# Patient Record
Sex: MALE | Race: ASIAN | NOT HISPANIC OR LATINO | Employment: OTHER | URBAN - METROPOLITAN AREA
[De-identification: names, ages, dates, MRNs, and addresses within clinical notes are randomized per-mention and may not be internally consistent; named-entity substitution may affect disease eponyms.]

---

## 2017-02-02 ENCOUNTER — OFFICE VISIT (OUTPATIENT)
Dept: FAMILY MEDICINE | Facility: CLINIC | Age: 72
End: 2017-02-02
Payer: COMMERCIAL

## 2017-02-02 VITALS
HEART RATE: 76 BPM | HEIGHT: 67 IN | BODY MASS INDEX: 26.34 KG/M2 | TEMPERATURE: 96.7 F | SYSTOLIC BLOOD PRESSURE: 118 MMHG | WEIGHT: 167.8 LBS | DIASTOLIC BLOOD PRESSURE: 70 MMHG

## 2017-02-02 DIAGNOSIS — Z23 NEED FOR PROPHYLACTIC VACCINATION AGAINST STREPTOCOCCUS PNEUMONIAE (PNEUMOCOCCUS): ICD-10-CM

## 2017-02-02 DIAGNOSIS — L30.9 ECZEMA, UNSPECIFIED TYPE: Primary | ICD-10-CM

## 2017-02-02 PROCEDURE — 99213 OFFICE O/P EST LOW 20 MIN: CPT | Performed by: FAMILY MEDICINE

## 2017-02-02 RX ORDER — TRIAMCINOLONE ACETONIDE 1 MG/G
CREAM TOPICAL
Qty: 30 G | Refills: 3 | Status: SHIPPED | OUTPATIENT
Start: 2017-02-02 | End: 2017-12-12

## 2017-02-02 NOTE — PROGRESS NOTES
SUBJECTIVE:                                                    Roselia العراقي is a 72 year old male who presents to clinic today for the following health issues:      Rash     Onset: 2 weeks    Description:   Location: legs and back  Character: round, red  Itching (Pruritis): no     Progression of Symptoms:  same    Accompanying Signs & Symptoms:  Fever: no   Body aches or joint pain: no   Sore throat symptoms: no   Recent cold symptoms: no    History:   Previous similar rash: YES    Precipitating factors:   Exposure to similar rash: no   New exposures: None   Recent travel: no     Alleviating factors:  none     Therapies Tried and outcome: none      Problem list and histories reviewed & adjusted, as indicated.  Additional history: as documented    Patient Active Problem List   Diagnosis     Smoking     Dyspepsia     Varicose veins     Hyperlipidemia LDL goal <130     H. pylori infection     Vertigo, benign positional     Essential hypertension     Hyperlipidemia with target LDL less than 130     Health Care Home     Advanced directives, counseling/discussion     Left knee pain     Benign neoplasm of colon     No past surgical history on file.    Social History   Substance Use Topics     Smoking status: Former Smoker -- 0.30 packs/day for 20 years     Types: Cigarettes     Smokeless tobacco: Never Used     Alcohol Use: No     Family History   Problem Relation Age of Onset     Hypertension Father      Hypertension Brother      CEREBROVASCULAR DISEASE Father          Current Outpatient Prescriptions   Medication Sig Dispense Refill     triamcinolone (KENALOG) 0.1 % cream Apply sparingly to affected area two times daily for 14 days. 30 g 3     propranolol (INDERAL) 10 MG tablet Take 1 tablet (10 mg) by mouth 2 times daily 180 tablet 3     losartan (COZAAR) 50 MG tablet Take 1 tablet (50 mg) by mouth daily 90 tablet 0     UNABLE TO FIND MEDICATION NAME: PreserVision AREDS 2 formula soft gel. 1 soft gel twice daily        "simvastatin (ZOCOR) 20 MG tablet Take 1 tablet (20 mg) by mouth At Bedtime 90 tablet 3     calcium carbonate 500 MG tablet Take 1 tablet by mouth daily. 100 tablet 3     fish oil-omega-3 fatty acids 1000 MG capsule Take 2 capsules by mouth daily. 180 capsule 12     multivitamin, therapeutic with minerals (MULTI-VITAMIN) TABS Take 1 tablet by mouth daily       Allergies   Allergen Reactions     Penicillins      Sulfa Drugs Hives     Sulphasomidine      Tetracycline      Recent Labs   Lab Test  12/20/16   1011  12/15/15   1109  10/01/15   1104  06/25/15   1226   12/18/14   1133   10/03/13   1110   08/09/12   1446   LDL  59  48   --   69   --   65   < >   --    < >   --    HDL  36*  40   --    --    --   41   < >   --    < >   --    TRIG  239*  252*   --    --    --   178*   < >   --    < >   --    ALT  21  37  35  39   --   31   < >  74*   < >  66   CR   --   0.90   --   0.84   < >  0.88   < >  1.05   < >  0.99   GFRESTIMATED   --   83   --   90   < >  86   < >  70   < >  75   GFRESTBLACK   --   >90   GFR Calc     --   >90   GFR Calc     < >  >90   GFR Calc     < >  85   < >  >90   POTASSIUM   --   4.3   --   4.3   < >  4.4   < >  3.6   < >  4.3   TSH   --    --    --    --    --    --    --   2.04   --   1.82    < > = values in this interval not displayed.      BP Readings from Last 3 Encounters:   02/02/17 118/70   12/20/16 126/84   11/01/16 124/92    Wt Readings from Last 3 Encounters:   02/02/17 167 lb 12.8 oz (76.114 kg)   12/20/16 166 lb (75.297 kg)   11/01/16 164 lb (74.39 kg)                    ROS:  Constitutional, HEENT, cardiovascular, pulmonary, gi and gu systems are negative, except as otherwise noted.    OBJECTIVE:                                                    /70 mmHg  Pulse 76  Temp(Src) 96.7  F (35.9  C) (Tympanic)  Ht 5' 7\" (1.702 m)  Wt 167 lb 12.8 oz (76.114 kg)  BMI 26.28 kg/m2  Body mass index is 26.28 kg/(m^2).  GENERAL: healthy, " alert and no distress  NECK: no adenopathy, no asymmetry, masses, or scars and thyroid normal to palpation  RESP: lungs clear to auscultation - no rales, rhonchi or wheezes  CV: regular rate and rhythm, normal S1 S2, no S3 or S4, no murmur, click or rub, no peripheral edema and peripheral pulses strong  ABDOMEN: soft, nontender, no hepatosplenomegaly, no masses and bowel sounds normal  MS: no gross musculoskeletal defects noted, no edema         ASSESSMENT/PLAN:                                                    Roselia was seen today for derm problem.    Diagnoses and all orders for this visit:    Eczema, unspecified type  -     triamcinolone (KENALOG) 0.1 % cream; Apply sparingly to affected area two times daily for 14 days.    Need for prophylactic vaccination against Streptococcus pneumoniae (pneumococcus)      Encouraged moisturizer bid,         David Turcios MD  [facility]

## 2017-02-02 NOTE — NURSING NOTE
"Chief Complaint   Patient presents with     Derm Problem       Initial /70 mmHg  Pulse 76  Temp(Src) 96.7  F (35.9  C) (Tympanic)  Ht 5' 7\" (1.702 m)  Wt 167 lb 12.8 oz (76.114 kg)  BMI 26.28 kg/m2 Estimated body mass index is 26.28 kg/(m^2) as calculated from the following:    Height as of this encounter: 5' 7\" (1.702 m).    Weight as of this encounter: 167 lb 12.8 oz (76.114 kg).  BP completed using cuff size: pancho GARCIA MA Student   "

## 2017-02-02 NOTE — MR AVS SNAPSHOT
"              After Visit Summary   2/2/2017    Roselia العراقي    MRN: [id number]           Patient Information     Date Of Birth          1945        Visit Information        Provider Department      2/2/2017 9:45 AM David Turcios MD; [facility] [employer] [facility]        Today's Diagnoses     Need for prophylactic vaccination against Streptococcus pneumoniae (pneumococcus)    -  1     Eczema, unspecified type            Follow-ups after your visit        Who to contact     If you have questions or need follow up information about today's clinic visit or your schedule please contact [facility] directly at [phone].  Normal or non-critical lab and imaging results will be communicated to you by MyChart, letter or phone within 4 business days after the clinic has received the results. If you do not hear from us within 7 days, please contact the clinic through [employer] or phone. If you have a critical or abnormal lab result, we will notify you by phone as soon as possible.  Submit refill requests through [employer] or call your pharmacy and they will forward the refill request to us. Please allow 3 business days for your refill to be completed.          Additional Information About Your Visit        MyChart Information     [employer] gives you secure access to your electronic health record. If you see a primary care provider, you can also send messages to your care team and make appointments. If you have questions, please call your primary care clinic.  If you do not have a primary care provider, please call [phone] and they will assist you.        Care EveryWhere ID     This is your Care EveryWhere ID. This could be used by other organizations to access your [facility] medical records  [id number]        Your Vitals Were     Pulse Temperature Height BMI (Body Mass Index)          76 96.7  F (35.9  C) (Tympanic) 5' 7\" (1.702 m) 26.28 kg/m2         Blood Pressure from " Last 3 Encounters:   02/02/17 118/70   12/20/16 126/84   11/01/16 124/92    Weight from Last 3 Encounters:   02/02/17 167 lb 12.8 oz (76.114 kg)   12/20/16 166 lb (75.297 kg)   11/01/16 164 lb (74.39 kg)              Today, you had the following     No orders found for display         Today's Medication Changes          These changes are accurate as of: 2/2/17 10:18 AM.  If you have any questions, ask your nurse or doctor.               Start taking these medicines.        Dose/Directions    triamcinolone 0.1 % cream   Commonly known as:  KENALOG   Used for:  Eczema, unspecified type   Started by:  David Turcios MD        Apply sparingly to affected area two times daily for 14 days.   Quantity:  30 g   Refills:  3            Where to get your medicines      These medications were sent to [place] [address] [facility] [address] [facility]  [address] [facility] [address]     Phone:  [phone]    - triamcinolone 0.1 % cream             Primary Care Provider Office Phone # Fax #    David Turcios [phone]       [address] [address]        Thank you!     Thank you for choosing [facility]  for your care. Our goal is always to provide you with excellent care. Hearing back from our patients is one way we can continue to improve our services. Please take a few minutes to complete the written survey that you may receive in the mail after your visit with us. Thank you!             Your Updated Medication List - Protect others around you: Learn how to safely use, store and throw away your medicines at www.disposemymeds.org.          This list is accurate as of: 2/2/17 10:18 AM.  Always use your most recent med list.                   Brand Name Dispense Instructions for use    calcium carbonate 500 MG tablet    OS-[place] 500 mg [place]. Ca    100 tablet    Take 1 tablet by mouth daily.       fish oil-omega-3 fatty  acids 1000 MG capsule     180 capsule    Take 2 capsules by mouth daily.       losartan 50 MG tablet    COZAAR    90 tablet    Take 1 tablet (50 mg) by mouth daily       Multi-vitamin Tabs tablet      Take 1 tablet by mouth daily       propranolol 10 MG tablet    INDERAL    180 tablet    Take 1 tablet (10 mg) by mouth 2 times daily       simvastatin 20 MG tablet    ZOCOR    90 tablet    Take 1 tablet (20 mg) by mouth At Bedtime       triamcinolone 0.1 % cream    KENALOG    30 g    Apply sparingly to affected area two times daily for 14 days.       UNABLE TO FIND      MEDICATION NAME: PreserVision AREDS 2 formula soft gel. 1 soft gel twice daily

## 2017-03-02 ENCOUNTER — EXTERNAL ORDER RESULTS (OUTPATIENT)
Dept: FAMILY MEDICINE | Facility: CLINIC | Age: 72
End: 2017-03-02

## 2017-03-02 DIAGNOSIS — Z53.9 DIAGNOSIS NOT YET DEFINED: Primary | ICD-10-CM

## 2017-03-06 DIAGNOSIS — E78.5 HYPERLIPIDEMIA WITH TARGET LDL LESS THAN 130: Primary | ICD-10-CM

## 2017-03-06 DIAGNOSIS — I10 ESSENTIAL HYPERTENSION: ICD-10-CM

## 2017-03-06 NOTE — TELEPHONE ENCOUNTER
Reason for call: Medication   If this is a refill request, has the caller requested the refill from the pharmacy already? Yes  Will the patient be using a [facility]? No  Name of the pharmacy and phone number for the current request: [facility] [phone]    Name of the medication requested: Simvastatin, 20mg and Losartan, 50mg    Other request: N/A    Phone Number Pt can be reached at: Home number on file [phone] (home)  Best Time: anytime  Can we leave a detailed message on this number? YES

## 2017-03-07 NOTE — TELEPHONE ENCOUNTER
Simvastatin     Last Written Prescription Date: 6/25/15  Last Fill Quantity: 90, # refills: 3  Last Office Visit with [facility], [facility] or  Health prescribing provider: 2/2/17       Lab Results   Component Value Date    CHOL 143 12/20/2016     Lab Results   Component Value Date    HDL 36 12/20/2016     Lab Results   Component Value Date    LDL 59 12/20/2016    LDL 69 06/25/2015     Lab Results   Component Value Date    TRIG 239 12/20/2016     Lab Results   Component Value Date    CHOLHDLRATIO 3.5 12/18/2014     Losartan      Last Written Prescription Date: 11/16/16  Last Fill Quantity: 90, # refills: 0  Last Office Visit with [facility], [facility] or [facility] prescribing provider: 2/2/17       Potassium   Date Value Ref Range Status   12/15/2015 4.3 3.4 - 5.3 mmol/L Final     Creatinine   Date Value Ref Range Status   12/15/2015 0.90 0.66 - 1.25 mg/dL Final     BP Readings from Last 3 Encounters:   02/02/17 118/70   12/20/16 126/84   11/01/16 (!) 124/92     Amanda Dobbins [facility] [address]

## 2017-03-08 RX ORDER — LOSARTAN POTASSIUM 50 MG/1
50 TABLET ORAL DAILY
Qty: 90 TABLET | Refills: 0 | Status: SHIPPED | OUTPATIENT
Start: 2017-03-08 | End: 2017-07-11

## 2017-03-08 RX ORDER — SIMVASTATIN 20 MG
20 TABLET ORAL AT BEDTIME
Qty: 90 TABLET | Refills: 2 | Status: SHIPPED | OUTPATIENT
Start: 2017-03-08 | End: 2017-12-12

## 2017-03-08 NOTE — TELEPHONE ENCOUNTER
Routing refill request to provider for review/approval because:  Labs not current:  For losartan    Brianna EnglandRN  [facility]  [phone]

## 2017-03-16 PROCEDURE — G0179 MD RECERTIFICATION HHA PT: HCPCS | Performed by: FAMILY MEDICINE

## 2017-03-22 ENCOUNTER — CARE COORDINATION (OUTPATIENT)
Dept: GERIATRIC MEDICINE | Facility: CLINIC | Age: 72
End: 2017-03-22

## 2017-03-22 NOTE — PROGRESS NOTES
Received a call from daughter Emilee stating that Betty from [facility] called her about adding homemaking hours. Explained that client was already getting homemaking hours already. Emilee states she is confused and wanted this CM to contact Betty [phone] ext [phone]. Called Gordon and she said that Emilee has been filling out the PCA portion of the time sheet correctly but has not been filling out the homemaking portion and wanted Emilee to start doing this. Called Emilee back and explained this and she was wondering if she needed to fill out homemaking daily or weekly. Conference call with Emilee placed to Gordon and no answer. ML with this question and to call Emilee with the answer. To call this CM if has further questions.   Chanda Pollack RN, PHN, [facility]   [phone]

## 2017-04-13 ENCOUNTER — CARE COORDINATION (OUTPATIENT)
Dept: GERIATRIC MEDICINE | Facility: CLINIC | Age: 72
End: 2017-04-13

## 2017-04-13 NOTE — PROGRESS NOTES
Client and spouse due for PCA assessment by end of month. They go to [facility] on MWF and are flexible with changing the days, except for W/F's. Called daughter Emilee and she was with her father. Discussed seeing client by end of the month and Emilee reports she will be on vacation the last week of April so next week is best. Made an appt to see client and spouse for Monday 4/17/17.Called Jenni Owens for a Mandarin .  Chanda Pollack RN, PHN, [facility]   [phone]

## 2017-04-17 ENCOUNTER — CARE COORDINATION (OUTPATIENT)
Dept: GERIATRIC MEDICINE | Facility: CLINIC | Age: 72
End: 2017-04-17

## 2017-04-17 DIAGNOSIS — Z76.89 HEALTH CARE HOME: ICD-10-CM

## 2017-04-17 DIAGNOSIS — Z71.89 ADVANCED DIRECTIVES, COUNSELING/DISCUSSION: ICD-10-CM

## 2017-04-17 NOTE — PROGRESS NOTES
Home visit/Obed Risk Assessment/EW screening and PCA assessment completed today with client, spouse Vj Brown, daughter Emilee,  Júnior Rader from [employer], co-worker Margaux Curtis, and this CM.  Member resides: Subsidized apt with elevator. Client lives with wife and has a daughter Emilee, that lives in nearby suburb. The spend a lot of time at daughters home when not at [facility]. Client and spouse go to [facility] and then spend Tu/Th and weekends.   Member currently receiving the following services: Adult Day Care from [facility] 3 days a week and PCA at 2.15 hrs daily, and homemaking 3 hrs a week, from [employer]. Daughter is PCA/homemaker.  See EMR for a list of client's diagnoses and medications.  Medication management: Medications reviewed: Med reconciliation done. Medication management: Daughter sets up medication in pill minder. Medication understanding: Patient has understanding of regimen and is adherent Yes. MTM eligible.  Falls: Client with poor vision-macular degeneration and cannot see well. Due to poor vision, client reports fell recently when he could not see the stairs. No major injuries. Client uses a magnifying glass to read and to sign things. Discussed [facility] and called intake and left a message today.  ADL's/IADL's:  Client able to answer the phone, when he hears it, and make calls. Daughter makes medical appts for client and arranges ride. Client reports his wife and daughter do most of the shopping but has gone across the street to [employer] as needed, with wife. Client can make a sandwich or simple meal but due to poor eyesight, makes a mess. Client can do light cleaning like the dishes but gets water everywhere. Daughter does the heavy cleaning and the laundry at daughter's home. Daughter also assists with finances and MA papers. PCA assists client with dressing-with buttons and tightening shoe laces due to poor eyesight. Client can brush own teeth,  "wash face, and reports does not comb hair-just runs his hand through his hair. Client also shaves himself, but due to poor vision, misses part of his facial hair so PCA finishes this task. Client reports he is unable to wash his feet. Discussed client has a bath bench and if he uses it, he could sit down and reach feet. Client reports he feeds himself independently but due to poor vision, needs assist with cutting food. Observed client to transfer a few times during our visit, walk independently in apt in steady gait, as well as reposition self. Client able to get out of bed on own. Client's wife holds onto client when walking in unfamiliar territory, hoa when there are uneven surfaces due to poor vision and falls. Client states he is continent of urine and toilets self independently.   Member Mood/behavior-PHQ 2 score: Client scored 0. Previous CM reports history of feelings of rage and that client may feel like he may want to hit things, but has not acted on these feelings. Hx also of suicidal thoughts and anxiety as well as loneliness and not wanting to bother others. Asked client about this history and how he was feeling now, as well as if he wanted counseling. Client reports when his wife gets angry with him for making a mess, wife will yell at him continually until he gets so upset that all he can see is \"black\". Client will then tell wife to stop because he is seeing black and then wife will stop yelling. Client will then take some time to calm down. Client states going to [facility] has helped a lot with the previous feelings of rage and anger,and that he does not feel the need for counseling by going to [facility].   [facility] benefits: Shared information re: Silver Sneakers/gym memberships, ASA, Calcium +D.  Plan of Care Is: MA transportation as needed, PCA services, ADC, [facility], and case management.   Reviewed Community wide emergency planning: What to do in fire and weather " related emergencies.   Release of Information: signed by client to talk with wife and daughter. Also discussed ACP and short form given to client.   Follow-Up Plan: Member informed of future contact, plan to f/u with member with a 6 month telephone assessment.  Contact information shared with member and family, encouraged member to call with any questions or concerns prior to this.  See [facility] for further detailed information.  Chanda Pollack RN, PHN, CCM  [facility]   [phone]

## 2017-04-18 ENCOUNTER — CARE COORDINATION (OUTPATIENT)
Dept: GERIATRIC MEDICINE | Facility: CLINIC | Age: 72
End: 2017-04-18

## 2017-04-18 NOTE — PROGRESS NOTES
Received a message from Shraddha from [facility]) at [phone] that she was returning this CM call. Called Shraddha back and no answer. ML again.  Chanda Pollack RN, PHN, [facility]   [phone]    4/19/17  Spoke with Wilda who took referral information. They will call daughter to set up the appt. Discussed client wanting a cane and they will evaluate for this as well. Daughter informed that R will be contacting her to set up an appt for a free in home assessment.  Chanda Pollack RN, PHN, [facility]   [phone]    4/21/17  MMIS completed and entered. Client remains a rate cell B. PCA assessment shows client remains eligible for same number of PCA hours daily. Called PRN and informed them of the PCA results and PCA hours remain the same and effective from 5/1/17-4/30/18. POC updated in epic and given to CMS for processing as well as the PCA assessment.   Chanda Pollack RN, PHN, [facility]   [phone]

## 2017-04-21 ENCOUNTER — OFFICE VISIT (OUTPATIENT)
Dept: URGENT CARE | Facility: URGENT CARE | Age: 72
End: 2017-04-21
Payer: COMMERCIAL

## 2017-04-21 ENCOUNTER — CARE COORDINATION (OUTPATIENT)
Dept: GERIATRIC MEDICINE | Facility: CLINIC | Age: 72
End: 2017-04-21

## 2017-04-21 VITALS
SYSTOLIC BLOOD PRESSURE: 120 MMHG | TEMPERATURE: 98.3 F | OXYGEN SATURATION: 93 % | WEIGHT: 168.1 LBS | DIASTOLIC BLOOD PRESSURE: 70 MMHG | HEART RATE: 70 BPM | BODY MASS INDEX: 26.33 KG/M2

## 2017-04-21 DIAGNOSIS — K12.0 ORAL APHTHAE: ICD-10-CM

## 2017-04-21 DIAGNOSIS — K13.79 MOUTH SORE: Primary | ICD-10-CM

## 2017-04-21 PROCEDURE — 99213 OFFICE O/P EST LOW 20 MIN: CPT | Performed by: PHYSICIAN ASSISTANT

## 2017-04-21 RX ORDER — TRIAMCINOLONE ACETONIDE 0.1 %
PASTE (GRAM) DENTAL 2 TIMES DAILY
Qty: 5 G | Refills: 0 | Status: SHIPPED | OUTPATIENT
Start: 2017-04-21 | End: 2019-09-24

## 2017-04-21 NOTE — PROGRESS NOTES
Mailed copy of care plan to client.  As requested/needed:  emailed CPS to Giuliana for auths, updated services in access as needed and submitted appropriate referrals/auths, faxed PCA assessment to MD and [facility] and mailed to patient.  Chart was returned to YASMINE.     Gail Ariza  Case Management Specialist  [facility]  [phone]

## 2017-04-21 NOTE — MR AVS SNAPSHOT
After Visit Summary   4/21/2017    Roselia العراقي    MRN: [id number]           Patient Information     Date Of Birth          1945        Visit Information        Provider Department      4/21/2017 3:45 PM Brayden Perez PA-C [facility]        Today's Diagnoses     Mouth sore    -  1    Oral aphthae          Care Instructions      Canker Sore    A canker sore (also called an aphthous ulcer) is a painful sore on the lining of the mouth. It is most painful during the first few days, and it lasts about 7 to 14 days before going away.  Causes   Canker sores are not cold sores or fever blisters. They are not contagious, so they are not spread by contact. The exact cause of canker sores is not clear, but there are a number of things that can trigger them in different people.    Mild injury, such as biting the inside of the mouth, lip, or cheek, or dental procedures    Stress    Poor diet, or lack of certain nutrients, including B vitamins and iron    Foods that can irritate the mouth, including tomatoes, citrus fruits, and some nuts (foods that are acidic or contain bitter substances called tannins)    Irritating chemicals, such as those in some toothpastes and mouthwashes    Certain chronic illnesses  Symptoms   Canker sores are found on the lining of the mouth. They can be inside the cheeks or lips, on the roof of the mouth, at the base of the gums, on the tongue, or in the back of the throat. Canker sores typically have these characteristics:    Small, flat (not raised) sores    Can be white or yellowish bumps that are red around the edges or have a red halo    Usually small in size, roundish, and in groups    Accompanied by pain or burning   Canker sores do not leave a scar. But they usually come back.  Home care  The goals of canker sore treatment are to decrease the pain, speed healing, and prevent recurrence. No single treatment works for everyone. Try a number of  techniques to see what works best.  General care    You may find that soft, easy-to-chew foods cause less pain. Use a straw to direct liquids away from the sore.    Use a soft-bristle toothbrush, and brush your teeth gently.    Avoid acidic, salty, or spicy foods.    Avoid injuring the inside of your mouth, or scraping your existing canker sores, by avoiding crusty and crunchy foods like french bread and chips.  Medicines  You can try over-the-counter medicines that cover the sores and numb them. This protects the sores while they heal and helps reduce pain.  Homemade rinses and solutions  You can use these solutions as mouth rinses. Spit them out after using them. You can also dab them on the sores. You can repeat these treatments as often as needed.    Rinse your mouth with saltwater.    Mix equal amounts of hydrogen peroxide and water. You can use it as a mouthwash or dab it on spots with a cotton swab. You can also add sodium bicarbonate to this to make a paste, and then dab it on spots.  Follow-up care  Follow up with your healthcare provider, or as advised.    If a culture was done, you will be notified if the treatment needs to be changed. You can call as directed for the results.  Call 911  Contact emergency services if any of these occur:    Trouble breathing    Inability to swallow    Extreme drowsiness or trouble awakening    Fainting or loss of consciousness    Rapid heart rate    Seizure    Stiff neck  When to seek medical advice  Call your healthcare provider right away if any of these occur:    You have a fever of 100.4 F (38 C) or higher.    You are pregnant.    You just had surgery or another medical procedure, or you were just discharged from the hospital.    You are unable to eat or swallow due to pain.    3747-6843 [employer]. [address] [place] [address], [place] [address]. All rights reserved. This information is not intended as a substitute for professional medical care. Always follow  your healthcare professional's instructions.              Follow-ups after your visit        Who to contact     If you have questions or need follow up information about today's clinic visit or your schedule please contact [facility] directly at [phone].  Normal or non-critical lab and imaging results will be communicated to you by MyChart, letter or phone within 4 business days after the clinic has received the results. If you do not hear from us within 7 days, please contact the clinic through [employer] or phone. If you have a critical or abnormal lab result, we will notify you by phone as soon as possible.  Submit refill requests through [employer] or call your pharmacy and they will forward the refill request to us. Please allow 3 business days for your refill to be completed.          Additional Information About Your Visit        MyChart Information     [facility] gives you secure access to your electronic health record. If you see a primary care provider, you can also send messages to your care team and make appointments. If you have questions, please call your primary care clinic.  If you do not have a primary care provider, please call [phone] and they will assist you.        Care EveryWhere ID     This is your Care EveryWhere ID. This could be used by other organizations to access your [facility] medical records  [id number]        Your Vitals Were     Pulse Temperature Pulse Oximetry BMI (Body Mass Index)          70 98.3  F (36.8  C) (Oral) 93% 26.33 kg/m2         Blood Pressure from Last 3 Encounters:   04/21/17 120/70   02/02/17 118/70   12/20/16 126/84    Weight from Last 3 Encounters:   04/21/17 168 lb 1.6 oz (76.2 kg)   02/02/17 167 lb 12.8 oz (76.1 kg)   12/20/16 166 lb (75.3 kg)              Today, you had the following     No orders found for display         Today's Medication Changes          These changes are accurate as of: 4/21/17 11:59 PM.  If you have any  questions, ask your nurse or doctor.               Start taking these medicines.        Dose/Directions    MAGIC MOUTHWASH (ENTER INGREDIENTS IN COMMENTS)   Used for:  Mouth sore, Oral aphthae   Started by:  Brayden Perez PA-C        Dose:  5-10 mL   Swish and spit 5-10 mLs in mouth every 6 hours as needed Pharmacy please compound  30 ml of Benadryl (12.5 mg/5 ml), 60 ml Maalox and 30 ml Viscous Lidocaine   Quantity:  120 mL   Refills:  0       triamcinolone 0.1 % paste   Commonly known as:  KENALOG   Used for:  Mouth sore, Oral aphthae   Started by:  Brayden Perez PA-C        Take by mouth 2 times daily   Quantity:  5 g   Refills:  0            Where to get your medicines      Some of these will need a paper prescription and others can be bought over the counter.  Ask your nurse if you have questions.     Bring a paper prescription for each of these medications     MAGIC MOUTHWASH (ENTER INGREDIENTS IN COMMENTS)    triamcinolone 0.1 % paste                Primary Care Provider Office Phone # Fax #    David Turcios [phone]       [address] [address]        Thank you!     Thank you for choosing [facility]  for your care. Our goal is always to provide you with excellent care. Hearing back from our patients is one way we can continue to improve our services. Please take a few minutes to complete the written survey that you may receive in the mail after your visit with us. Thank you!             Your Updated Medication List - Protect others around you: Learn how to safely use, store and throw away your medicines at www.disposemymeds.org.          This list is accurate as of: 4/21/17 11:59 PM.  Always use your most recent med list.                   Brand Name Dispense Instructions for use    calcium carbonate 500 MG tablet    OS-ALEAXNDRIA 500 mg [place]. Ca    100 tablet    Take 1 tablet by mouth daily Client taking it twice a  day       fish oil-omega-3 fatty acids 1000 MG capsule     180 capsule    Take 2 capsules by mouth daily.       losartan 50 MG tablet    COZAAR    90 tablet    Take 1 tablet (50 mg) by mouth daily       MAGIC MOUTHWASH (ENTER INGREDIENTS IN COMMENTS)     120 mL    Swish and spit 5-10 mLs in mouth every 6 hours as needed Pharmacy please compound  30 ml of Benadryl (12.5 mg/5 ml), 60 ml Maalox and 30 ml Viscous Lidocaine       Multi-vitamin Tabs tablet      Take 1 tablet by mouth daily       propranolol 10 MG tablet    INDERAL    180 tablet    Take 1 tablet (10 mg) by mouth 2 times daily       simvastatin 20 MG tablet    ZOCOR    90 tablet    Take 1 tablet (20 mg) by mouth At Bedtime       triamcinolone 0.1 % cream    KENALOG    30 g    Apply sparingly to affected area two times daily for 14 days.       triamcinolone 0.1 % paste    KENALOG    5 g    Take by mouth 2 times daily       UNABLE TO FIND      MEDICATION NAME: PreserVision AREDS 2 formula soft gel. 1 soft gel twice daily

## 2017-04-21 NOTE — PATIENT INSTRUCTIONS
Canker Sore    A canker sore (also called an aphthous ulcer) is a painful sore on the lining of the mouth. It is most painful during the first few days, and it lasts about 7 to 14 days before going away.  Causes   Canker sores are not cold sores or fever blisters. They are not contagious, so they are not spread by contact. The exact cause of canker sores is not clear, but there are a number of things that can trigger them in different people.    Mild injury, such as biting the inside of the mouth, lip, or cheek, or dental procedures    Stress    Poor diet, or lack of certain nutrients, including B vitamins and iron    Foods that can irritate the mouth, including tomatoes, citrus fruits, and some nuts (foods that are acidic or contain bitter substances called tannins)    Irritating chemicals, such as those in some toothpastes and mouthwashes    Certain chronic illnesses  Symptoms   Canker sores are found on the lining of the mouth. They can be inside the cheeks or lips, on the roof of the mouth, at the base of the gums, on the tongue, or in the back of the throat. Canker sores typically have these characteristics:    Small, flat (not raised) sores    Can be white or yellowish bumps that are red around the edges or have a red halo    Usually small in size, roundish, and in groups    Accompanied by pain or burning   Canker sores do not leave a scar. But they usually come back.  Home care  The goals of canker sore treatment are to decrease the pain, speed healing, and prevent recurrence. No single treatment works for everyone. Try a number of techniques to see what works best.  General care    You may find that soft, easy-to-chew foods cause less pain. Use a straw to direct liquids away from the sore.    Use a soft-bristle toothbrush, and brush your teeth gently.    Avoid acidic, salty, or spicy foods.    Avoid injuring the inside of your mouth, or scraping your existing canker sores, by avoiding crusty and crunchy  foods like french bread and chips.  Medicines  You can try over-the-counter medicines that cover the sores and numb them. This protects the sores while they heal and helps reduce pain.  Homemade rinses and solutions  You can use these solutions as mouth rinses. Spit them out after using them. You can also dab them on the sores. You can repeat these treatments as often as needed.    Rinse your mouth with saltwater.    Mix equal amounts of hydrogen peroxide and water. You can use it as a mouthwash or dab it on spots with a cotton swab. You can also add sodium bicarbonate to this to make a paste, and then dab it on spots.  Follow-up care  Follow up with your healthcare provider, or as advised.    If a culture was done, you will be notified if the treatment needs to be changed. You can call as directed for the results.  Call 911  Contact emergency services if any of these occur:    Trouble breathing    Inability to swallow    Extreme drowsiness or trouble awakening    Fainting or loss of consciousness    Rapid heart rate    Seizure    Stiff neck  When to seek medical advice  Call your healthcare provider right away if any of these occur:    You have a fever of 100.4 F (38 C) or higher.    You are pregnant.    You just had surgery or another medical procedure, or you were just discharged from the hospital.    You are unable to eat or swallow due to pain.    3787-1492 [employer]. [address] [place] [address], [place] [address]. All rights reserved. This information is not intended as a substitute for professional medical care. Always follow your healthcare professional's instructions.

## 2017-04-21 NOTE — NURSING NOTE
"Chief Complaint   Patient presents with     Mouth Lesions     sore mouth, pain, crancrek boty on cheek and toungue 3x days        Initial /70 (BP Location: Left arm, Patient Position: Chair, Cuff Size: Adult Regular)  Pulse 70  Temp 98.3  F (36.8  C) (Oral)  Wt 168 lb 1.6 oz (76.2 kg)  SpO2 93%  BMI 26.33 kg/m2 Estimated body mass index is 26.33 kg/(m^2) as calculated from the following:    Height as of 2/2/17: 5' 7\" (1.702 m).    Weight as of this encounter: 168 lb 1.6 oz (76.2 kg).  Medication Reconciliation: complete    "

## 2017-04-24 NOTE — PROGRESS NOTES
SUBJECTIVE:   Roselia العراقي is a 72 year old male presenting with a chief complaint of canker sores and sore on tongue.  Onset of symptoms was few day(s) ago.  Course of illness is worsening.    Severity moderate  Current and Associated symptoms: sore throat, and sore on cheek  Treatment measures tried include none.  Predisposing factors include none.    Past Medical History:   Diagnosis Date     Gastric ulcer, unspecified as acute or chronic, without mention of hemorrhage or perforation      Gastro-oesophageal reflux disease      High cholesterol      Hyperlipaemia      Hypertension      Smoking      Upper GI bleeding      Vertigo, benign positional         Allergies   Allergen Reactions     Penicillins      Sulfa Drugs Hives     Sulphasomidine      Tetracycline          Social History   Substance Use Topics     Smoking status: Former Smoker     Packs/day: 0.30     Years: 20.00     Types: Cigarettes     Smokeless tobacco: Never Used     Alcohol use No       ROS:  CONSTITUTIONAL:NEGATIVE for fever, chills, change in weight  INTEGUMENTARY/SKIN: POSITIVE for sore on tongue and cheek  ENT/MOUTH: Positive for tongue and cheek soresness  RESP:NEGATIVE for significant cough or SOB  CV: NEGATIVE for chest pain, palpitations or peripheral edema  MUSCULOSKELETAL: NEGATIVE for significant arthralgias or myalgia  NEURO: NEGATIVE for weakness, dizziness or paresthesias    OBJECTIVE  :/70 (BP Location: Left arm, Patient Position: Chair, Cuff Size: Adult Regular)  Pulse 70  Temp 98.3  F (36.8  C) (Oral)  Wt 168 lb 1.6 oz (76.2 kg)  SpO2 93%  BMI 26.33 kg/m2  GENERAL APPEARANCE: healthy, alert and no distress  HENT: TM's normal bilaterally  NECK: supple, nontender, no lymphadenopathy  RESP: lungs clear to auscultation - no rales, rhonchi or wheezes  NEURO: Normal strength and tone, sensory exam grossly normal,  normal speech and mentation  SKIN: Positive for soreness on tongue and cheek    ASSESSMENT/PLN:      ICD-10-CM     1. Mouth sore K13.79 MAGIC MOUTHWASH, ENTER INGREDIENTS IN COMMENTS,     triamcinolone (KENALOG) 0.1 % paste   2. Oral aphthae K12.0 MAGIC MOUTHWASH, ENTER INGREDIENTS IN COMMENTS,     triamcinolone (KENALOG) 0.1 % paste       Follow up with PCP as needed  See orders in Epic

## 2017-05-16 ENCOUNTER — CARE COORDINATION (OUTPATIENT)
Dept: GERIATRIC MEDICINE | Facility: CLINIC | Age: 72
End: 2017-05-16

## 2017-05-16 NOTE — PROGRESS NOTES
Received a call from Nereida Ness from [facility] at [phone] stating that she saw client today. We discussed client with diagnoses of ARMD as well as cataracts. One of the last eye exams showed client with 20/125 and 20/200 vision and Nereida states that is about where she tested him today. No recent eye exam in [facility] from [facility] so called them to get ICD 10 codes as Nereida wants to apply for [employer] for client. ICD 10 codes of H35,3112, H35.3124, H35.3131 and H25.813. Nereida said she was able to give him a 20/20 pen where it writes thicker and bolder without bleeding as well as a signature guide and lighted magnifying glass. She also put client on the waiting list for a CC TV-closed captioning TV. Client was also interested in a cane as he has a hard time with stairs and curbs and tends to trip or fall in these areas.  Nereida said she can get client a white cane and there is a waiting list for white cane training but she will also refer client to [facility] services for the blind to see if they could help out with the training and any other thing client may need.

## 2017-06-13 ENCOUNTER — CARE COORDINATION (OUTPATIENT)
Dept: GERIATRIC MEDICINE | Facility: CLINIC | Age: 72
End: 2017-06-13

## 2017-06-13 NOTE — PROGRESS NOTES
Spoke with daughter Aditi who states client wanted to thank this CM for the referral to [facility] as client was able to get a pair of sunglasses and also a lighter magnifying glass to help with reading. They were not able to get a cane as client needs assist with using stairs and curbs as tends to fall in these areas. Request sent to CMS to please order a cane. Note also sent to PCP.  Chanda Pollack RN, PHN, [facility]   [phone]

## 2017-07-10 NOTE — PROGRESS NOTES
Per [facility], cane was delivered to client on 6/21/17. Checked with daughter Emilee and she states they were on vacation when the cane was delivered and did not think her dad got the cane. She will double check and let this CM know if he got the cane.  Chanda Pollack RN, PHN, [facility]   [phone]

## 2017-07-11 ENCOUNTER — OFFICE VISIT (OUTPATIENT)
Dept: FAMILY MEDICINE | Facility: CLINIC | Age: 72
End: 2017-07-11
Payer: COMMERCIAL

## 2017-07-11 VITALS
DIASTOLIC BLOOD PRESSURE: 83 MMHG | RESPIRATION RATE: 14 BRPM | BODY MASS INDEX: 26.21 KG/M2 | HEIGHT: 67 IN | TEMPERATURE: 98.3 F | OXYGEN SATURATION: 96 % | SYSTOLIC BLOOD PRESSURE: 134 MMHG | WEIGHT: 167 LBS | HEART RATE: 71 BPM

## 2017-07-11 DIAGNOSIS — I10 ESSENTIAL HYPERTENSION: ICD-10-CM

## 2017-07-11 DIAGNOSIS — R20.0 FINGER NUMBNESS: ICD-10-CM

## 2017-07-11 DIAGNOSIS — H54.7 POOR VISION: ICD-10-CM

## 2017-07-11 DIAGNOSIS — H61.20 WAX IN EAR: ICD-10-CM

## 2017-07-11 DIAGNOSIS — Z23 NEED FOR PROPHYLACTIC VACCINATION AGAINST STREPTOCOCCUS PNEUMONIAE (PNEUMOCOCCUS): Primary | ICD-10-CM

## 2017-07-11 DIAGNOSIS — H91.91 HEARING DIFFICULTY, RIGHT: ICD-10-CM

## 2017-07-11 PROCEDURE — 69210 REMOVE IMPACTED EAR WAX UNI: CPT | Performed by: FAMILY MEDICINE

## 2017-07-11 PROCEDURE — 99214 OFFICE O/P EST MOD 30 MIN: CPT | Mod: 25 | Performed by: FAMILY MEDICINE

## 2017-07-11 PROCEDURE — 36415 COLL VENOUS BLD VENIPUNCTURE: CPT | Performed by: FAMILY MEDICINE

## 2017-07-11 PROCEDURE — 80048 BASIC METABOLIC PNL TOTAL CA: CPT | Performed by: FAMILY MEDICINE

## 2017-07-11 PROCEDURE — 84460 ALANINE AMINO (ALT) (SGPT): CPT | Performed by: FAMILY MEDICINE

## 2017-07-11 RX ORDER — LOSARTAN POTASSIUM 50 MG/1
50 TABLET ORAL DAILY
Qty: 90 TABLET | Refills: 1 | Status: SHIPPED | OUTPATIENT
Start: 2017-07-11 | End: 2018-01-11

## 2017-07-11 NOTE — MR AVS SNAPSHOT
After Visit Summary   7/11/2017    Roselia العراقي    MRN: [id number]           Patient Information     Date Of Birth          1945        Visit Information        Provider Department      7/11/2017 9:30 AM David Turcios MD; [facility]        Today's Diagnoses     Need for prophylactic vaccination against Streptococcus pneumoniae (pneumococcus)    -  1    Essential hypertension        Finger numbness        Poor vision        Hearing difficulty, right        Wax in ear           Follow-ups after your visit        Additional Services     OTOLARYNGOLOGY REFERRAL       Your provider has referred you to: N: [facility] [place] [phone]   [email or web address]    Please be aware that coverage of these services is subject to the terms and limitations of your health insurance plan.  Call member services at your health plan with any benefit or coverage questions.      Please bring the following with you to your appointment:    (1) Any X-Rays, CTs or MRIs which have been performed.  Contact the facility where they were done to arrange for  prior to your scheduled appointment.   (2) List of current medications  (3) This referral request   (4) Any documents/labs given to you for this referral                  Who to contact     If you have questions or need follow up information about today's clinic visit or your schedule please contact [facility] [place] directly at [phone].  Normal or non-critical lab and imaging results will be communicated to you by MyChart, letter or phone within 4 business days after the clinic has received the results. If you do not hear from us within 7 days, please contact the clinic through MyChart or phone. If you have a critical or abnormal lab result, we will notify you by phone as soon as possible.  Submit refill requests through [employer] or call your pharmacy and they will  "forward the refill request to us. Please allow 3 business days for your refill to be completed.          Additional Information About Your Visit        [facility] Information     [employer] gives you secure access to your electronic health record. If you see a primary care provider, you can also send messages to your care team and make appointments. If you have questions, please call your primary care clinic.  If you do not have a primary care provider, please call [phone] and they will assist you.        Care EveryWhere ID     This is your Care EveryWhere ID. This could be used by other organizations to access your [place] medical records  [id number]        Your Vitals Were     Pulse Temperature Respirations Height Pulse Oximetry BMI (Body Mass Index)    71 98.3  F (36.8  C) (Tympanic) 14 5' 7\" (1.702 m) 96% 26.16 kg/m2       Blood Pressure from Last 3 Encounters:   07/11/17 134/83   04/21/17 120/70   02/02/17 118/70    Weight from Last 3 Encounters:   07/11/17 167 lb (75.8 kg)   04/21/17 168 lb 1.6 oz (76.2 kg)   02/02/17 167 lb 12.8 oz (76.1 kg)              We Performed the Following     ALT     Basic metabolic panel  (Ca, Cl, CO2, Creat, Gluc, K, Na, BUN)     OTOLARYNGOLOGY REFERRAL     REMOVE IMPACTED CERUMEN          Where to get your medicines      These medications were sent to [place] [address] [place] [facility] [address] [facility] [employer] [facility]  [address] [facility] [address]     Phone:  [phone]     losartan 50 MG tablet          Primary Care Provider Office Phone # Fax #    David Turcios [phone]       [place] [address] [facility] [address]        Equal Access to Services     URIEL DAVIS AH: Hadii alecia credao Soles, waaxda luqadaha, qaybta kaalmada haim, jurgen eduardo. So [facility] [phone].    ATENCIÓN: Si habla español, tiene a stapleton disposición servicios gratuitos de asistencia lingüística. Llame al " [phone].    We comply with applicable federal civil rights laws and [place] laws. We do not discriminate on the basis of race, color, national origin, age, disability sex, sexual orientation or gender identity.            Thank you!     Thank you for choosing [facility] [place]  for your care. Our goal is always to provide you with excellent care. Hearing back from our patients is one way we can continue to improve our services. Please take a few minutes to complete the written survey that you may receive in the mail after your visit with us. Thank you!             Your Updated Medication List - Protect others around you: Learn how to safely use, store and throw away your medicines at www.disposemymeds.org.          This list is accurate as of: 7/11/17 10:20 AM.  Always use your most recent med list.                   Brand Name Dispense Instructions for use Diagnosis    calcium carbonate 1250 MG tablet    OS-ALEX[place]RIA 500 mg [place]. Ca    100 tablet    Take 1 tablet by mouth daily Client taking it twice a day        fish oil-omega-3 fatty acids 1000 MG capsule     180 capsule    Take 2 capsules by mouth daily.        losartan 50 MG tablet    COZAAR    90 tablet    Take 1 tablet (50 mg) by mouth daily    Essential hypertension       Multi-vitamin Tabs tablet      Take 1 tablet by mouth daily        propranolol 10 MG tablet    INDERAL    180 tablet    Take 1 tablet (10 mg) by mouth 2 times daily    Benign essential hypertension       simvastatin 20 MG tablet    ZOCOR    90 tablet    Take 1 tablet (20 mg) by mouth At Bedtime    Hyperlipidemia with target LDL less than 130       triamcinolone 0.1 % cream    KENALOG    30 g    Apply sparingly to affected area two times daily for 14 days.    Eczema, unspecified type       triamcinolone 0.1 % paste    KENALOG    5 g    Take by mouth 2 times daily    Mouth sore, Oral aphthae       UNABLE TO FIND      MEDICATION NAME: PreserVision AREDS 2 formula soft gel. 1 soft  gel twice daily

## 2017-07-11 NOTE — NURSING NOTE
"Chief Complaint   Patient presents with     Eye Problem       Initial /83 (Cuff Size: Adult Regular)  Pulse 71  Temp 98.3  F (36.8  C) (Tympanic)  Resp 14  Ht 5' 7\" (1.702 m)  Wt 167 lb (75.8 kg)  SpO2 96%  BMI 26.16 kg/m2 Estimated body mass index is 26.16 kg/(m^2) as calculated from the following:    Height as of this encounter: 5' 7\" (1.702 m).    Weight as of this encounter: 167 lb (75.8 kg).  Medication Reconciliation: complete   Eliana Burgos, CMA    "

## 2017-07-11 NOTE — PROGRESS NOTES
Received a message back from daughter stating that her dad did get the cane and had forgotten to let daughter know this.  Chanda Pollack RN, PHN, [facility]   [phone]

## 2017-07-11 NOTE — PROGRESS NOTES
SUBJECTIVE:                                                    Roselia العراقي is a 72 year old male who presents to clinic today for the following health issues    Eye Problem      Duration: 15 days     Description (location/character/radiation): eye sight is getting worse. Pt believes that this is caused by BP medication.    Intensity:  moderate    Accompanying signs and symptoms: none          Problem list and histories reviewed & adjusted, as indicated.  Additional history: as documented    Patient Active Problem List   Diagnosis     Smoking     Dyspepsia     Varicose veins     Hyperlipidemia LDL goal <130     H. pylori infection     Vertigo, benign positional     Essential hypertension     Hyperlipidemia with target LDL less than 130     Health Care Home     Advanced directives, counseling/discussion     Left knee pain     Benign neoplasm of colon     No past surgical history on file.    Social History   Substance Use Topics     Smoking status: Former Smoker     Packs/day: 0.30     Years: 20.00     Types: Cigarettes     Smokeless tobacco: Never Used     Alcohol use No     Family History   Problem Relation Age of Onset     Hypertension Father      Hypertension Brother      CEREBROVASCULAR DISEASE Father          Current Outpatient Prescriptions   Medication Sig Dispense Refill     losartan (COZAAR) 50 MG tablet Take 1 tablet (50 mg) by mouth daily 90 tablet 1     simvastatin (ZOCOR) 20 MG tablet Take 1 tablet (20 mg) by mouth At Bedtime 90 tablet 2     propranolol (INDERAL) 10 MG tablet Take 1 tablet (10 mg) by mouth 2 times daily 180 tablet 3     UNABLE TO FIND MEDICATION NAME: PreserVision AREDS 2 formula soft gel. 1 soft gel twice daily       multivitamin, therapeutic with minerals (MULTI-VITAMIN) TABS Take 1 tablet by mouth daily       calcium carbonate 500 MG tablet Take 1 tablet by mouth daily Client taking it twice a day 100 tablet 3     fish oil-omega-3 fatty acids 1000 MG capsule Take 2 capsules by mouth  daily. 180 capsule 12     triamcinolone (KENALOG) 0.1 % paste Take by mouth 2 times daily (Patient not taking: Reported on 7/11/2017) 5 g 0     [DISCONTINUED] losartan (COZAAR) 50 MG tablet Take 1 tablet (50 mg) by mouth daily (Patient not taking: Reported on 7/11/2017) 90 tablet 0     triamcinolone (KENALOG) 0.1 % cream Apply sparingly to affected area two times daily for 14 days. (Patient not taking: Reported on 7/11/2017) 30 g 3     Allergies   Allergen Reactions     Penicillins      Sulfa Drugs Hives     Sulphasomidine      Tetracycline      Recent Labs   Lab Test  12/20/16   1011  12/15/15   1109  10/01/15   1104  06/25/15   1226   12/18/14   1133   10/03/13   1110   08/09/12   1446   LDL  59  48   --   69   --   65   < >   --    < >   --    HDL  36*  40   --    --    --   41   < >   --    < >   --    TRIG  239*  252*   --    --    --   178*   < >   --    < >   --    ALT  21  37  35  39   --   31   < >  74*   < >  66   CR   --   0.90   --   0.84   < >  0.88   < >  1.05   < >  0.99   GFRESTIMATED   --   83   --   90   < >  86   < >  70   < >  75   GFRESTBLACK   --   >90   GFR Calc     --   >90   GFR Calc     < >  >90   GFR Calc     < >  85   < >  >90   POTASSIUM   --   4.3   --   4.3   < >  4.4   < >  3.6   < >  4.3   TSH   --    --    --    --    --    --    --   2.04   --   1.82    < > = values in this interval not displayed.      BP Readings from Last 3 Encounters:   07/11/17 134/83   04/21/17 120/70   02/02/17 118/70    Wt Readings from Last 3 Encounters:   07/11/17 167 lb (75.8 kg)   04/21/17 168 lb 1.6 oz (76.2 kg)   02/02/17 167 lb 12.8 oz (76.1 kg)                    Reviewed and updated as needed this visit by clinical staff       Reviewed and updated as needed this visit by Provider         ROS:  Constitutional, HEENT, cardiovascular, pulmonary, gi and gu systems are negative, except as otherwise noted.    OBJECTIVE:     /83 (Cuff Size: Adult  "Regular)  Pulse 71  Temp 98.3  F (36.8  C) (Tympanic)  Resp 14  Ht 5' 7\" (1.702 m)  Wt 167 lb (75.8 kg)  SpO2 96%  BMI 26.16 kg/m2  Body mass index is 26.16 kg/(m^2).  GENERAL: healthy, alert and no distress  NECK: no adenopathy, no asymmetry, masses, or scars and thyroid normal to palpation  RESP: lungs clear to auscultation - no rales, rhonchi or wheezes  CV: regular rate and rhythm, normal S1 S2, no S3 or S4, no murmur, click or rub, no peripheral edema and peripheral pulses strong  ABDOMEN: soft, nontender, no hepatosplenomegaly, no masses and bowel sounds normal  MS: no gross musculoskeletal defects noted, no edema        ASSESSMENT/PLAN:   ASSESSMENT / PLAN:  (Z23) Need for prophylactic vaccination against Streptococcus pneumoniae (pneumococcus)  (primary encounter diagnosis)      (I10) Essential hypertension  Comment: has been stable with current dose of meds, has no sign of poor vision related with medicine, encouraged him to resume taking losartan for keeping BP under control   Plan: Basic metabolic panel  (Ca, Cl, CO2, Creat,         Gluc, K, Na, BUN), ALT, losartan (COZAAR) 50 MG        tablet            (R20.0) Finger numbness  Comment: only in the morning, waking up with sx  Plan: encouraged him to try asa and working on hydration     (H54.7) Poor vision  Plan: encouraged him to see eye clinic as scheduled      (H91.91) Hearing difficulty, right  Comment: on right, wax removed but still not able to hear well, will refer him to ENT  Plan: OTOLARYNGOLOGY REFERRAL            (H61.20) Wax in ear  Comment: removed ENT forceps without complication and flushed with water   Plan: REMOVE IMPACTED CERUMEN                FUTURE APPOINTMENTS:       - Follow-up visit in 6 months     David Turcios MD  [facility]    "

## 2017-07-12 LAB
ALT SERPL W P-5'-P-CCNC: 30 U/L (ref 0–70)
ANION GAP SERPL CALCULATED.3IONS-SCNC: 11 MMOL/L (ref 3–14)
BUN SERPL-MCNC: 16 MG/DL (ref 7–30)
CALCIUM SERPL-MCNC: 9 MG/DL (ref 8.5–10.1)
CHLORIDE SERPL-SCNC: 103 MMOL/L (ref 94–109)
CO2 SERPL-SCNC: 25 MMOL/L (ref 20–32)
CREAT SERPL-MCNC: 0.88 MG/DL (ref 0.66–1.25)
GFR SERPL CREATININE-BSD FRML MDRD: 85 ML/MIN/1.7M2
GLUCOSE SERPL-MCNC: 96 MG/DL (ref 70–99)
POTASSIUM SERPL-SCNC: 4.7 MMOL/L (ref 3.4–5.3)
SODIUM SERPL-SCNC: 139 MMOL/L (ref 133–144)

## 2017-07-18 ENCOUNTER — TRANSFERRED RECORDS (OUTPATIENT)
Dept: HEALTH INFORMATION MANAGEMENT | Facility: CLINIC | Age: 72
End: 2017-07-18

## 2017-10-19 ENCOUNTER — CARE COORDINATION (OUTPATIENT)
Dept: GERIATRIC MEDICINE | Facility: CLINIC | Age: 72
End: 2017-10-19

## 2017-10-19 NOTE — PROGRESS NOTES
[facility] Six-Month Telephone Assessment    6 month telephone assessment completed today when daughter called this CM back.    ER visits: 0  Hospitalizations: 0  TCU stays: 0  Significant health status changes: none  Falls/Injuries: none  ADL/IADL changes: none  Caregiver assessment follow up: na    Changes in services:   No new service needs identified or requested at this time.     Goals: See POC in chart for goal progress documentation.    Will see client in 6 months for an annual health risk assessment.   Encouraged client to call CM with any questions or concerns in the meantime.   Chanda Pollack RN, PHN, [facility]   [phone]

## 2017-10-19 NOTE — PROGRESS NOTES
Client due soon for a 6 month telephone assessment. Called and no answer. ML requesting a call back.  Chanda Pollack RN, PHN, [facility]   [phone]

## 2017-11-29 ENCOUNTER — TELEPHONE (OUTPATIENT)
Dept: FAMILY MEDICINE | Facility: CLINIC | Age: 72
End: 2017-11-29

## 2017-11-30 NOTE — TELEPHONE ENCOUNTER
Forms faxed to [facility] [phone]  Copy sent to stat abstracting  Lavinia Benavidez TC

## 2017-12-12 ENCOUNTER — OFFICE VISIT (OUTPATIENT)
Dept: FAMILY MEDICINE | Facility: CLINIC | Age: 72
End: 2017-12-12
Payer: COMMERCIAL

## 2017-12-12 VITALS
TEMPERATURE: 97.4 F | RESPIRATION RATE: 14 BRPM | HEIGHT: 67 IN | DIASTOLIC BLOOD PRESSURE: 76 MMHG | WEIGHT: 159 LBS | OXYGEN SATURATION: 96 % | SYSTOLIC BLOOD PRESSURE: 122 MMHG | HEART RATE: 62 BPM | BODY MASS INDEX: 24.96 KG/M2

## 2017-12-12 DIAGNOSIS — E78.5 HYPERLIPIDEMIA WITH TARGET LDL LESS THAN 130: ICD-10-CM

## 2017-12-12 DIAGNOSIS — Z23 NEED FOR PROPHYLACTIC VACCINATION AGAINST STREPTOCOCCUS PNEUMONIAE (PNEUMOCOCCUS): ICD-10-CM

## 2017-12-12 DIAGNOSIS — Z91.81 AT RISK FOR FALLING: ICD-10-CM

## 2017-12-12 DIAGNOSIS — Z23 NEED FOR PROPHYLACTIC VACCINATION AND INOCULATION AGAINST INFLUENZA: ICD-10-CM

## 2017-12-12 DIAGNOSIS — L30.9 ECZEMA, UNSPECIFIED TYPE: ICD-10-CM

## 2017-12-12 DIAGNOSIS — J01.90 ACUTE SINUSITIS WITH SYMPTOMS > 10 DAYS: Primary | ICD-10-CM

## 2017-12-12 PROCEDURE — 99214 OFFICE O/P EST MOD 30 MIN: CPT | Performed by: FAMILY MEDICINE

## 2017-12-12 RX ORDER — SIMVASTATIN 20 MG
20 TABLET ORAL AT BEDTIME
Qty: 90 TABLET | Refills: 3 | Status: SHIPPED | OUTPATIENT
Start: 2017-12-12 | End: 2018-12-14

## 2017-12-12 RX ORDER — TRIAMCINOLONE ACETONIDE 1 MG/G
CREAM TOPICAL
Qty: 80 G | Refills: 5 | Status: SHIPPED | OUTPATIENT
Start: 2017-12-12 | End: 2019-09-24

## 2017-12-12 RX ORDER — AZITHROMYCIN 250 MG/1
TABLET, FILM COATED ORAL
Qty: 6 TABLET | Refills: 0 | Status: SHIPPED | OUTPATIENT
Start: 2017-12-12 | End: 2018-01-16

## 2017-12-12 NOTE — NURSING NOTE
"Chief Complaint   Patient presents with     URI       Initial /76 (Cuff Size: Adult Regular)  Pulse 62  Temp 97.4  F (36.3  C) (Tympanic)  Resp 14  Ht 5' 7\" (1.702 m)  Wt 159 lb (72.1 kg)  SpO2 96%  BMI 24.9 kg/m2 Estimated body mass index is 24.9 kg/(m^2) as calculated from the following:    Height as of this encounter: 5' 7\" (1.702 m).    Weight as of this encounter: 159 lb (72.1 kg).  Medication Reconciliation: complete   Eliana Burgos, CMA    "

## 2017-12-12 NOTE — MR AVS SNAPSHOT
After Visit Summary   12/12/2017    Roselia العراقي    MRN: [id number]           Patient Information     Date Of Birth          1945        Visit Information        Provider Department      12/12/2017 11:30 AM David Turcios MD; [place] [employer] [facility] [place]        Today's Diagnoses     Acute sinusitis with symptoms > 10 days    -  1    At risk for falling        Need for prophylactic vaccination and inoculation against influenza        Need for prophylactic vaccination against Streptococcus pneumoniae (pneumococcus)        Eczema, unspecified type        Hyperlipidemia with target LDL less than 130           Follow-ups after your visit        Follow-up notes from your care team     Return for Physical Exam.      Who to contact     If you have questions or need follow up information about today's clinic visit or your schedule please contact [facility] [place] directly at [phone].  Normal or non-critical lab and imaging results will be communicated to you by MyChart, letter or phone within 4 business days after the clinic has received the results. If you do not hear from us within 7 days, please contact the clinic through MyChart or phone. If you have a critical or abnormal lab result, we will notify you by phone as soon as possible.  Submit refill requests through [employer] or call your pharmacy and they will forward the refill request to us. Please allow 3 business days for your refill to be completed.          Additional Information About Your Visit        MyChart Information     [employer] gives you secure access to your electronic health record. If you see a primary care provider, you can also send messages to your care team and make appointments. If you have questions, please call your primary care clinic.  If you do not have a primary care provider, please call [phone] and they will assist you.        Care EveryWhere ID     This is your Care EveryWhere  "ID. This could be used by other organizations to access your [facility] medical records  [id number]        Your Vitals Were     Pulse Temperature Respirations Height Pulse Oximetry BMI (Body Mass Index)    62 97.4  F (36.3  C) (Tympanic) 14 5' 7\" (1.702 m) 96% 24.9 kg/m2       Blood Pressure from Last 3 Encounters:   12/12/17 122/76   07/11/17 134/83   04/21/17 120/70    Weight from Last 3 Encounters:   12/12/17 159 lb (72.1 kg)   07/11/17 167 lb (75.8 kg)   04/21/17 168 lb 1.6 oz (76.2 kg)              Today, you had the following     No orders found for display         Today's Medication Changes          These changes are accurate as of: 12/12/17 12:01 PM.  If you have any questions, ask your nurse or doctor.               Start taking these medicines.        Dose/Directions    azithromycin 250 MG tablet   Commonly known as:  ZITHROMAX   Used for:  Acute sinusitis with symptoms > 10 days        Two tablets first day, then one tablet daily for four days.   Quantity:  6 tablet   Refills:  0            Where to get your medicines      These medications were sent to [facility] [place], [place] - [address] [place] [address], [facility] [address]     Phone:  [phone]     azithromycin 250 MG tablet    simvastatin 20 MG tablet    triamcinolone 0.1 % cream                Primary Care Provider Office Phone # Fax #    David Turcios [phone]       [address] [facility] [address]        Equal Access to Services     [facility]: Hadii aad ku hadashmarvin Soles, waaxda luqadaha, qaybta kaalmada haim, jurgen eduardo. So [facility] [place] [phone].    ATENCIÓN: Si habla español, tiene a stapleton disposición servicios gratuitos de asistencia lingüística. Llame al [phone].    We comply with applicable federal civil rights laws and [place] laws. We do not discriminate on the basis of race, color, national origin, age, " disability, sex, sexual orientation, or gender identity.            Thank you!     Thank you for choosing [facility] [place]  for your care. Our goal is always to provide you with excellent care. Hearing back from our patients is one way we can continue to improve our services. Please take a few minutes to complete the written survey that you may receive in the mail after your visit with us. Thank you!             Your Updated Medication List - Protect others around you: Learn how to safely use, store and throw away your medicines at www.disposemymeds.org.          This list is accurate as of: 12/12/17 12:01 PM.  Always use your most recent med list.                   Brand Name Dispense Instructions for use Diagnosis    azithromycin 250 MG tablet    ZITHROMAX    6 tablet    Two tablets first day, then one tablet daily for four days.    Acute sinusitis with symptoms > 10 days       calcium carbonate 1250 MG tablet    OS-[place] 500 mg [place]    100 tablet    Take 1 tablet by mouth daily Client taking it twice a day        fish oil-omega-3 fatty acids 1000 MG capsule     180 capsule    Take 2 capsules by mouth daily.        losartan 50 MG tablet    COZAAR    90 tablet    Take 1 tablet (50 mg) by mouth daily    Essential hypertension       Multi-vitamin Tabs tablet      Take 1 tablet by mouth daily        propranolol 10 MG tablet    INDERAL    180 tablet    Take 1 tablet (10 mg) by mouth 2 times daily    Benign essential hypertension       simvastatin 20 MG tablet    ZOCOR    90 tablet    Take 1 tablet (20 mg) by mouth At Bedtime    Hyperlipidemia with target LDL less than 130       triamcinolone 0.1 % cream    KENALOG    80 g    Apply sparingly to affected area two times daily for 14 days.    Eczema, unspecified type       triamcinolone 0.1 % paste    KENALOG    5 g    Take by mouth 2 times daily    Mouth sore, Oral aphthae       UNABLE TO FIND      MEDICATION NAME: PreserVision AREDS 2 formula soft gel. 1  soft gel twice daily

## 2017-12-12 NOTE — PROGRESS NOTES
SUBJECTIVE:   Roselia العراقي is a 72 year old male who presents to clinic today for the following health issues:      RESPIRATORY SYMPTOMS      Duration: 3 days     Description  cough    Severity: moderate    Accompanying signs and symptoms: None    History (predisposing factors):  none    Precipitating or alleviating factors: None    Therapies tried and outcome:  none    Problem list and histories reviewed & adjusted, as indicated.  Additional history: as documented    Patient Active Problem List   Diagnosis     Smoking     Dyspepsia     Varicose veins     Hyperlipidemia LDL goal <130     H. pylori infection     Vertigo, benign positional     Essential hypertension     Hyperlipidemia with target LDL less than 130     Health Care Home     Advanced directives, counseling/discussion     Left knee pain     Benign neoplasm of colon     No past surgical history on file.    Social History   Substance Use Topics     Smoking status: Former Smoker     Packs/day: 0.30     Years: 20.00     Types: Cigarettes     Smokeless tobacco: Never Used     Alcohol use No     Family History   Problem Relation Age of Onset     Hypertension Father      Hypertension Brother      CEREBROVASCULAR DISEASE Father          Current Outpatient Prescriptions   Medication Sig Dispense Refill     triamcinolone (KENALOG) 0.1 % cream Apply sparingly to affected area two times daily for 14 days. 80 g 5     azithromycin (ZITHROMAX) 250 MG tablet Two tablets first day, then one tablet daily for four days. 6 tablet 0     simvastatin (ZOCOR) 20 MG tablet Take 1 tablet (20 mg) by mouth At Bedtime 90 tablet 3     losartan (COZAAR) 50 MG tablet Take 1 tablet (50 mg) by mouth daily 90 tablet 1     propranolol (INDERAL) 10 MG tablet Take 1 tablet (10 mg) by mouth 2 times daily 180 tablet 3     UNABLE TO FIND MEDICATION NAME: PreserVision AREDS 2 formula soft gel. 1 soft gel twice daily       triamcinolone (KENALOG) 0.1 % paste Take by mouth 2 times daily (Patient  not taking: Reported on 7/11/2017) 5 g 0     [DISCONTINUED] simvastatin (ZOCOR) 20 MG tablet Take 1 tablet (20 mg) by mouth At Bedtime 90 tablet 2     multivitamin, therapeutic with minerals (MULTI-VITAMIN) TABS Take 1 tablet by mouth daily       calcium carbonate 500 MG tablet Take 1 tablet by mouth daily Client taking it twice a day 100 tablet 3     fish oil-omega-3 fatty acids 1000 MG capsule Take 2 capsules by mouth daily. 180 capsule 12     Allergies   Allergen Reactions     Penicillins      Sulfa Drugs Hives     Sulphasomidine      Tetracycline      Recent Labs   Lab Test  07/11/17   1021  12/20/16   1011  12/15/15   1109   06/25/15   1226   12/18/14   1133   10/03/13   1110   08/09/12   1446   LDL   --   59  48   --   69   --   65   < >   --    < >   --    HDL   --   36*  40   --    --    --   41   < >   --    < >   --    TRIG   --   239*  252*   --    --    --   178*   < >   --    < >   --    ALT  30  21  37   < >  39   --   31   < >  74*   < >  66   CR  0.88   --   0.90   --   0.84   < >  0.88   < >  1.05   < >  0.99   GFRESTIMATED  85   --   83   --   90   < >  86   < >  70   < >  75   GFRESTBLACK  >90   GFR Calc     --   >90   GFR Calc     --   >90   GFR Calc     < >  >90   GFR Calc     < >  85   < >  >90   POTASSIUM  4.7   --   4.3   --   4.3   < >  4.4   < >  3.6   < >  4.3   TSH   --    --    --    --    --    --    --    --   2.04   --   1.82    < > = values in this interval not displayed.      BP Readings from Last 3 Encounters:   12/12/17 122/76   07/11/17 134/83   04/21/17 120/70    Wt Readings from Last 3 Encounters:   12/12/17 159 lb (72.1 kg)   07/11/17 167 lb (75.8 kg)   04/21/17 168 lb 1.6 oz (76.2 kg)           Reviewed and updated as needed this visit by clinical staff     Reviewed and updated as needed this visit by Provider         ROS:  Constitutional, HEENT, cardiovascular, pulmonary, gi and gu systems are negative, except  "as otherwise noted.      OBJECTIVE:   /76 (Cuff Size: Adult Regular)  Pulse 62  Temp 97.4  F (36.3  C) (Tympanic)  Resp 14  Ht 5' 7\" (1.702 m)  Wt 159 lb (72.1 kg)  SpO2 96%  BMI 24.9 kg/m2  Body mass index is 24.9 kg/(m^2).  GENERAL: healthy, alert and no distress  NECK: no adenopathy, no asymmetry, masses, or scars and thyroid normal to palpation  RESP: lungs clear to auscultation - no rales, rhonchi or wheezes  CV: regular rate and rhythm, normal S1 S2, no S3 or S4, no murmur, click or rub, no peripheral edema and peripheral pulses strong  ABDOMEN: soft, nontender, no hepatosplenomegaly, no masses and bowel sounds normal  MS: no gross musculoskeletal defects noted, no edema        ASSESSMENT/PLAN:   ASSESSMENT / PLAN:  (J01.90) Acute sinusitis with symptoms > 10 days  (primary encounter diagnosis)  Comment: wheezing throughout the lung, has no mild SOB with cough for last 2 weeks, will have him to start abx  Plan: azithromycin (ZITHROMAX) 250 MG tablet            (Z91.81) At risk for falling      (Z23) Need for prophylactic vaccination and inoculation against influenza      (Z23) Need for prophylactic vaccination against Streptococcus pneumoniae (pneumococcus)      (L30.9) Eczema, unspecified type  Comment: on bilateral LE, has postinflamatory hyperpigmentation with macular rashes, will have him to try moisturizer bid and kenalog cream prn   Plan: triamcinolone (KENALOG) 0.1 % cream            (E78.5) Hyperlipidemia with target LDL less than 130  Comment: has been stable with current dose of meds, will keep him taking it and will recheck lab in 1 month with CPE  Plan: simvastatin (ZOCOR) 20 MG tablet                FUTURE APPOINTMENTS:       - Follow-up visit in 1 month for CPE    David Turcios MD  [facility] [place]  "

## 2017-12-20 PROCEDURE — G0179 MD RECERTIFICATION HHA PT: HCPCS | Performed by: FAMILY MEDICINE

## 2018-01-11 DIAGNOSIS — I10 ESSENTIAL HYPERTENSION: ICD-10-CM

## 2018-01-11 RX ORDER — LOSARTAN POTASSIUM 50 MG/1
TABLET ORAL
Qty: 90 TABLET | Refills: 1 | Status: SHIPPED | OUTPATIENT
Start: 2018-01-11 | End: 2018-04-17

## 2018-01-11 NOTE — TELEPHONE ENCOUNTER
Name of caller: Kaycee  Relationship of Patient: RN adult     Reason for Call: Patient is completely out of his RX and they would like for us to fill ASAP. Informed of 72 hour window    Best phone number to reach pt at is: [phone]  Ok to leave a message with medical info? Yes    Pharmacy preferred (if calling for a refill): [facility]    Chelsea Haage  [facility] [employer] [facility]-Patient Representative

## 2018-01-11 NOTE — TELEPHONE ENCOUNTER
"  Refill approved through [facility] protocol.  Brianna England,RN  [facility]  [phone]    Requested Prescriptions   Pending Prescriptions Disp Refills     losartan (COZAAR) 50 MG tablet [Pharmacy Med Name: LOSARTAN POTASSIUM 50 MG TAB] 90 tablet 1     Sig: TAKE 1 TABLET (50 MG) BY MOUTH DAILY    Angiotensin-II Receptors Passed    1/11/2018 10:42 AM       Passed - Blood pressure under 140/90 in past 12 months.    BP Readings from Last 3 Encounters:   12/12/17 122/76   07/11/17 134/83   04/21/17 120/70                Passed - Recent or future visit with authorizing provider's specialty    Patient had office visit in the last year or has a visit in the next 30 days with authorizing provider.  See \"Patient Info\" tab in inbasket, or \"Choose Columns\" in Meds & Orders section of the refill encounter.              Passed - Patient is age 18 or older       Passed - Normal serum creatinine on file in past 12 months    Recent Labs   Lab Test  07/11/17   1021   CR  0.88            Passed - Normal serum potassium on file in past 12 months    Recent Labs   Lab Test  07/11/17   1021   POTASSIUM  4.7                      "

## 2018-01-16 ENCOUNTER — OFFICE VISIT (OUTPATIENT)
Dept: FAMILY MEDICINE | Facility: CLINIC | Age: 73
End: 2018-01-16
Payer: COMMERCIAL

## 2018-01-16 VITALS
SYSTOLIC BLOOD PRESSURE: 134 MMHG | TEMPERATURE: 97.5 F | WEIGHT: 162 LBS | OXYGEN SATURATION: 98 % | HEART RATE: 65 BPM | DIASTOLIC BLOOD PRESSURE: 85 MMHG | BODY MASS INDEX: 25.43 KG/M2 | HEIGHT: 67 IN | RESPIRATION RATE: 16 BRPM

## 2018-01-16 DIAGNOSIS — Z91.81 AT RISK FOR FALLING: ICD-10-CM

## 2018-01-16 DIAGNOSIS — Z23 NEED FOR PROPHYLACTIC VACCINATION AGAINST STREPTOCOCCUS PNEUMONIAE (PNEUMOCOCCUS): ICD-10-CM

## 2018-01-16 DIAGNOSIS — Z00.00 HEALTH CARE MAINTENANCE: Primary | ICD-10-CM

## 2018-01-16 DIAGNOSIS — Z12.5 SCREENING FOR PROSTATE CANCER: ICD-10-CM

## 2018-01-16 DIAGNOSIS — Z23 NEED FOR VACCINATION: ICD-10-CM

## 2018-01-16 DIAGNOSIS — A04.8 H. PYLORI INFECTION: ICD-10-CM

## 2018-01-16 LAB
ALT SERPL W P-5'-P-CCNC: 23 U/L (ref 0–70)
ANION GAP SERPL CALCULATED.3IONS-SCNC: 8 MMOL/L (ref 3–14)
BUN SERPL-MCNC: 14 MG/DL (ref 7–30)
CALCIUM SERPL-MCNC: 8.9 MG/DL (ref 8.5–10.1)
CHLORIDE SERPL-SCNC: 101 MMOL/L (ref 94–109)
CHOLEST SERPL-MCNC: 133 MG/DL
CO2 SERPL-SCNC: 28 MMOL/L (ref 20–32)
CREAT SERPL-MCNC: 0.81 MG/DL (ref 0.66–1.25)
GFR SERPL CREATININE-BSD FRML MDRD: >90 ML/MIN/1.7M2
GLUCOSE SERPL-MCNC: 91 MG/DL (ref 70–99)
HDLC SERPL-MCNC: 44 MG/DL
HGB BLD-MCNC: 16.3 G/DL (ref 13.3–17.7)
LDLC SERPL CALC-MCNC: 45 MG/DL
NONHDLC SERPL-MCNC: 89 MG/DL
POTASSIUM SERPL-SCNC: 4.3 MMOL/L (ref 3.4–5.3)
SODIUM SERPL-SCNC: 137 MMOL/L (ref 133–144)
TRIGL SERPL-MCNC: 221 MG/DL

## 2018-01-16 PROCEDURE — 80061 LIPID PANEL: CPT | Performed by: FAMILY MEDICINE

## 2018-01-16 PROCEDURE — G0438 PPPS, INITIAL VISIT: HCPCS | Performed by: FAMILY MEDICINE

## 2018-01-16 PROCEDURE — 36415 COLL VENOUS BLD VENIPUNCTURE: CPT | Performed by: FAMILY MEDICINE

## 2018-01-16 PROCEDURE — 99212 OFFICE O/P EST SF 10 MIN: CPT | Mod: 25 | Performed by: FAMILY MEDICINE

## 2018-01-16 PROCEDURE — G0103 PSA SCREENING: HCPCS | Performed by: FAMILY MEDICINE

## 2018-01-16 PROCEDURE — G0009 ADMIN PNEUMOCOCCAL VACCINE: HCPCS | Performed by: FAMILY MEDICINE

## 2018-01-16 PROCEDURE — 80048 BASIC METABOLIC PNL TOTAL CA: CPT | Performed by: FAMILY MEDICINE

## 2018-01-16 PROCEDURE — 85018 HEMOGLOBIN: CPT | Performed by: FAMILY MEDICINE

## 2018-01-16 PROCEDURE — 84460 ALANINE AMINO (ALT) (SGPT): CPT | Performed by: FAMILY MEDICINE

## 2018-01-16 PROCEDURE — 90670 PCV13 VACCINE IM: CPT | Performed by: FAMILY MEDICINE

## 2018-01-16 NOTE — PROGRESS NOTES
SUBJECTIVE:   Roselia العراقي is a 73 year old male who presents for Preventive Visit.      Are you in the first 12 months of your Medicare Part B coverage?  No    Healthy Habits:    Do you get at least three servings of calcium containing foods daily (dairy, green leafy vegetables, etc.)? yes    Amount of exercise or daily activities, outside of work: 2 day(s) per week    Problems taking medications regularly No    Medication side effects: No    Have you had an eye exam in the past two years? yes    Do you see a dentist twice per year? yes    Do you have sleep apnea, excessive snoring or daytime drowsiness?no      Ability to successfully perform activities of daily living: Yes, no assistance needed    Home safety:  none identified     Hearing impairment: Yes, Difficulty following a conversation in a noisy restaurant or crowded room.    Fall risk:  Fallen 2 or more times in the past year?: No  Any fall with injury in the past year?: No          COGNITIVE SCREEN  1) Repeat 3 items (Banana, Sunrise, Chair)    2) Clock draw: NORMAL  3) 3 item recall: Recalls 3 objects  Results: 3 items recalled: COGNITIVE IMPAIRMENT LESS LIKELY    Mini-CogTM Copyright S Dk. Licensed by the author for use in [facility]; reprinted with permission (soob@[facility]). All rights reserved.        Reviewed and updated as needed this visit by clinical staff         Reviewed and updated as needed this visit by Provider      Social History   Substance Use Topics     Smoking status: Former Smoker     Packs/day: 0.30     Years: 20.00     Types: Cigarettes     Smokeless tobacco: Never Used     Alcohol use No       If you drink alcohol do you typically have >3 drinks per day or >7 drinks per week? Yes - AUDIT SCORE:     No flowsheet data found.                            Today's PHQ-2 Score: PHQ-2 ( 1999 Pfizer) 12/12/2017 4/17/2017   Q1: Little interest or pleasure in doing things 0 0   Q2: Feeling down, depressed or hopeless 0 0    PHQ-2 Score 0 0         Do you feel safe in your environment - Yes    Do you have a Health Care Directive?: No: Advance care planning was reviewed with patient; patient declined at this time.      Current providers sharing in care for this patient include: Patient Care Team:  David Turcios MD as PCP - General (Family Practice)  Chanda Pollack, STEWART as   Chloe Aparicio as Other (see comments)  Kailey Simeon    The following health maintenance items are reviewed in Epic and correct as of today:  Health Maintenance   Topic Date Due     AORTIC ANEURYSM SCREENING (SYSTEM ASSIGNED)  01/09/2009     PNEUMOCOCCAL (2 of 2 - PCV13) 04/18/2010     FALL RISK ASSESSMENT  12/20/2017     TETANUS IMMUNIZATION (SYSTEM ASSIGNED)  01/01/2019     COLONOSCOPY Q5 YR  11/01/2021     LIPID SCREEN Q5 YR MALE (SYSTEM ASSIGNED)  12/20/2021     ADVANCE DIRECTIVE PLANNING Q5 YRS  04/17/2022     INFLUENZA VACCINE (SYSTEM ASSIGNED)  Completed     BP Readings from Last 3 Encounters:   01/16/18 154/85   12/12/17 122/76   07/11/17 134/83    Wt Readings from Last 3 Encounters:   01/16/18 162 lb (73.5 kg)   12/12/17 159 lb (72.1 kg)   07/11/17 167 lb (75.8 kg)                  Patient Active Problem List   Diagnosis     Smoking     Dyspepsia     Varicose veins     Hyperlipidemia LDL goal <130     H. pylori infection     Vertigo, benign positional     Essential hypertension     Hyperlipidemia with target LDL less than 130     Health Care Home     Advanced directives, counseling/discussion     Left knee pain     Benign neoplasm of colon     No past surgical history on file.    Social History   Substance Use Topics     Smoking status: Former Smoker     Packs/day: 0.30     Years: 20.00     Types: Cigarettes     Smokeless tobacco: Never Used     Alcohol use No     Family History   Problem Relation Age of Onset     Hypertension Father      Hypertension Brother      CEREBROVASCULAR DISEASE Father          Current Outpatient Prescriptions    Medication Sig Dispense Refill     losartan (COZAAR) 50 MG tablet TAKE 1 TABLET (50 MG) BY MOUTH DAILY 90 tablet 1     simvastatin (ZOCOR) 20 MG tablet Take 1 tablet (20 mg) by mouth At Bedtime 90 tablet 3     fish oil-omega-3 fatty acids 1000 MG capsule Take 2 capsules by mouth daily. 180 capsule 12     triamcinolone (KENALOG) 0.1 % cream Apply sparingly to affected area two times daily for 14 days. (Patient not taking: Reported on 1/16/2018) 80 g 5     triamcinolone (KENALOG) 0.1 % paste Take by mouth 2 times daily (Patient not taking: Reported on 7/11/2017) 5 g 0     propranolol (INDERAL) 10 MG tablet Take 1 tablet (10 mg) by mouth 2 times daily 180 tablet 3     UNABLE TO FIND MEDICATION NAME: PreserVision AREDS 2 formula soft gel. 1 soft gel twice daily       multivitamin, therapeutic with minerals (MULTI-VITAMIN) TABS Take 1 tablet by mouth daily       calcium carbonate 500 MG tablet Take 1 tablet by mouth daily Client taking it twice a day 100 tablet 3     Allergies   Allergen Reactions     Penicillins      Sulfa Drugs Hives     Sulphasomidine      Tetracycline      Recent Labs   Lab Test  07/11/17   1021  12/20/16   1011  12/15/15   1109   06/25/15   1226   12/18/14   1133   10/03/13   1110   08/09/12   1446   LDL   --   59  48   --   69   --   65   < >   --    < >   --    HDL   --   36*  40   --    --    --   41   < >   --    < >   --    TRIG   --   239*  252*   --    --    --   178*   < >   --    < >   --    ALT  30  21  37   < >  39   --   31   < >  74*   < >  66   CR  0.88   --   0.90   --   0.84   < >  0.88   < >  1.05   < >  0.99   GFRESTIMATED  85   --   83   --   90   < >  86   < >  70   < >  75   GFRESTBLACK  >90   GFR Calc     --   >90   GFR Calc     --   >90   GFR Calc     < >  >90   GFR Calc     < >  85   < >  >90   POTASSIUM  4.7   --   4.3   --   4.3   < >  4.4   < >  3.6   < >  4.3   TSH   --    --    --    --    --    --    --    " --   2.04   --   1.82    < > = values in this interval not displayed.              Pneumonia Vaccine:Adults age 65+ who received Pneumovax (PPSV23) at 65 years or older: Should be given PCV13 > 1 year after their most recent PPSV23    ROS:  Constitutional, HEENT, cardiovascular, pulmonary, gi and gu systems are negative, except as otherwise noted.      OBJECTIVE:   /85 (Cuff Size: Adult Regular)  Pulse 65  Temp 97.5  F (36.4  C) (Tympanic)  Resp 16  Ht 5' 7\" (1.702 m)  Wt 162 lb (73.5 kg)  SpO2 98%  BMI 25.37 kg/m2 Estimated body mass index is 25.37 kg/(m^2) as calculated from the following:    Height as of this encounter: 5' 7\" (1.702 m).    Weight as of this encounter: 162 lb (73.5 kg).  EXAM:   GENERAL: healthy, alert and no distress  EYES: Eyes grossly normal to inspection, PERRL and conjunctivae and sclerae normal  HENT: ear canals and TM's normal, nose and mouth without ulcers or lesions  NECK: no adenopathy, no asymmetry, masses, or scars and thyroid normal to palpation  RESP: lungs clear to auscultation - no rales, rhonchi or wheezes  CV: regular rate and rhythm, normal S1 S2, no S3 or S4, no murmur, click or rub, no peripheral edema and peripheral pulses strong  ABDOMEN: soft, nontender, no hepatosplenomegaly, no masses and bowel sounds normal   (male): normal male genitalia without lesions or urethral discharge, no hernia  MS: no gross musculoskeletal defects noted, no edema  NEURO: Normal strength and tone, mentation intact and speech normal  BACK: no CVA tenderness, no paralumbar tenderness  PSYCH: mentation appears normal, affect normal/bright  LYMPH: no cervical, supraclavicular, axillary, or inguinal adenopathy    ASSESSMENT / PLAN:     ASSESSMENT / PLAN:  (Z00.00) Health care maintenance  (primary encounter diagnosis)  Plan: Hemoglobin, Basic metabolic panel  (Ca, Cl,         CO2, Creat, Gluc, K, Na, BUN), ALT, Lipid panel        reflex to direct LDL Fasting, PSA, screen        " "    (Z91.81) At risk for falling      (Z23) Need for prophylactic vaccination against Streptococcus pneumoniae (pneumococcus)      (Z12.5) Screening for prostate cancer  Plan: PSA, screen            (A04.8) H. pylori infection  Comment: has been diagnosed in the past and finished a course of triple regimen, stated having another bought of epigastric pain with dry heaving for last 1 month, has no blood in stool, feeling indigestion and bloating, worsening sx with eating  Will have him to check on H pylori stool test   Plan: H Pylori antigen, stool, OFFICE/OUTPT         VISIT,EST,LEVL II              End of Life Planning:  Patient currently has an advanced directive: Yes.  Practitioner is supportive of decision.    COUNSELING:  Reviewed preventive health counseling, as reflected in patient instructions        Estimated body mass index is 24.9 kg/(m^2) as calculated from the following:    Height as of 12/12/17: 5' 7\" (1.702 m).    Weight as of 12/12/17: 159 lb (72.1 kg).       reports that he has quit smoking. His smoking use included Cigarettes. He has a 6.00 pack-year smoking history. He has never used smokeless tobacco.        Appropriate preventive services were discussed with this patient, including applicable screening as appropriate for cardiovascular disease, diabetes, osteopenia/osteoporosis, and glaucoma.  As appropriate for age/gender, discussed screening for colorectal cancer, prostate cancer, breast cancer, and cervical cancer. Checklist reviewing preventive services available has been given to the patient.    Reviewed patients plan of care and provided an AVS. The Basic Care Plan (routine screening as documented in Health Maintenance) for Liming meets the Care Plan requirement. This Care Plan has been established and reviewed with the Patient.    Counseling Resources:  ATP IV Guidelines  Pooled Cohorts Equation Calculator  Breast Cancer Risk Calculator  FRAX Risk Assessment  ICSI Preventive " Guidelines  Dietary Guidelines for Americans, 2010  USDA's MyPlate  ASA Prophylaxis  Lung CA Screening    David Turcios MD  [facility]

## 2018-01-16 NOTE — MR AVS SNAPSHOT
After Visit Summary   1/16/2018    Roselia العراقي    MRN: [id number]           Patient Information     Date Of Birth          1945        Visit Information        Provider Department      1/16/2018 9:45 AM David Turcios MD; PHONE,  [facility]        Today's Diagnoses     Health care maintenance    -  1    At risk for falling        Need for prophylactic vaccination against Streptococcus pneumoniae (pneumococcus)        Screening for prostate cancer        H. pylori infection          Care Instructions      Preventive Health Recommendations:       Male Ages 65 and over    Yearly exam:             See your health care provider every year in order to  o   Review health changes.   o   Discuss preventive care.    o   Review your medicines if your doctor has prescribed any.    Talk with your health care provider about whether you should have a test to screen for prostate cancer (PSA).    Every 3 years, have a diabetes test (fasting glucose). If you are at risk for diabetes, you should have this test more often.    Every 5 years, have a cholesterol test. Have this test more often if you are at risk for high cholesterol or heart disease.     Every 10 years, have a colonoscopy. Or, have a yearly FIT test (stool test). These exams will check for colon cancer.    Talk to with your health care provider about screening for Abdominal Aortic Aneurysm if you have a family history of AAA or have a history of smoking.  Shots:     Get a flu shot each year.     Get a tetanus shot every 10 years.     Talk to your doctor about your pneumonia vaccines. There are now two you should receive - Pneumovax (PPSV 23) and Prevnar (PCV 13).    Talk to your doctor about a shingles vaccine.     Talk to your doctor about the hepatitis B vaccine.  Nutrition:     Eat at least 5 servings of fruits and vegetables each day.     Eat whole-grain bread, whole-wheat pasta and brown rice instead of white grains and  rice.     Talk to your doctor about Calcium and Vitamin D.   Lifestyle    Exercise for at least 150 minutes a week (30 minutes a day, 5 days a week). This will help you control your weight and prevent disease.     Limit alcohol to one drink per day.     No smoking.     Wear sunscreen to prevent skin cancer.     See your dentist every six months for an exam and cleaning.     See your eye doctor every 1 to 2 years to screen for conditions such as glaucoma, macular degeneration and cataracts.  Preventive Health Recommendations:   Male Ages 65 and over    Yearly exam:             See your health care provider every year in order to  o   Review health changes.   o   Discuss preventive care.    o   Review your medicines if your doctor has prescribed any.  Talk with your health care provider about whether you should have a test to screen for prostate cancer (PSA).  Every 3 years, have a diabetes test (fasting glucose). If you are at risk for diabetes, you should have this test more often.  Every 5 years, have a cholesterol test. Have this test more often if you are at risk for high cholesterol or heart disease.   Every 10 years, have a colonoscopy. Or, have a yearly FIT test (stool test). These exams will check for colon cancer.  Talk to with your health care provider about screening for Abdominal Aortic Aneurysm if you have a family history of AAA or have a history of smoking.    Shots:   Get a flu shot each year.   Get a tetanus shot every 10 years.   Talk to your doctor about your pneumonia vaccines. There are now two you should receive - Pneumovax (PPSV 23) and Prevnar (PCV 13).   Talk to your doctor about a shingles vaccine.   Talk to your doctor about the hepatitis B vaccine.  Nutrition:   Eat at least 5 servings of fruits and vegetables each day.   Eat whole-grain bread, whole-wheat pasta and brown rice instead of white grains and rice.   Talk to your provider about Calcium and Vitamin D.   Lifestyle  Exercise for  at least 150 minutes a week (30 minutes a day, 5 days a week). This will help you control your weight and prevent disease.   Limit alcohol to one drink per day.   No smoking.   Wear sunscreen to prevent skin cancer.   See your dentist every six months for an exam and cleaning.   See your eye doctor every 1 to 2 years to screen for conditions such as glaucoma, macular degeneration, cataracts, etc           Follow-ups after your visit        Future tests that were ordered for you today     Open Future Orders        Priority Expected Expires Ordered    H Pylori antigen, stool Routine  2/15/2018 1/16/2018            Who to contact     If you have questions or need follow up information about today's clinic visit or your schedule please contact [facility] directly at [phone].  Normal or non-critical lab and imaging results will be communicated to you by MyChart, letter or phone within 4 business days after the clinic has received the results. If you do not hear from us within 7 days, please contact the clinic through MyChart or phone. If you have a critical or abnormal lab result, we will notify you by phone as soon as possible.  Submit refill requests through [employer] or call your pharmacy and they will forward the refill request to us. Please allow 3 business days for your refill to be completed.          Additional Information About Your Visit        [employer] [facility] Information     Incre[employer] gives you secure access to your electronic health record. If you see a primary care provider, you can also send messages to your care team and make appointments. If you have questions, please call your primary care clinic.  If you do not have a primary care provider, please call [phone] and they will assist you.        Care EveryWhere ID     This is your Care EveryWhere ID. This could be used by other organizations to access your [facility] medical records  [id number]        Your Vitals Were     Pulse Temperature  "Respirations Height Pulse Oximetry BMI (Body Mass Index)    65 97.5  F (36.4  C) (Tympanic) 16 5' 7\" (1.702 m) 98% 25.37 kg/m2       Blood Pressure from Last 3 Encounters:   01/16/18 134/85   12/12/17 122/76   07/11/17 134/83    Weight from Last 3 Encounters:   01/16/18 162 lb (73.5 kg)   12/12/17 159 lb (72.1 kg)   07/11/17 167 lb (75.8 kg)              We Performed the Following     ALT     Basic metabolic panel  (Ca, Cl, CO2, Creat, Gluc, K, Na, BUN)     Hemoglobin     Lipid panel reflex to direct LDL Fasting     OFFICE/OUTPT VISIT,EST,LEVL II     PSA, screen        Primary Care Provider Office Phone # Fax #    David RIANA Turcios [phone]       [address] [place] [address]        Equal Access to Services     URIEL DAVIS : Hadii alecia ku hadasho Soomaali, waaxda luqadaha, qaybta kaalmada adeegyada, jurgen mckenna . [facility] [phone].    ATENCIÓN: Si habla español, tiene a stapleton disposición servicios gratuitos de asistencia lingüística. Llame al [phone].    We comply with applicable federal civil rights laws and [place] laws. We do not discriminate on the basis of race, color, national origin, age, disability, sex, sexual orientation, or gender identity.            Thank you!     Thank you for choosing [facility]  for your care. Our goal is always to provide you with excellent care. Hearing back from our patients is one way we can continue to improve our services. Please take a few minutes to complete the written survey that you may receive in the mail after your visit with us. Thank you!             Your Updated Medication List - Protect others around you: Learn how to safely use, store and throw away your medicines at www.disposemymeds.org.          This list is accurate as of: 1/16/18 10:22 AM.  Always use your most recent med list.                   Brand Name Dispense Instructions for use Diagnosis    calcium carbonate 1250 MG " tablet    OS-ALEXANDRIA 500 mg Venetie IRA. Ca    100 tablet    Take 1 tablet by mouth daily Client taking it twice a day        fish oil-omega-3 fatty acids 1000 MG capsule     180 capsule    Take 2 capsules by mouth daily.        losartan 50 MG tablet    COZAAR    90 tablet    TAKE 1 TABLET (50 MG) BY MOUTH DAILY    Essential hypertension       Multi-vitamin Tabs tablet      Take 1 tablet by mouth daily        propranolol 10 MG tablet    INDERAL    180 tablet    Take 1 tablet (10 mg) by mouth 2 times daily    Benign essential hypertension       simvastatin 20 MG tablet    ZOCOR    90 tablet    Take 1 tablet (20 mg) by mouth At Bedtime    Hyperlipidemia with target LDL less than 130       triamcinolone 0.1 % cream    KENALOG    80 g    Apply sparingly to affected area two times daily for 14 days.    Eczema, unspecified type       triamcinolone 0.1 % paste    KENALOG    5 g    Take by mouth 2 times daily    Mouth sore, Oral aphthae       UNABLE TO FIND      MEDICATION NAME: PreserVision AREDS 2 formula soft gel. 1 soft gel twice daily

## 2018-01-16 NOTE — NURSING NOTE
"Chief Complaint   Patient presents with     Medicare Visit       Initial /85 (Cuff Size: Adult Regular)  Pulse 65  Temp 97.5  F (36.4  C) (Tympanic)  Resp 16  Ht 5' 7\" (1.702 m)  Wt 162 lb (73.5 kg)  SpO2 98%  BMI 25.37 kg/m2 Estimated body mass index is 25.37 kg/(m^2) as calculated from the following:    Height as of this encounter: 5' 7\" (1.702 m).    Weight as of this encounter: 162 lb (73.5 kg).  Medication Reconciliation: complete   Eliana Burgos, CMA    "

## 2018-01-17 LAB — PSA SERPL-ACNC: 3.23 UG/L (ref 0–4)

## 2018-01-18 DIAGNOSIS — A04.8 H. PYLORI INFECTION: ICD-10-CM

## 2018-01-18 PROCEDURE — 87338 HPYLORI STOOL AG IA: CPT | Performed by: FAMILY MEDICINE

## 2018-01-19 ENCOUNTER — CARE COORDINATION (OUTPATIENT)
Dept: GERIATRIC MEDICINE | Facility: CLINIC | Age: 73
End: 2018-01-19

## 2018-01-19 LAB
H PYLORI AG STL QL IA: NORMAL
SPECIMEN SOURCE: NORMAL

## 2018-01-19 NOTE — PROGRESS NOTES
Received a call from Raven from [facility] stating that client's ADC auth ended on 12/30/17. We have that the auth is supposed to go til end of April. Raven states that this also happened with another one of her clients from [facility] and the auth needs to be refaxed to [facility]. Spouse Jac Brown had same auth info sent to [employer] and it was processed correctly per Raven. Request sent to CMS to please resubmit the ADC auth to [facility].   Chanda Pollack RN, PHN, [facility]   [phone]

## 2018-01-25 NOTE — PROGRESS NOTES
Late entry. Per CMS obdulia Lanza was resent to [employer] for ADC on 1/19/18.  Chanda Pollack RN, PHN, [facility]   [phone]

## 2018-02-02 ENCOUNTER — TELEPHONE (OUTPATIENT)
Dept: FAMILY MEDICINE | Facility: CLINIC | Age: 73
End: 2018-02-02

## 2018-02-02 NOTE — TELEPHONE ENCOUNTER
[facility]  forms received  Via fax  Placed in PCP bin for review/signature  Lavinia REIS

## 2018-03-08 ENCOUNTER — CARE COORDINATION (OUTPATIENT)
Dept: GERIATRIC MEDICINE | Facility: CLINIC | Age: 73
End: 2018-03-08

## 2018-03-08 NOTE — PROGRESS NOTES
Received a message from Jabari from [employer] at [phone] stating that the homemaking auth for member and spouse ended Dec 31 and this CM had auth going until end of April. [employer] has been having issues with a lot of their auths ending in Decmenber, rather than at the appointed time. Jabari spoke with [employer] about this and they fixed the issue and will pay claims after 12/31/17 until the SA ends.  Chanda Pollack RN, PHN, [facility]   [phone]

## 2018-03-19 ENCOUNTER — CARE COORDINATION (OUTPATIENT)
Dept: GERIATRIC MEDICINE | Facility: CLINIC | Age: 73
End: 2018-03-19

## 2018-03-19 NOTE — PROGRESS NOTES
Member due for an annual health risk reassessment visit soon. Called daughter Emilee to see when would be a good time for member and daughter to attend. Member and spouse attend ADC on Monday, Wednesdays and Fridays. Appt made to see member and spouse for Thursday. Called Jenni Owens with request for a Mandarin  and they will call once they get an  assigned.   Chanda Pollack RN, PHN, [facility]   [phone]

## 2018-03-22 ENCOUNTER — PATIENT OUTREACH (OUTPATIENT)
Dept: GERIATRIC MEDICINE | Facility: CLINIC | Age: 73
End: 2018-03-22

## 2018-03-22 ASSESSMENT — ACTIVITIES OF DAILY LIVING (ADL)
DEPENDENT_IADLS:: CLEANING;COOKING;LAUNDRY;SHOPPING;MEAL PREPARATION;MEDICATION MANAGEMENT;MONEY MANAGEMENT;TRANSPORATION

## 2018-03-22 NOTE — PROGRESS NOTES
[facility] Home Visit Assessment     Home visit for Health Risk Assessment with Roselia العراقي completed on March 22, 2018.  Member resides in [place] and lives with spouse. Spends most of week at daughter's home and weekends at his apt with wife.   Present at assessment: member, this care coordinator,  Natasha Howell, spouse Vj Brown and adult daughter Emilee العراقي.    Current Care Plan  Member currently receiving the following services: Adult Day Care, Homemaking, PCA and EW transportation.  Medication Review  Medication reconciliation completed in Epic:Yes.  Medication set-up & administration: Family/informal caregiver sets up weekly.  Self-administers medications.   Medication understanding concerns (by member, family or CC): No  Medication adherence concerns (by member, family or CC): No  Mental/Behavioral Health   Depression Screening: See PHQ assessment flowsheet.   Mental Health Diagnosis: No  No current MH services-needed or wanted per member.  Falls in last 12 months: Yes. Due to poor vision, does not always see environment and slipped on some water inside daughter's home. Was an injury sustained?  No    ADL/IADL Dependencies: Ambulation-no assistive device, dressing and grooming.  [facility] Plan sponsored benefits: Shared information re: Silver Sneakers/gym memberships, ASA, Calcium +D.  PCA Assessment completed at visit: Yes   Elderly Waiver Eligibility: Yes-will continue on EW.    Care Plan & Recommendations: Continue with ADC, MA and EW transportation, PCA, homemaking, and case management.    See [facility]CC for detailed assessment information.    Follow-Up Plan: Member informed of future contact, plan to f/u with member with a 6 month telephone assessment.  Contact information shared with member and family, encouraged member to call with any questions or concerns at any time.  [facility] care continuum providers: Please refer to Health Care Home on the [facility] Problem List to  view this patient's [facility] Care Plan Summary.  Chanda Pollack RN, PHN, [facility]   [phone]

## 2018-03-23 ASSESSMENT — PATIENT HEALTH QUESTIONNAIRE - PHQ9: SUM OF ALL RESPONSES TO PHQ QUESTIONS 1-9: 0

## 2018-03-28 ENCOUNTER — PATIENT OUTREACH (OUTPATIENT)
Dept: GERIATRIC MEDICINE | Facility: CLINIC | Age: 73
End: 2018-03-28

## 2018-03-28 NOTE — PROGRESS NOTES
[facility] Care Coordination Contact  MMIS completed and entered. Member continues to be a rate cell B. PCA assessment shows member continues to qualify for 9 units of PCA daily. Called Kimberlee at [employer] to let her know PCA results and homemaking hours and auth span-all remain the same for this next year. CPS updated in epic. Called daughter Emilee to to her know results of the PCA assessment. No changes in POC. Chart to CMS to process PCA assessment, auths, and to send POC and PCA assessment to member.   Chanda Pollack RN, PHN, [facility]   [phone]

## 2018-03-29 ENCOUNTER — PATIENT OUTREACH (OUTPATIENT)
Dept: GERIATRIC MEDICINE | Facility: CLINIC | Age: 73
End: 2018-03-29

## 2018-03-29 NOTE — PROGRESS NOTES
[facility] Care Coordination Contact  Received after visit chart from care coordinator.  Completed following tasks: Mailed copy of care plan to client, Updated services in access, Submitted referrals/auths for PCA, ADC, and homemaking and faxed PRESLEY to HIMs  Chart was returned to CC.    Provider Signature - No POC Shared:  Member indicates that they do not want their POC shared with any EW providers.     [employer]:  Faxed completed PCA assessment to [facility] and mailed copies to member.  Faxed MD Communication to PCP.  Emailed referral form for auth to [employer].    Maya Pat  CMS Supervisor  [facility]  [phone]

## 2018-04-17 ENCOUNTER — TELEPHONE (OUTPATIENT)
Dept: FAMILY MEDICINE | Facility: CLINIC | Age: 73
End: 2018-04-17

## 2018-04-17 ENCOUNTER — OFFICE VISIT (OUTPATIENT)
Dept: FAMILY MEDICINE | Facility: CLINIC | Age: 73
End: 2018-04-17
Payer: COMMERCIAL

## 2018-04-17 VITALS
WEIGHT: 169 LBS | RESPIRATION RATE: 16 BRPM | TEMPERATURE: 97.2 F | SYSTOLIC BLOOD PRESSURE: 127 MMHG | DIASTOLIC BLOOD PRESSURE: 75 MMHG | HEART RATE: 74 BPM | OXYGEN SATURATION: 96 % | BODY MASS INDEX: 26.53 KG/M2 | HEIGHT: 67 IN

## 2018-04-17 DIAGNOSIS — M25.561 ACUTE PAIN OF RIGHT KNEE: ICD-10-CM

## 2018-04-17 DIAGNOSIS — M54.9 UPPER BACK PAIN: Primary | ICD-10-CM

## 2018-04-17 DIAGNOSIS — I10 ESSENTIAL HYPERTENSION: ICD-10-CM

## 2018-04-17 DIAGNOSIS — M62.838 NECK MUSCLE SPASM: Primary | ICD-10-CM

## 2018-04-17 PROCEDURE — 99214 OFFICE O/P EST MOD 30 MIN: CPT | Performed by: FAMILY MEDICINE

## 2018-04-17 RX ORDER — CYCLOBENZAPRINE HCL 5 MG
5 TABLET ORAL 2 TIMES DAILY PRN
Qty: 28 TABLET | Refills: 1 | Status: SHIPPED | OUTPATIENT
Start: 2018-04-17 | End: 2018-04-19

## 2018-04-17 RX ORDER — NABUMETONE 500 MG/1
500 TABLET, FILM COATED ORAL 2 TIMES DAILY
Qty: 28 TABLET | Refills: 1 | Status: SHIPPED | OUTPATIENT
Start: 2018-04-17 | End: 2019-09-24

## 2018-04-17 RX ORDER — LOSARTAN POTASSIUM 50 MG/1
TABLET ORAL
Qty: 90 TABLET | Refills: 1 | Status: SHIPPED | OUTPATIENT
Start: 2018-04-17 | End: 2018-11-21

## 2018-04-17 NOTE — TELEPHONE ENCOUNTER
Name of caller: Emilee  Relationship to Patient: Daughter    Reason for Call:  Emilee said [facility]'s insurance doesn't cover cyclobenzaprine and they would like an alternative.    Best phone number to reach pt at is: [phone]  Ok to leave a message with medical info? yes    Noris Cox  Patient Representative

## 2018-04-17 NOTE — MR AVS SNAPSHOT
After Visit Summary   4/17/2018    Roselia العراقي    MRN: [id number]           Patient Information     Date Of Birth          1945        Visit Information        Provider Department      4/17/2018 3:15 PM David Turcios MD; [employer] [facility] [place]        Today's Diagnoses     Upper back pain    -  1    Essential hypertension        Acute pain of right knee           Follow-ups after your visit        Additional Services     [facility] PT, HAND, AND CHIROPRACTIC REFERRAL       **This order will print in the [facility] Scheduling Office**    Physical Therapy, Hand Therapy and Chiropractic Care are available through:    *[facility]  *[facility]  *[facility]    Call one number to schedule at any of the above locations: [phone].    Your provider has referred you to: Physical Therapy at [facility] or [facility]    Indication/Reason for Referral: upper back and right knee pain   Onset of Illness: acute on chronic   Therapy Orders: Evaluate and Treat  Special Programs: None  Special Request: None    London Callahan      Additional Comments for the Therapist or Chiropractor: none     Please be aware that coverage of these services is subject to the terms and limitations of your health insurance plan.  Call member services at your health plan with any benefit or coverage questions.      Please bring the following to your appointment:    *Your personal calendar for scheduling future appointments  *Comfortable clothing                  Follow-up notes from your care team     Return in about 8 months (around 12/17/2018) for Physical Exam.      Who to contact     If you have questions or need follow up information about today's clinic visit or your schedule please contact [facility] [place] [facility] directly at [phone].  Normal or non-critical lab and imaging results will be communicated to you by MyChart, letter or phone within 4 business  "days after the clinic has received the results. If you do not hear from us within 7 days, please contact the clinic through [employer] or phone. If you have a critical or abnormal lab result, we will notify you by phone as soon as possible.  Submit refill requests through [employer] or call your pharmacy and they will forward the refill request to us. Please allow 3 business days for your refill to be completed.          Additional Information About Your Visit        [employer] Information     [employer] gives you secure access to your electronic health record. If you see a primary care provider, you can also send messages to your care team and make appointments. If you have questions, please call your primary care clinic.  If you do not have a primary care provider, please call [phone] and they will assist you.        Care EveryWhere ID     This is your Care EveryWhere ID. This could be used by other organizations to access your [place] medical records  [id number]        Your Vitals Were     Pulse Temperature Respirations Height Pulse Oximetry BMI (Body Mass Index)    74 97.2  F (36.2  C) (Tympanic) 16 5' 7\" (1.702 m) 96% 26.47 kg/m2       Blood Pressure from Last 3 Encounters:   04/17/18 127/75   01/16/18 134/85   12/12/17 122/76    Weight from Last 3 Encounters:   04/17/18 169 lb (76.7 kg)   01/16/18 162 lb (73.5 kg)   12/12/17 159 lb (72.1 kg)              We Performed the Following     [facility] PT, HAND, AND CHIROPRACTIC REFERRAL          Today's Medication Changes          These changes are accurate as of 4/17/18  4:00 PM.  If you have any questions, ask your nurse or doctor.               Start taking these medicines.        Dose/Directions    cyclobenzaprine 5 MG tablet   Commonly known as:  FLEXERIL   Used for:  Upper back pain, Acute pain of right knee   Started by:  David Turcios MD        Dose:  5 mg   Take 1 tablet (5 mg) by mouth 2 times daily as needed for muscle spasms   Quantity:  28 tablet   Refills:  1    "    nabumetone 500 MG tablet   Commonly known as:  RELAFEN   Used for:  Upper back pain, Acute pain of right knee   Started by:  David Turcios MD        Dose:  500 mg   Take 1 tablet (500 mg) by mouth 2 times daily   Quantity:  28 tablet   Refills:  1         These medicines have changed or have updated prescriptions.        Dose/Directions    losartan 50 MG tablet   Commonly known as:  COZAAR   This may have changed:  See the new instructions.   Used for:  Essential hypertension   Changed by:  David Turcios MD        TAKE 1 TABLET (50 MG) BY MOUTH DAILY   Quantity:  90 tablet   Refills:  1            Where to get your medicines      These medications were sent to [place] [address] [facility] [address] [facility]  [address] [facility] [address]     Phone:  [phone]     cyclobenzaprine 5 MG tablet    losartan 50 MG tablet    nabumetone 500 MG tablet                Primary Care Provider Office Phone # Fax #    David Turcios [phone] [address] [facility] [address]        Equal Access to Services     [facility]: Hadii aad ku hadasho Soomaali, waaxda luqadaha, qaybta kaalmada adeegyada, waxay idiin hayheather mckenna . So [facility] [phone].    ATENCIÓN: Si habla español, tiene a stapleton disposición servicios gratuitos de asistencia lingüística. Llame al [phone].    We comply with applicable federal civil rights laws and [place] laws. We do not discriminate on the basis of race, color, national origin, age, disability, sex, sexual orientation, or gender identity.            Thank you!     Thank you for choosing [facility]  for your care. Our goal is always to provide you with excellent care. Hearing back from our patients is one way we can continue to improve our services. Please take a few minutes to complete the written survey that you may receive in the mail after your visit with us. Thank you!             Your  Updated Medication List - Protect others around you: Learn how to safely use, store and throw away your medicines at www.disposemymeds.org.          This list is accurate as of 4/17/18  4:00 PM.  Always use your most recent med list.                   Brand Name Dispense Instructions for use Diagnosis    calcium carbonate 1250 MG tablet    OS-AL[place] 500 mg [place]. Ca    100 tablet    Take 1 tablet by mouth daily Client taking it twice a day        cyclobenzaprine 5 MG tablet    FLEXERIL    28 tablet    Take 1 tablet (5 mg) by mouth 2 times daily as needed for muscle spasms    Upper back pain, Acute pain of right knee       fish oil-omega-3 fatty acids 1000 MG capsule     180 capsule    Take 2 capsules by mouth daily.        losartan 50 MG tablet    COZAAR    90 tablet    TAKE 1 TABLET (50 MG) BY MOUTH DAILY    Essential hypertension       MOTRIN IB PO           Multi-vitamin Tabs tablet      Take 1 tablet by mouth daily        nabumetone 500 MG tablet    RELAFEN    28 tablet    Take 1 tablet (500 mg) by mouth 2 times daily    Upper back pain, Acute pain of right knee       propranolol 10 MG tablet    INDERAL    180 tablet    Take 1 tablet (10 mg) by mouth 2 times daily    Benign essential hypertension       simvastatin 20 MG tablet    ZOCOR    90 tablet    Take 1 tablet (20 mg) by mouth At Bedtime    Hyperlipidemia with target LDL less than 130       triamcinolone 0.1 % cream    KENALOG    80 g    Apply sparingly to affected area two times daily for 14 days.    Eczema, unspecified type       triamcinolone 0.1 % paste    KENALOG    5 g    Take by mouth 2 times daily    Mouth sore, Oral aphthae       UNABLE TO FIND      MEDICATION NAME: PreserVision AREDS 2 formula soft gel. 1 soft gel twice daily

## 2018-04-17 NOTE — NURSING NOTE
"Chief Complaint   Patient presents with     Back Pain       Initial /75 (Cuff Size: Adult Regular)  Pulse 74  Temp 97.2  F (36.2  C) (Tympanic)  Resp 16  Ht 5' 7\" (1.702 m)  Wt 169 lb (76.7 kg)  SpO2 96%  BMI 26.47 kg/m2 Estimated body mass index is 26.47 kg/(m^2) as calculated from the following:    Height as of this encounter: 5' 7\" (1.702 m).    Weight as of this encounter: 169 lb (76.7 kg).  Medication Reconciliation: complete   Eliana Burgos, CMA    "

## 2018-04-17 NOTE — PROGRESS NOTES
SUBJECTIVE:   Roselia العراقي is a 73 year old male who presents to clinic today for the following health issues:      Musculoskeletal problem/pain      Duration: 2 weeks     Description  Location: back pain after climbing stairs     Intensity:  moderate    Accompanying signs and symptoms: knee pain     History  Previous similar problem: no   Previous evaluation:  none    Precipitating or alleviating factors:  Trauma or overuse: YES, climbing stairs   Aggravating factors include: walking    Therapies tried and outcome: nothing    Problem list and histories reviewed & adjusted, as indicated.  Additional history: as documented    Patient Active Problem List   Diagnosis     Smoking     Dyspepsia     Varicose veins     Hyperlipidemia LDL goal <130     H. pylori infection     Vertigo, benign positional     Essential hypertension     Hyperlipidemia with target LDL less than 130     Health Care Home     Advanced directives, counseling/discussion     Left knee pain     Benign neoplasm of colon     No past surgical history on file.    Social History   Substance Use Topics     Smoking status: Former Smoker     Packs/day: 0.30     Years: 20.00     Types: Cigarettes     Smokeless tobacco: Never Used     Alcohol use No     Family History   Problem Relation Age of Onset     Hypertension Father      Hypertension Brother      CEREBROVASCULAR DISEASE Father          Current Outpatient Prescriptions   Medication Sig Dispense Refill     losartan (COZAAR) 50 MG tablet TAKE 1 TABLET (50 MG) BY MOUTH DAILY 90 tablet 1     nabumetone (RELAFEN) 500 MG tablet Take 1 tablet (500 mg) by mouth 2 times daily 28 tablet 1     cyclobenzaprine (FLEXERIL) 5 MG tablet Take 1 tablet (5 mg) by mouth 2 times daily as needed for muscle spasms 28 tablet 1     MOTRIN IB PO        triamcinolone (KENALOG) 0.1 % cream Apply sparingly to affected area two times daily for 14 days. 80 g 5     simvastatin (ZOCOR) 20 MG tablet Take 1 tablet (20 mg) by mouth At  Bedtime 90 tablet 3     triamcinolone (KENALOG) 0.1 % paste Take by mouth 2 times daily 5 g 0     propranolol (INDERAL) 10 MG tablet Take 1 tablet (10 mg) by mouth 2 times daily 180 tablet 3     UNABLE TO FIND MEDICATION NAME: PreserVision AREDS 2 formula soft gel. 1 soft gel twice daily       multivitamin, therapeutic with minerals (MULTI-VITAMIN) TABS Take 1 tablet by mouth daily       calcium carbonate 500 MG tablet Take 1 tablet by mouth daily Client taking it twice a day 100 tablet 3     fish oil-omega-3 fatty acids 1000 MG capsule Take 2 capsules by mouth daily. 180 capsule 12     [DISCONTINUED] losartan (COZAAR) 50 MG tablet TAKE 1 TABLET (50 MG) BY MOUTH DAILY 90 tablet 1     Allergies   Allergen Reactions     Penicillins      Sulfa Drugs Hives     Sulphasomidine      Tetracycline      Recent Labs   Lab Test  01/16/18   1020  07/11/17   1021  12/20/16   1011  12/15/15   1109   10/03/13   1110   08/09/12   1446   LDL  45   --   59  48   < >   --    < >   --    HDL  44   --   36*  40   < >   --    < >   --    TRIG  221*   --   239*  252*   < >   --    < >   --    ALT  23  30  21  37   < >  74*   < >  66   CR  0.81  0.88   --   0.90   < >  1.05   < >  0.99   GFRESTIMATED  >90  85   --   83   < >  70   < >  75   GFRESTBLACK  >90  >90  African American GFR Calc     --   >90   GFR Calc     < >  85   < >  >90   POTASSIUM  4.3  4.7   --   4.3   < >  3.6   < >  4.3   TSH   --    --    --    --    --   2.04   --   1.82    < > = values in this interval not displayed.      BP Readings from Last 3 Encounters:   04/17/18 127/75   01/16/18 134/85   12/12/17 122/76    Wt Readings from Last 3 Encounters:   04/17/18 169 lb (76.7 kg)   01/16/18 162 lb (73.5 kg)   12/12/17 159 lb (72.1 kg)                    Reviewed and updated as needed this visit by clinical staff  Allergies       Reviewed and updated as needed this visit by Provider         ROS:  Constitutional, HEENT, cardiovascular, pulmonary, gi and gu  "systems are negative, except as otherwise noted.    OBJECTIVE:     /75 (Cuff Size: Adult Regular)  Pulse 74  Temp 97.2  F (36.2  C) (Tympanic)  Resp 16  Ht 5' 7\" (1.702 m)  Wt 169 lb (76.7 kg)  SpO2 96%  BMI 26.47 kg/m2  Body mass index is 26.47 kg/(m^2).  GENERAL: healthy, alert and no distress  NECK: no adenopathy, no asymmetry, masses, or scars and thyroid normal to palpation  RESP: lungs clear to auscultation - no rales, rhonchi or wheezes  CV: regular rate and rhythm, normal S1 S2, no S3 or S4, no murmur, click or rub, no peripheral edema and peripheral pulses strong  ABDOMEN: soft, nontender, no hepatosplenomegaly, no masses and bowel sounds normal  MS: no gross musculoskeletal defects noted, no edema        ASSESSMENT/PLAN:   ASSESSMENT / PLAN:  (M54.9) Upper back pain  (primary encounter diagnosis)  Comment: stiff upper back to neck, has no radiculopathy, started after heavy lifting, will have him to try NSAIDs and muscle relaxant, will have him to try PT  As well   Plan: nabumetone (RELAFEN) 500 MG tablet,         cyclobenzaprine (FLEXERIL) 5 MG tablet, [facility] PT,        HAND, AND CHIROPRACTIC REFERRAL            (I10) Essential hypertension  Comment: stable with current dose of med, has no side effect from it, will keep watching sx   Plan: losartan (COZAAR) 50 MG tablet            (M25.561) Acute pain of right knee  Comment: started about  2 weeks ago after standing and walking , has mild swelling, has no radiculopathy, will have to try conservative management with PT and NSAIDs   Plan: nabumetone (RELAFEN) 500 MG tablet,         cyclobenzaprine (FLEXERIL) 5 MG tablet, [facility] PT,        HAND, AND CHIROPRACTIC REFERRAL                FUTURE APPOINTMENTS:       - Follow-up visit in 8 months for CPE    David Turcios MD  [facility]    "

## 2018-04-17 NOTE — TELEPHONE ENCOUNTER
Daughter will call insurance and find out what other medication is in the drug class that their insurance will cover and call us back.  Gisela Sainz RN - Triage  [facility]

## 2018-04-19 RX ORDER — TIZANIDINE 2 MG/1
2 TABLET ORAL 2 TIMES DAILY
Qty: 30 TABLET | Refills: 1 | Status: SHIPPED | OUTPATIENT
Start: 2018-04-19 | End: 2019-09-24

## 2018-04-19 NOTE — TELEPHONE ENCOUNTER
Hi Dr Turcios,    I let the daughter know to check with the pharmacy re about the change to tizanidine.  Thank you.  Chanda Pollack RN, PHN, [facility]   [phone]'

## 2018-04-19 NOTE — TELEPHONE ENCOUNTER
Hi,    I am the  for member and daughter Emilee contacted me about the med not being covered. I called [facility] at [phone] and was told that in persons over the age of 65, this med can increase the risk of falls so if Dr Turcios wants this med, a PA would need to be done. Otherwise the pharmacy said that Tizanidine 2 mg is covered.   The pharmacy was also going to be sending a note as well. Please let Emilee know what is decided. Thank you,  Chanda Pollack RN, PHN, [facility]   [phone]

## 2018-05-29 ENCOUNTER — PATIENT OUTREACH (OUTPATIENT)
Dept: GERIATRIC MEDICINE | Facility: CLINIC | Age: 73
End: 2018-05-29

## 2018-05-29 NOTE — PROGRESS NOTES
[facility] Care Coordination Contact  Daughter Emilee states that member is no longer eligible for Social Security 6/1/18 and wondered what member can do for insurance. Asked why member will no longer be eligible and was told that member needs to submit sponsors income and assets. Emilee said that her uncle is member's sponsor and she sent this info to Social Security last week. Emilee said that she thought that she only needed to send sponsor info for 5 yrs only. Recommended Emilee to contact SS to see if they got the sponsor paper work and to see how long she needs to send sponsor info in.  Emilee asked what member would do if his SS ended 6/1/18 and what member would do for insurance. Explained that the health plan keeps people for 3 months after losing their MA. Emilee also asked about [place] if needed. Gave her this info as well and recommended contacting SS.  Chanda Pollack RN, PHN, [facility]   [phone]

## 2018-06-01 ENCOUNTER — TELEPHONE (OUTPATIENT)
Dept: FAMILY MEDICINE | Facility: CLINIC | Age: 73
End: 2018-06-01

## 2018-06-01 NOTE — TELEPHONE ENCOUNTER
Form completed and at the . Daughter, Emilee, will  the form. New consent included with the form. Copy sent to abstraction.  Lissy Panchal,

## 2018-06-06 NOTE — PROGRESS NOTES
[facility] Care Coordination Contact  Received a call from Gertrudis Oconnor from [facility] stating that it is not member's SS that is an issue as she went with member to [facility] on 5/21/18.  Called [facility] and was told that member needs to have an Alien Sponsor Asset Form completed by the out of state sponsor, not his wife as sponsor, and also to have the sponsors assets and income from within the past 30 days. [facility] will resend these forms to member.   Chanda Pollack RN, PHN, [facility]   [phone]

## 2018-06-13 NOTE — PROGRESS NOTES
[facility] Care Coordination Contact  Received a message from Brianna from [employer] at [phone] ext [phone] asking about the lapse in insurance coverage for member for this month and if it was being worked on. Called Brianna back and no answer. ML that family was working on getting insurance back on and that hopefully it will retro back to June 1. Also that this CM had checked with [employer] and they typically cover for 3 months after losing MA but to call [employer] as well. To call this CM back if has questions.  Chanda Pollack RN, PHN, [facility]   [phone]

## 2018-06-20 ENCOUNTER — OFFICE VISIT (OUTPATIENT)
Dept: FAMILY MEDICINE | Facility: CLINIC | Age: 73
End: 2018-06-20
Payer: COMMERCIAL

## 2018-06-20 VITALS
BODY MASS INDEX: 26.53 KG/M2 | RESPIRATION RATE: 12 BRPM | SYSTOLIC BLOOD PRESSURE: 112 MMHG | DIASTOLIC BLOOD PRESSURE: 68 MMHG | OXYGEN SATURATION: 94 % | HEIGHT: 67 IN | TEMPERATURE: 96.7 F | HEART RATE: 85 BPM | WEIGHT: 169 LBS

## 2018-06-20 DIAGNOSIS — M17.11 PRIMARY OSTEOARTHRITIS OF RIGHT KNEE: ICD-10-CM

## 2018-06-20 DIAGNOSIS — G62.9 PERIPHERAL POLYNEUROPATHY: ICD-10-CM

## 2018-06-20 DIAGNOSIS — H90.3 SNHL (SENSORY-NEURAL HEARING LOSS), ASYMMETRICAL: Primary | ICD-10-CM

## 2018-06-20 DIAGNOSIS — H61.20 WAX IN EAR: ICD-10-CM

## 2018-06-20 PROCEDURE — 99214 OFFICE O/P EST MOD 30 MIN: CPT | Mod: 25 | Performed by: FAMILY MEDICINE

## 2018-06-20 PROCEDURE — 69210 REMOVE IMPACTED EAR WAX UNI: CPT | Performed by: FAMILY MEDICINE

## 2018-06-20 RX ORDER — PREDNISONE 20 MG/1
20 TABLET ORAL 2 TIMES DAILY
Qty: 10 TABLET | Refills: 0 | Status: SHIPPED | OUTPATIENT
Start: 2018-06-20 | End: 2019-03-21

## 2018-06-20 NOTE — PROGRESS NOTES
SUBJECTIVE:   Roselia العراقي is a 73 year old male who presents to clinic today for the following health issues:      Hearing Problem      Duration: 2 weeks     Description (location/character/radiation): trouble hearing     Intensity:  moderate    Accompanying signs and symptoms: none     History (similar episodes/previous evaluation): None    Precipitating or alleviating factors: None    Therapies tried and outcome: None     Problem list and histories reviewed & adjusted, as indicated.  Additional history: as documented    Patient Active Problem List   Diagnosis     Smoking     Dyspepsia     Varicose veins     Hyperlipidemia LDL goal <130     H. pylori infection     Vertigo, benign positional     Essential hypertension     Hyperlipidemia with target LDL less than 130     Health Care Home     Advanced directives, counseling/discussion     Left knee pain     Benign neoplasm of colon     No past surgical history on file.    Social History   Substance Use Topics     Smoking status: Former Smoker     Packs/day: 0.30     Years: 20.00     Types: Cigarettes     Smokeless tobacco: Never Used     Alcohol use No     Family History   Problem Relation Age of Onset     Hypertension Father      Hypertension Brother      Cerebrovascular Disease Father          Current Outpatient Prescriptions   Medication Sig Dispense Refill     fish oil-omega-3 fatty acids 1000 MG capsule Take 2 capsules by mouth daily. 180 capsule 12     losartan (COZAAR) 50 MG tablet TAKE 1 TABLET (50 MG) BY MOUTH DAILY 90 tablet 1     nabumetone (RELAFEN) 500 MG tablet Take 1 tablet (500 mg) by mouth 2 times daily 28 tablet 1     predniSONE (DELTASONE) 20 MG tablet Take 1 tablet (20 mg) by mouth 2 times daily 10 tablet 0     propranolol (INDERAL) 10 MG tablet Take 1 tablet (10 mg) by mouth 2 times daily 180 tablet 3     simvastatin (ZOCOR) 20 MG tablet Take 1 tablet (20 mg) by mouth At Bedtime 90 tablet 3     triamcinolone (KENALOG) 0.1 % cream Apply sparingly  to affected area two times daily for 14 days. 80 g 5     triamcinolone (KENALOG) 0.1 % paste Take by mouth 2 times daily 5 g 0     UNABLE TO FIND MEDICATION NAME: PreserVision AREDS 2 formula soft gel. 1 soft gel twice daily       calcium carbonate 500 MG tablet Take 1 tablet by mouth daily Client taking it twice a day 100 tablet 3     MOTRIN IB PO        multivitamin, therapeutic with minerals (MULTI-VITAMIN) TABS Take 1 tablet by mouth daily       tiZANidine (ZANAFLEX) 2 MG tablet Take 1 tablet (2 mg) by mouth 2 times daily (Patient not taking: Reported on 6/20/2018) 30 tablet 1     Allergies   Allergen Reactions     Penicillins      Sulfa Drugs Hives     Sulphasomidine      Tetracycline      Recent Labs   Lab Test  01/16/18   1020  07/11/17   1021  12/20/16   1011  12/15/15   1109   10/03/13   1110   08/09/12   1446   LDL  45   --   59  48   < >   --    < >   --    HDL  44   --   36*  40   < >   --    < >   --    TRIG  221*   --   239*  252*   < >   --    < >   --    ALT  23  30  21  37   < >  74*   < >  66   CR  0.81  0.88   --   0.90   < >  1.05   < >  0.99   GFRESTIMATED  >90  85   --   83   < >  70   < >  75   GFRESTBLACK  >90  >90  African American GFR Calc     --   >90   GFR Calc     < >  85   < >  >90   POTASSIUM  4.3  4.7   --   4.3   < >  3.6   < >  4.3   TSH   --    --    --    --    --   2.04   --   1.82    < > = values in this interval not displayed.      BP Readings from Last 3 Encounters:   06/20/18 112/68   04/17/18 127/75   01/16/18 134/85    Wt Readings from Last 3 Encounters:   06/20/18 169 lb (76.7 kg)   04/17/18 169 lb (76.7 kg)   01/16/18 162 lb (73.5 kg)          Reviewed and updated as needed this visit by clinical staff       Reviewed and updated as needed this visit by Provider         ROS:  Constitutional, HEENT, cardiovascular, pulmonary, gi and gu systems are negative, except as otherwise noted.    OBJECTIVE:     /68 (Cuff Size: Adult Regular)  Pulse 85  Temp  "96.7  F (35.9  C) (Tympanic)  Resp 12  Ht 5' 7\" (1.702 m)  Wt 169 lb (76.7 kg)  SpO2 94%  BMI 26.47 kg/m2  Body mass index is 26.47 kg/(m^2).  GENERAL: healthy, alert and no distress  NECK: no adenopathy, no asymmetry, masses, or scars and thyroid normal to palpation  RESP: lungs clear to auscultation - no rales, rhonchi or wheezes  CV: regular rate and rhythm, normal S1 S2, no S3 or S4, no murmur, click or rub, no peripheral edema and peripheral pulses strong  ABDOMEN: soft, nontender, no hepatosplenomegaly, no masses and bowel sounds normal  MS: no gross musculoskeletal defects noted, no edema        ASSESSMENT/PLAN:     ASSESSMENT / PLAN:  (H90.5) SNHL (sensory-neural hearing loss), asymmetrical  (primary encounter diagnosis)  Comment: worsening, was seen at ENT, will re-refer her for further evaluation   Plan: OTOLARYNGOLOGY REFERRAL            (H61.20) Wax in ear  Comment: removed with ENT alligator forceps, still has small pieces and pt declined to remove with water them   Plan: REMOVE IMPACTED CERUMEN            (M17.11) Primary osteoarthritis of right knee  Comment: on right knee, mild to moderate stiffness, will have him to try prednisone   Plan: predniSONE (DELTASONE) 20 MG tablet            (G62.9) Peripheral polyneuropathy  Comment: on bilateral hands, 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

## 2018-06-20 NOTE — MR AVS SNAPSHOT
After Visit Summary   6/20/2018    Roselia العراقي    MRN: [id number]           Patient Information     Date Of Birth          1945        Visit Information        Provider Department      6/20/2018 3:15 PM David Turcios MD; [facility]        Today's Diagnoses     SNHL (sensory-neural hearing loss), asymmetrical    -  1    Wax in ear        Primary osteoarthritis of right knee        Peripheral polyneuropathy           Follow-ups after your visit        Additional Services     OTOLARYNGOLOGY REFERRAL       Your provider has referred you to: N: [facility] [place] [phone]   [email or web address]    Please be aware that coverage of these services is subject to the terms and limitations of your health insurance plan.  Call member services at your health plan with any benefit or coverage questions.      Please bring the following with you to your appointment:    (1) Any X-Rays, CTs or MRIs which have been performed.  Contact the facility where they were done to arrange for  prior to your scheduled appointment.   (2) List of current medications  (3) This referral request   (4) Any documents/labs given to you for this referral                  Follow-up notes from your care team     Return in about 6 months (around 12/20/2018) for Physical Exam, Lab Work, BP Recheck.      Who to contact     If you have questions or need follow up information about today's clinic visit or your schedule please contact [facility] directly at [phone].  Normal or non-critical lab and imaging results will be communicated to you by MyChart, letter or phone within 4 business days after the clinic has received the results. If you do not hear from us within 7 days, please contact the clinic through MyChart or phone. If you have a critical or abnormal lab result, we will notify you by phone as soon as possible.  Submit  "refill requests through [employer] or call your pharmacy and they will forward the refill request to us. Please allow 3 business days for your refill to be completed.          Additional Information About Your Visit        [employer] Information     [employer] gives you secure access to your electronic health record. If you see a primary care provider, you can also send messages to your care team and make appointments. If you have questions, please call your primary care clinic.  If you do not have a primary care provider, please call [phone] and they will assist you.        Care EveryWhere ID     This is your Care EveryWhere ID. This could be used by other organizations to access your [facility] medical records  [id number]        Your Vitals Were     Pulse Temperature Respirations Height Pulse Oximetry BMI (Body Mass Index)    85 96.7  F (35.9  C) (Tympanic) 12 5' 7\" (1.702 m) 94% 26.47 kg/m2       Blood Pressure from Last 3 Encounters:   06/20/18 112/68   04/17/18 127/75   01/16/18 134/85    Weight from Last 3 Encounters:   06/20/18 169 lb (76.7 kg)   04/17/18 169 lb (76.7 kg)   01/16/18 162 lb (73.5 kg)              We Performed the Following     OTOLARYNGOLOGY REFERRAL     REMOVE IMPACTED CERUMEN          Today's Medication Changes          These changes are accurate as of 6/20/18  4:02 PM.  If you have any questions, ask your nurse or doctor.               Start taking these medicines.        Dose/Directions    predniSONE 20 MG tablet   Commonly known as:  DELTASONE   Used for:  Primary osteoarthritis of right knee        Dose:  20 mg   Take 1 tablet (20 mg) by mouth 2 times daily   Quantity:  10 tablet   Refills:  0            Where to get your medicines      These medications were sent to [facility] [address] [facility] [employer] - [email or web address]  [address] [facility] [address]     Phone:  [phone]     predniSONE 20 MG tablet                Primary Care Provider Office Phone # Fax #    " David Turcios -[phone]       [address] [place] [address]        Equal Access to Services     URIEL DAVIS : Hadii aad ku hadfitomarvin Handy, hermelindoerinn evansatiyaha, qamellta kasamariada haim, jurgen savagein hayaarosa العليtalha sevilla clarisa eduardo. So [facility] [place] [phone].    ATENCIÓN: Si habla español, tiene a stapleton disposición servicios gratuitos de asistencia lingüística. Llame al [phone].    We comply with applicable federal civil rights laws and [place] laws. We do not discriminate on the basis of race, color, national origin, age, disability, sex, sexual orientation, or gender identity.            Thank you!     [facility] you [facility]  [facility] your [facility]. Our goal is always to provide you with excellent care. Hearing back from our patients is one way we can continue to improve our services. Please take a few minutes to complete the written survey that you may receive in the mail after your visit with us. Thank you!             Your Updated Medication List - Protect others around you: Learn how to safely use, store and throw away your medicines at www.disposemymeds.org.          This list is accurate as of 6/20/18  4:02 PM.  Always use your most recent med list.                   Brand Name Dispense Instructions for use Diagnosis    calcium carbonate 500 MG tablet    OS-[place] 500 mg [place]. Ca    100 tablet    Take 1 tablet by mouth daily Client taking it twice a day        fish oil-omega-3 fatty acids 1000 MG capsule     180 capsule    Take 2 capsules by mouth daily.        losartan 50 MG tablet    COZAAR    90 tablet    TAKE 1 TABLET (50 MG) BY MOUTH DAILY    Essential hypertension       MOTRIN IB PO           Multi-vitamin Tabs tablet      Take 1 tablet by mouth daily        nabumetone 500 MG tablet    RELAFEN    28 tablet    Take 1 tablet (500 mg) by mouth 2 times daily    Upper back pain, Acute pain of right knee       predniSONE 20 MG tablet    DELTASONE    10 tablet     Take 1 tablet (20 mg) by mouth 2 times daily    Primary osteoarthritis of right knee       propranolol 10 MG tablet    INDERAL    180 tablet    Take 1 tablet (10 mg) by mouth 2 times daily    Benign essential hypertension       simvastatin 20 MG tablet    ZOCOR    90 tablet    Take 1 tablet (20 mg) by mouth At Bedtime    Hyperlipidemia with target LDL less than 130       tiZANidine 2 MG tablet    ZANAFLEX    30 tablet    Take 1 tablet (2 mg) by mouth 2 times daily    Neck muscle spasm       triamcinolone 0.1 % cream    KENALOG    80 g    Apply sparingly to affected area two times daily for 14 days.    Eczema, unspecified type       triamcinolone 0.1 % paste    KENALOG    5 g    Take by mouth 2 times daily    Mouth sore, Oral aphthae       UNABLE TO FIND      MEDICATION NAME: PreserVision AREDS 2 formula soft gel. 1 soft gel twice daily

## 2018-06-25 NOTE — PROGRESS NOTES
[facility] Care Coordination Contact  Called [facility] to check on status of members insurance as member still shows inactive in [facility]. Called [facility] and they said that they have not received the sponsor info from out of town yet and they would need it by the end of the week in order to process it for retro back to June 1. Called daughter Emilee and she said that she just mailed it in to the [facility] last Wednesday. Discussed above and that the [facility] maybe has not gotten it yet. Emilee will check with the [facility] on Thursday to see if they got it and are processing it and if not, will go to a [facility] location in [facility] on Friday to drop it off.  Chanda Pollack RN, PHN, [facility]   [phone]

## 2018-07-02 NOTE — PROGRESS NOTES
[facility] Care Coordination Contact  Received an email from daughter Emilee on Friday: Chanda,  I took my dad go to [facility] office already. They said they need more information about my uncle and his wife s care home accounts and house values. So I need to contact my uncle.  Hope he can give me more documents. I never heard he need prove his house values. But anyway thanks Chanda, thanks for your help.  Emilee  Checked [employer] and member is inactive. Called [facility] and was told that they still got some sponsor asset info but still need more asset info on sponsor. Daughter is working on this.  Chanda Pollack RN, PHN, [facility]   [phone]

## 2018-07-07 DIAGNOSIS — I10 ESSENTIAL HYPERTENSION: ICD-10-CM

## 2018-07-09 NOTE — TELEPHONE ENCOUNTER
"Requested Prescriptions   Pending Prescriptions Disp Refills     losartan (COZAAR) 50 MG tablet [Pharmacy Med Name: LOSARTAN POTASSIUM 50 MG TAB]  Last Written Prescription Date:  4/17/18  Last Fill Quantity: 90,  # refills: 1   Last office visit: 6/20/2018 with prescribing provider:  Tam   Future Office Visit:     90 tablet 1     Sig: TAKE 1 TABLET (50 MG) BY MOUTH DAILY    Angiotensin-II Receptors Passed    7/7/2018  1:18 AM       Passed - Blood pressure under 140/90 in past 12 months    BP Readings from Last 3 Encounters:   06/20/18 112/68   04/17/18 127/75   01/16/18 134/85                Passed - Recent (12 mo) or future (30 days) visit within the authorizing provider's specialty    Patient had office visit in the last 12 months or has a visit in the next 30 days with authorizing provider or within the authorizing provider's specialty.  See \"Patient Info\" tab in inbasket, or \"Choose Columns\" in Meds & Orders section of the refill encounter.           Passed - Patient is age 18 or older       Passed - Normal serum creatinine on file in past 12 months    Recent Labs   Lab Test  01/16/18   1020   CR  0.81            Passed - Normal serum potassium on file in past 12 months    Recent Labs   Lab Test  01/16/18   1020   POTASSIUM  4.3                      "

## 2018-07-10 RX ORDER — LOSARTAN POTASSIUM 50 MG/1
TABLET ORAL
Qty: 90 TABLET | Refills: 1 | OUTPATIENT
Start: 2018-07-10

## 2018-07-10 NOTE — TELEPHONE ENCOUNTER
Duplicate- sent - info sent to pharmacy.  Brianna England,RN  [facility]  [phone]

## 2018-07-20 ENCOUNTER — PATIENT OUTREACH (OUTPATIENT)
Dept: GERIATRIC MEDICINE | Facility: CLINIC | Age: 73
End: 2018-07-20

## 2018-07-20 NOTE — PROGRESS NOTES
[facility] Care Coordination Contact  Conferred with RIANA carr when to send the DHS from [facility] to the [facility] as member has been off MA since 5/31/18 and not sure if he will get it again or not. Need to send to [facility] by day 60-by end of the month.    Called [facility] and they are sending member a special populations application for MA-they have no idea if member will qualify or not. Called [facility] and was told for member to try to get the sponsor asset info and they will go back 3 months. If member became a [place] citizen, he would not have to worry about getting sponsor info each year. Also member probably will not qualify for [place] and can apply for [place] online at any time-the sooner the better if member is not able to get the sponsor info. Another option is to write a letter to request an indigent exemption and send it to the county with his case number on it. In the letter, try to explain why the sponsor is not cooperating in sending the asset info as well as what services he is currently getting as well as what will happen if these services end-what the consequences of his not having these services will be. There is no guarantee that this will make him eligible for MA again but if it does, they will go back the 3 months.    Called daughter Emilee about this and also sent an email with this info.  Chanda Pollack RN, PHN, [facility]   [phone]

## 2018-07-20 NOTE — PROGRESS NOTES
[facility] Care Coordination Contact  Spoke with daughter Emilee who reports that her aunt and uncle are not getting along and her aunt does not want to send any further information anymore on their assets so member will be losing his insurance 9/1/18 as he has been off MA since 5/31/18.  Emilee states her mom is a citizen, worked and earned 40 credits so can get medicare. Emilee states her dad is not a citizen, he helped with  but his name was not on any paychecks so he did not earn 40 credits so will not be able to get medicare.   Emilee states that right now his dentist and eye doctors will not see member as he does not have insurance coverage but he has medical coverage with [employer] until 8/31/18. Emilee states she went to [facility] and also spoke with [facility] yesterday about her dad's situation. [facility] is sending member an application so member can be straight MA for Sept 1.   Discussed that member will no longer be able to go to [facility] and that Emilee will no longer get paid for homemaking or PCA 9/1/18. Emilee states she understands this. Emilee states that her dad will be working on becoming a citizen so is not dependent on getting asset information from sponsors but this may take some time. Emilee may check with her aunt and uncle to see if they would send the asset info one more time to give member a year to work on becoming a citizen.   Chanda Pollack RN, PHN, [facility]   [phone]

## 2018-07-23 NOTE — PROGRESS NOTES
[facility] Care Coordination Contact  Received an email from daughter Emilee:  Called Emilee back and discussed that she should contact the social security office to find out what this info means. Did also let her know that member is on an EW waiver through [employer], not medicare. Daughter is still trying to get the asset info from her aunt and uncle.   Hi Chanda,      My dad got letter for SSI, I will show you above. It s seems like they withhold my dad s monthly [employer] benefits $90.90 to pay for Medical insurance $133.60 until November 2018. We owned SSI $480.70. My Quesstion is: Is that means he can still got the Medicare Waiver until November 2018? Because we pay the insurance If we pay $133.60 every month, He still can go to Adult  and still under your management? I have little confused in understand $133.60 is pay for regular Medicare or Medicare Waiver. Like I said, He  will apply form to be [place] citizen later. If every moth pay $133.60, we can take it until he becomes the citizen. I don t want he loose his insurance. He needs take pills keep his blood pressure normal. Please help us, you have experience to connect with government department. I really don t know what difference between Medicare premium and Medicare Waiver and Medicare.  Thanks.    Chanda Pollack RN, PHN, [facility]   [phone]

## 2018-07-24 NOTE — PROGRESS NOTES
[facility] Care Coordination Contact  Spoke with Gertrudis Oconnor from [facility] as well as the billing person from [employer]/homemaking agency as well as daughter.  Gertrudis is going with member and daughter to [facility] tomorrow to see about putting off the ending of MA for a year and member can still go to [facility], even if they may not get paid. [employer] plans on putting services on hold for now as even though they got paid for June, the health plan may take the money back. So since this is not a case where the MA paperwork is late and the MA gets re instated, [facility]N wants to wait to provide further services until they find out for sure that member is going to be back on MA.  Chanda Pollack RN, PHN, [facility]   [phone]

## 2018-07-31 ENCOUNTER — PATIENT OUTREACH (OUTPATIENT)
Dept: GERIATRIC MEDICINE | Facility: CLINIC | Age: 73
End: 2018-07-31

## 2018-07-31 NOTE — PROGRESS NOTES
[facility] Care Coordination Contact  Member remains off his MA since 5/31/18-2 months now. DHS form 6037/UTF right faxed to [facility] per protocol.  Chanda Pollack RN, PHN, [facility]   [phone]

## 2018-09-17 ENCOUNTER — PATIENT OUTREACH (OUTPATIENT)
Dept: GERIATRIC MEDICINE | Facility: CLINIC | Age: 73
End: 2018-09-17

## 2018-09-17 DIAGNOSIS — Z53.9 DIAGNOSIS NOT YET DEFINED: Primary | ICD-10-CM

## 2018-09-17 PROCEDURE — G0179 MD RECERTIFICATION HHA PT: HCPCS | Performed by: FAMILY MEDICINE

## 2018-12-14 DIAGNOSIS — E78.5 HYPERLIPIDEMIA WITH TARGET LDL LESS THAN 130: ICD-10-CM

## 2018-12-14 RX ORDER — SIMVASTATIN 20 MG
20 TABLET ORAL AT BEDTIME
Qty: 90 TABLET | Refills: 0 | Status: SHIPPED | OUTPATIENT
Start: 2018-12-14 | End: 2019-03-21

## 2018-12-14 NOTE — TELEPHONE ENCOUNTER
"Requested Prescriptions   Pending Prescriptions Disp Refills     simvastatin (ZOCOR) 20 MG tablet 90 tablet 3    Last Written Prescription Date:  12/12/17  Last Fill Quantity: 90,  # refills: 3   Last office visit: 6/20/2018 with prescribing provider:  YES    Future Office Visit:     Sig: Take 1 tablet (20 mg) by mouth At Bedtime    Statins Protocol Passed - 12/14/2018  1:00 PM       Passed - LDL on file in past 12 months    Recent Labs   Lab Test 01/16/18  1020   LDL 45            Passed - No abnormal creatine kinase in past 12 months    No lab results found.            Passed - Recent (12 mo) or future (30 days) visit within the authorizing provider's specialty    Patient had office visit in the last 12 months or has a visit in the next 30 days with authorizing provider or within the authorizing provider's specialty.  See \"Patient Info\" tab in inbasket, or \"Choose Columns\" in Meds & Orders section of the refill encounter.             Passed - Patient is age 18 or older          "

## 2018-12-14 NOTE — TELEPHONE ENCOUNTER
Patient due for fasting office visit- 30 days supply given.  Routing to team to schedule appointment     Brianna England RN  [facility]  [phone]

## 2018-12-19 DIAGNOSIS — I10 ESSENTIAL HYPERTENSION: ICD-10-CM

## 2018-12-19 RX ORDER — LOSARTAN POTASSIUM 50 MG/1
50 TABLET ORAL DAILY
Qty: 30 TABLET | Refills: 0 | Status: SHIPPED | OUTPATIENT
Start: 2018-12-19 | End: 2019-03-04

## 2018-12-19 NOTE — TELEPHONE ENCOUNTER
"Requested Prescriptions   Pending Prescriptions Disp Refills     losartan (COZAAR) 50 MG tablet  Last Written Prescription Date:  11-  Last Fill Quantity: 90 tablet,  # refills: 0   Last office visit: 6/20/2018 with prescribing provider:     Future Office Visit:     90 tablet 0     Sig: Take 1 tablet (50 mg) by mouth daily    Angiotensin-II Receptors Passed - 12/19/2018 11:02 AM       Passed - Blood pressure under 140/90 in past 12 months    BP Readings from Last 3 Encounters:   06/20/18 112/68   04/17/18 127/75   01/16/18 134/85                Passed - Recent (12 mo) or future (30 days) visit within the authorizing provider's specialty    Patient had office visit in the last 12 months or has a visit in the next 30 days with authorizing provider or within the authorizing provider's specialty.  See \"Patient Info\" tab in inbasket, or \"Choose Columns\" in Meds & Orders section of the refill encounter.             Passed - Patient is age 18 or older       Passed - Normal serum creatinine on file in past 12 months    Recent Labs   Lab Test 01/16/18  1020   CR 0.81            Passed - Normal serum potassium on file in past 12 months    Recent Labs   Lab Test 01/16/18  1020   POTASSIUM 4.3                      "

## 2018-12-19 NOTE — TELEPHONE ENCOUNTER
Prescription approved per [facility] Refill Protocol. Gave patient a 30 day supply. Patient needs to be scheduled for an office visit.       Kaycee Chu RN

## 2019-03-04 DIAGNOSIS — I10 ESSENTIAL HYPERTENSION: ICD-10-CM

## 2019-03-04 RX ORDER — LOSARTAN POTASSIUM 50 MG/1
TABLET ORAL
Qty: 30 TABLET | Refills: 0 | Status: SHIPPED | OUTPATIENT
Start: 2019-03-04 | End: 2019-03-21

## 2019-03-04 NOTE — TELEPHONE ENCOUNTER
Routing refill request to provider for review/approval because:  Labs not current:      Brianna EnglandRN BSN  [facility]  [phone]

## 2019-03-04 NOTE — TELEPHONE ENCOUNTER
"Requested Prescriptions   Pending Prescriptions Disp Refills     losartan (COZAAR) 50 MG tablet [Pharmacy Med Name: LOSARTAN POTASSIUM 50 MG TAB] 30 tablet 0     Sig: TAKE 1 TABLET BY MOUTH EVERY DAY    Angiotensin-II Receptors Failed - 3/4/2019  1:08 AM       Failed - Normal serum creatinine on file in past 12 months    Recent Labs   Lab Test 01/16/18  1020   CR 0.81            Failed - Normal serum potassium on file in past 12 months    Recent Labs   Lab Test 01/16/18  1020   POTASSIUM 4.3                   Passed - Blood pressure under 140/90 in past 12 months    BP Readings from Last 3 Encounters:   06/20/18 112/68   04/17/18 127/75   01/16/18 134/85                Passed - Recent (12 mo) or future (30 days) visit within the authorizing provider's specialty    Patient had office visit in the last 12 months or has a visit in the next 30 days with authorizing provider or within the authorizing provider's specialty.  See \"Patient Info\" tab in inbasket, or \"Choose Columns\" in Meds & Orders section of the refill encounter.             Passed - Medication is active on med list       Passed - Patient is age 18 or older        losartan (COZAAR) 50 MG tablet 30 tablet 0 12/19/2018       Last Written Prescription Date:  12/19/2018  Last Fill Quantity: 30,  # refills: 0   Last office visit: 6/20/2018 with prescribing provider:  Dr. Turcios   Future Office Visit:  Unknown     "

## 2019-03-12 DIAGNOSIS — E78.5 HYPERLIPIDEMIA WITH TARGET LDL LESS THAN 130: ICD-10-CM

## 2019-03-12 NOTE — TELEPHONE ENCOUNTER
"Requested Prescriptions   Pending Prescriptions Disp Refills     simvastatin (ZOCOR) 20 MG tablet [Pharmacy Med Name: SIMVASTATIN 20 MG TABLET]  Last Written Prescription Date:  12/14/18  Last Fill Quantity: 90,  # refills: 0   Last office visit: 6/20/2018 with prescribing provider:  Tam   Future Office Visit:     90 tablet 0     Sig: TAKE 1 TABLET (20 MG) BY MOUTH AT BEDTIME    Statins Protocol Failed - 3/12/2019  1:31 AM       Failed - LDL on file in past 12 months    Recent Labs   Lab Test 01/16/18  1020   LDL 45            Passed - No abnormal creatine kinase in past 12 months    No lab results found.            Passed - Recent (12 mo) or future (30 days) visit within the authorizing provider's specialty    Patient had office visit in the last 12 months or has a visit in the next 30 days with authorizing provider or within the authorizing provider's specialty.  See \"Patient Info\" tab in inbasket, or \"Choose Columns\" in Meds & Orders section of the refill encounter.             Passed - Medication is active on med list       Passed - Patient is age 18 or older          "

## 2019-03-12 NOTE — TELEPHONE ENCOUNTER
Routing refill request to provider for review/approval because:  Labs not current:  LDL.    Kaycee SANCHEZN, RN   [facility]

## 2019-03-12 NOTE — TELEPHONE ENCOUNTER
Need f/u and fasting lab with me  Please help him to schedule with me and coming with fasting  thx

## 2019-03-12 NOTE — TELEPHONE ENCOUNTER
Routing to team 3 to help schedule patient per note below from Dr. Turcios.     Kaycee SANCHEZN, RN   [facility]

## 2019-03-13 NOTE — TELEPHONE ENCOUNTER
Called patient with interp and left a non detailed vm to call clinic.      .Chelsea FLETCHER    [facility] [place]

## 2019-03-19 RX ORDER — SIMVASTATIN 20 MG
20 TABLET ORAL AT BEDTIME
Qty: 90 TABLET | Refills: 0 | OUTPATIENT
Start: 2019-03-19

## 2019-03-21 ENCOUNTER — OFFICE VISIT (OUTPATIENT)
Dept: FAMILY MEDICINE | Facility: CLINIC | Age: 74
End: 2019-03-21
Payer: COMMERCIAL

## 2019-03-21 VITALS
DIASTOLIC BLOOD PRESSURE: 88 MMHG | WEIGHT: 167 LBS | TEMPERATURE: 97.8 F | BODY MASS INDEX: 26.21 KG/M2 | SYSTOLIC BLOOD PRESSURE: 136 MMHG | RESPIRATION RATE: 12 BRPM | HEIGHT: 67 IN | OXYGEN SATURATION: 98 % | HEART RATE: 68 BPM

## 2019-03-21 DIAGNOSIS — Z23 NEED FOR VACCINATION: ICD-10-CM

## 2019-03-21 DIAGNOSIS — E78.5 HYPERLIPIDEMIA WITH TARGET LDL LESS THAN 130: ICD-10-CM

## 2019-03-21 DIAGNOSIS — I10 ESSENTIAL HYPERTENSION: ICD-10-CM

## 2019-03-21 DIAGNOSIS — E78.5 HYPERLIPIDEMIA LDL GOAL <130: Primary | ICD-10-CM

## 2019-03-21 LAB
ERYTHROCYTE [DISTWIDTH] IN BLOOD BY AUTOMATED COUNT: 14.8 % (ref 10–15)
HCT VFR BLD AUTO: 50.8 % (ref 40–53)
HGB BLD-MCNC: 16.4 G/DL (ref 13.3–17.7)
MCH RBC QN AUTO: 28.9 PG (ref 26.5–33)
MCHC RBC AUTO-ENTMCNC: 32.3 G/DL (ref 31.5–36.5)
MCV RBC AUTO: 89 FL (ref 78–100)
PLATELET # BLD AUTO: 193 10E9/L (ref 150–450)
RBC # BLD AUTO: 5.68 10E12/L (ref 4.4–5.9)
WBC # BLD AUTO: 5.5 10E9/L (ref 4–11)

## 2019-03-21 PROCEDURE — 85027 COMPLETE CBC AUTOMATED: CPT | Performed by: FAMILY MEDICINE

## 2019-03-21 PROCEDURE — 80061 LIPID PANEL: CPT | Performed by: FAMILY MEDICINE

## 2019-03-21 PROCEDURE — 90471 IMMUNIZATION ADMIN: CPT | Performed by: FAMILY MEDICINE

## 2019-03-21 PROCEDURE — 36415 COLL VENOUS BLD VENIPUNCTURE: CPT | Performed by: FAMILY MEDICINE

## 2019-03-21 PROCEDURE — 80053 COMPREHEN METABOLIC PANEL: CPT | Performed by: FAMILY MEDICINE

## 2019-03-21 PROCEDURE — 90715 TDAP VACCINE 7 YRS/> IM: CPT | Performed by: FAMILY MEDICINE

## 2019-03-21 PROCEDURE — 99214 OFFICE O/P EST MOD 30 MIN: CPT | Mod: 25 | Performed by: FAMILY MEDICINE

## 2019-03-21 RX ORDER — PREDNISONE 20 MG/1
20 TABLET ORAL 2 TIMES DAILY
Refills: 0 | COMMUNITY
Start: 2018-06-20 | End: 2019-09-24

## 2019-03-21 RX ORDER — LOSARTAN POTASSIUM 50 MG/1
50 TABLET ORAL DAILY
Qty: 90 TABLET | Refills: 1 | Status: SHIPPED | OUTPATIENT
Start: 2019-03-21 | End: 2019-09-24

## 2019-03-21 RX ORDER — SIMVASTATIN 20 MG
20 TABLET ORAL AT BEDTIME
Qty: 90 TABLET | Refills: 1 | Status: SHIPPED | OUTPATIENT
Start: 2019-03-21 | End: 2019-09-13

## 2019-03-21 ASSESSMENT — MIFFLIN-ST. JEOR: SCORE: 1451.14

## 2019-03-21 NOTE — PROGRESS NOTES
SUBJECTIVE:   Roselia العراقي is a 74 year old male who presents to clinic today for the following health issues:      Hyperlipidemia Follow-Up      Rate your low fat/cholesterol diet?: good    Taking statin?  Yes, no muscle aches from statin    Other lipid medications/supplements?:  none      Amount of exercise or physical activity: 2-3 days/week for an average of greater than 30 minutes    Problems taking medications regularly: No    Medication side effects: none    Diet: regular (no restrictions)      Problem list and histories reviewed & adjusted, as indicated.  Additional history: as documented    Patient Active Problem List   Diagnosis     Smoking     Dyspepsia     Varicose veins     Hyperlipidemia LDL goal <130     H. pylori infection     Vertigo, benign positional     Essential hypertension     Hyperlipidemia with target LDL less than 130     Advanced directives, counseling/discussion     Left knee pain     Benign neoplasm of colon     No past surgical history on file.    Social History     Tobacco Use     Smoking status: Former Smoker     Packs/day: 0.30     Years: 20.00     Pack years: 6.00     Types: Cigarettes     Smokeless tobacco: Never Used   Substance Use Topics     Alcohol use: No     Alcohol/week: 0.0 oz     Family History   Problem Relation Age of Onset     Hypertension Father      Hypertension Brother      Cerebrovascular Disease Father          Current Outpatient Medications   Medication Sig Dispense Refill     fish oil-omega-3 fatty acids 1000 MG capsule Take 2 capsules by mouth daily. 180 capsule 12     losartan (COZAAR) 50 MG tablet Take 1 tablet (50 mg) by mouth daily 90 tablet 1     simvastatin (ZOCOR) 20 MG tablet Take 1 tablet (20 mg) by mouth At Bedtime 90 tablet 1     UNABLE TO FIND MEDICATION NAME: PreserVision AREDS 2 formula soft gel. 1 soft gel twice daily       calcium carbonate 500 MG tablet Take 1 tablet by mouth daily Client taking it twice a day 100 tablet 3     MOTRIN IB PO         multivitamin, therapeutic with minerals (MULTI-VITAMIN) TABS Take 1 tablet by mouth daily       nabumetone (RELAFEN) 500 MG tablet Take 1 tablet (500 mg) by mouth 2 times daily (Patient not taking: Reported on 3/21/2019) 28 tablet 1     predniSONE (DELTASONE) 20 MG tablet Take 20 mg by mouth 2 times daily.  0     propranolol (INDERAL) 10 MG tablet Take 1 tablet (10 mg) by mouth 2 times daily (Patient not taking: Reported on 3/21/2019) 180 tablet 3     tiZANidine (ZANAFLEX) 2 MG tablet Take 1 tablet (2 mg) by mouth 2 times daily (Patient not taking: Reported on 6/20/2018) 30 tablet 1     triamcinolone (KENALOG) 0.1 % cream Apply sparingly to affected area two times daily for 14 days. (Patient not taking: Reported on 3/21/2019) 80 g 5     triamcinolone (KENALOG) 0.1 % paste Take by mouth 2 times daily (Patient not taking: Reported on 3/21/2019) 5 g 0     Allergies   Allergen Reactions     Penicillins      Sulfa Drugs Hives     Sulphasomidine      Tetracycline      Recent Labs   Lab Test 01/16/18  1020 07/11/17  1021 12/20/16  1011 12/15/15  1109  10/03/13  1110  08/09/12  1446   LDL 45  --  59 48   < >  --    < >  --    HDL 44  --  36* 40   < >  --    < >  --    TRIG 221*  --  239* 252*   < >  --    < >  --    ALT 23 30 21 37   < > 74*   < > 66   CR 0.81 0.88  --  0.90   < > 1.05   < > 0.99   GFRESTIMATED >90 85  --  83   < > 70   < > 75   GFRESTBLACK >90 >90  African American GFR Calc    --  >90   GFR Calc     < > 85   < > >90   POTASSIUM 4.3 4.7  --  4.3   < > 3.6   < > 4.3   TSH  --   --   --   --   --  2.04  --  1.82    < > = values in this interval not displayed.      BP Readings from Last 3 Encounters:   03/21/19 136/88   06/20/18 112/68   04/17/18 127/75    Wt Readings from Last 3 Encounters:   03/21/19 75.8 kg (167 lb)   06/20/18 76.7 kg (169 lb)   04/17/18 76.7 kg (169 lb)                    Reviewed and updated as needed this visit by clinical staff       Reviewed and updated as needed  "this visit by Provider         ROS:  Constitutional, HEENT, cardiovascular, pulmonary, gi and gu systems are negative, except as otherwise noted.    OBJECTIVE:     /88 (Cuff Size: Adult Large)   Pulse 68   Temp 97.8  F (36.6  C) (Tympanic)   Resp 12   Ht 1.702 m (5' 7\")   Wt 75.8 kg (167 lb)   SpO2 98%   BMI 26.16 kg/m    Body mass index is 26.16 kg/m .  GENERAL: healthy, alert and no distress  EYES: Eyes grossly normal to inspection, PERRL and conjunctivae and sclerae normal  HENT: ear canals and TM's normal, nose and mouth without ulcers or lesions  NECK: no adenopathy, no asymmetry, masses, or scars and thyroid normal to palpation  RESP: lungs clear to auscultation - no rales, rhonchi or wheezes  CV: regular rate and rhythm, normal S1 S2, no S3 or S4, no murmur, click or rub, no peripheral edema and peripheral pulses strong  ABDOMEN: soft, nontender, no hepatosplenomegaly, no masses and bowel sounds normal  MS: no gross musculoskeletal defects noted, no edema  SKIN: no suspicious lesions or rashes  NEURO: Normal strength and tone, mentation intact and speech normal  BACK: no CVA tenderness, no paralumbar tenderness  PSYCH: mentation appears normal, affect normal/bright  LYMPH: no cervical, supraclavicular, axillary, or inguinal adenopathy        ASSESSMENT/PLAN:   ASSESSMENT / PLAN:  (E78.5) Hyperlipidemia LDL goal <130  (primary encounter diagnosis)  Comment: stable, has no CP/SOB/palpitation   Will keep watching sx   Plan: CBC with platelets, Comprehensive metabolic         panel, Lipid panel reflex to direct LDL Fasting            (I10) Essential hypertension  Comment: stable,   Plan: CBC with platelets, Comprehensive metabolic         panel, Lipid panel reflex to direct LDL         Fasting, losartan (COZAAR) 50 MG tablet            (E78.5) Hyperlipidemia with target LDL less than 130  Comment: mentioned above   Plan: simvastatin (ZOCOR) 20 MG tablet              FUTURE APPOINTMENTS:       - " Follow-up visit in 6 months     David Turcios MD  [facility] [place]

## 2019-03-22 LAB
ALBUMIN SERPL-MCNC: 4.2 G/DL (ref 3.4–5)
ALP SERPL-CCNC: 67 U/L (ref 40–150)
ALT SERPL W P-5'-P-CCNC: 37 U/L (ref 0–70)
ANION GAP SERPL CALCULATED.3IONS-SCNC: 6 MMOL/L (ref 3–14)
AST SERPL W P-5'-P-CCNC: 27 U/L (ref 0–45)
BILIRUB SERPL-MCNC: 0.5 MG/DL (ref 0.2–1.3)
BUN SERPL-MCNC: 21 MG/DL (ref 7–30)
CALCIUM SERPL-MCNC: 8.7 MG/DL (ref 8.5–10.1)
CHLORIDE SERPL-SCNC: 106 MMOL/L (ref 94–109)
CHOLEST SERPL-MCNC: 202 MG/DL
CO2 SERPL-SCNC: 26 MMOL/L (ref 20–32)
CREAT SERPL-MCNC: 0.89 MG/DL (ref 0.66–1.25)
GFR SERPL CREATININE-BSD FRML MDRD: 84 ML/MIN/{1.73_M2}
GLUCOSE SERPL-MCNC: 94 MG/DL (ref 70–99)
HDLC SERPL-MCNC: 28 MG/DL
LDLC SERPL CALC-MCNC: 98 MG/DL
NONHDLC SERPL-MCNC: 174 MG/DL
POTASSIUM SERPL-SCNC: 4.2 MMOL/L (ref 3.4–5.3)
PROT SERPL-MCNC: 8.1 G/DL (ref 6.8–8.8)
SODIUM SERPL-SCNC: 138 MMOL/L (ref 133–144)
TRIGL SERPL-MCNC: 381 MG/DL

## 2019-09-13 DIAGNOSIS — E78.5 HYPERLIPIDEMIA WITH TARGET LDL LESS THAN 130: ICD-10-CM

## 2019-09-13 RX ORDER — SIMVASTATIN 20 MG
20 TABLET ORAL AT BEDTIME
Qty: 90 TABLET | Refills: 1 | Status: SHIPPED | OUTPATIENT
Start: 2019-09-13 | End: 2020-02-25

## 2019-09-13 NOTE — TELEPHONE ENCOUNTER
"Requested Prescriptions   Pending Prescriptions Disp Refills     simvastatin (ZOCOR) 20 MG tablet [Pharmacy Med Name: SIMVASTATIN 20 MG  Last Written Prescription Date:  3-  Last Fill Quantity: 90 tablet,  # refills: 1   Last office visit: 3/21/2019 with prescribing provider:     Future Office Visit:     TABLET] 90 tablet 1     Sig: TAKE 1 TABLET (20 MG) BY MOUTH AT BEDTIME       Statins Protocol Passed - 9/13/2019  1:42 AM        Passed - LDL on file in past 12 months     Recent Labs   Lab Test 03/21/19  1051   LDL 98             Passed - No abnormal creatine kinase in past 12 months     No lab results found.             Passed - Recent (12 mo) or future (30 days) visit within the authorizing provider's specialty     Patient had office visit in the last 12 months or has a visit in the next 30 days with authorizing provider or within the authorizing provider's specialty.  See \"Patient Info\" tab in inbasket, or \"Choose Columns\" in Meds & Orders section of the refill encounter.              Passed - Medication is active on med list        Passed - Patient is age 18 or older          "

## 2019-09-13 NOTE — TELEPHONE ENCOUNTER
Prescription approved per [facility] Refill Protocol.    Chloé Perera RN, BSN  [facility]

## 2019-09-23 ENCOUNTER — TELEPHONE (OUTPATIENT)
Dept: FAMILY MEDICINE | Facility: CLINIC | Age: 74
End: 2019-09-23
Payer: COMMERCIAL

## 2019-09-23 PROCEDURE — 99207 C PAF COMPLETED  NO CHARGE: CPT | Performed by: FAMILY MEDICINE

## 2019-09-23 NOTE — TELEPHONE ENCOUNTER
OPTUM PAF Project printed and given to PCP or MA for completion at patient's on 09/24/2019 appointment  Routing to Provider to sign EPIC order (pended in this encounter) once appointment is complete  TC to fax to [phone] to process once it has been signed    .Chelsea FLETCHER    [facility]

## 2019-09-24 ENCOUNTER — OFFICE VISIT (OUTPATIENT)
Dept: FAMILY MEDICINE | Facility: CLINIC | Age: 74
End: 2019-09-24
Payer: COMMERCIAL

## 2019-09-24 VITALS
BODY MASS INDEX: 26.21 KG/M2 | OXYGEN SATURATION: 98 % | WEIGHT: 167 LBS | HEART RATE: 74 BPM | DIASTOLIC BLOOD PRESSURE: 72 MMHG | RESPIRATION RATE: 18 BRPM | SYSTOLIC BLOOD PRESSURE: 130 MMHG | HEIGHT: 67 IN | TEMPERATURE: 96.4 F

## 2019-09-24 DIAGNOSIS — Z00.00 ENCOUNTER FOR MEDICARE ANNUAL WELLNESS EXAM: Primary | ICD-10-CM

## 2019-09-24 DIAGNOSIS — I10 ESSENTIAL HYPERTENSION: ICD-10-CM

## 2019-09-24 DIAGNOSIS — Z12.5 SCREENING FOR PROSTATE CANCER: ICD-10-CM

## 2019-09-24 LAB
ALBUMIN SERPL-MCNC: 4.2 G/DL (ref 3.4–5)
ALP SERPL-CCNC: 63 U/L (ref 40–150)
ALT SERPL W P-5'-P-CCNC: 32 U/L (ref 0–70)
ANION GAP SERPL CALCULATED.3IONS-SCNC: 5 MMOL/L (ref 3–14)
AST SERPL W P-5'-P-CCNC: 23 U/L (ref 0–45)
BILIRUB SERPL-MCNC: 0.5 MG/DL (ref 0.2–1.3)
BUN SERPL-MCNC: 16 MG/DL (ref 7–30)
CALCIUM SERPL-MCNC: 8.9 MG/DL (ref 8.5–10.1)
CHLORIDE SERPL-SCNC: 105 MMOL/L (ref 94–109)
CHOLEST SERPL-MCNC: 140 MG/DL
CO2 SERPL-SCNC: 26 MMOL/L (ref 20–32)
CREAT SERPL-MCNC: 0.86 MG/DL (ref 0.66–1.25)
GFR SERPL CREATININE-BSD FRML MDRD: 85 ML/MIN/{1.73_M2}
GLUCOSE SERPL-MCNC: 95 MG/DL (ref 70–99)
HDLC SERPL-MCNC: 37 MG/DL
HGB BLD-MCNC: 16.9 G/DL (ref 13.3–17.7)
LDLC SERPL CALC-MCNC: 58 MG/DL
NONHDLC SERPL-MCNC: 103 MG/DL
POTASSIUM SERPL-SCNC: 4.3 MMOL/L (ref 3.4–5.3)
PROT SERPL-MCNC: 7.8 G/DL (ref 6.8–8.8)
PSA SERPL-ACNC: 2.47 UG/L (ref 0–4)
SODIUM SERPL-SCNC: 136 MMOL/L (ref 133–144)
TRIGL SERPL-MCNC: 223 MG/DL

## 2019-09-24 PROCEDURE — 85018 HEMOGLOBIN: CPT | Performed by: FAMILY MEDICINE

## 2019-09-24 PROCEDURE — 99397 PER PM REEVAL EST PAT 65+ YR: CPT | Performed by: FAMILY MEDICINE

## 2019-09-24 PROCEDURE — 36415 COLL VENOUS BLD VENIPUNCTURE: CPT | Performed by: FAMILY MEDICINE

## 2019-09-24 PROCEDURE — 80061 LIPID PANEL: CPT | Performed by: FAMILY MEDICINE

## 2019-09-24 PROCEDURE — 80053 COMPREHEN METABOLIC PANEL: CPT | Performed by: FAMILY MEDICINE

## 2019-09-24 PROCEDURE — G0103 PSA SCREENING: HCPCS | Performed by: FAMILY MEDICINE

## 2019-09-24 RX ORDER — LOSARTAN POTASSIUM 50 MG/1
50 TABLET ORAL DAILY
Qty: 90 TABLET | Refills: 1 | Status: SHIPPED | OUTPATIENT
Start: 2019-09-24 | End: 2020-02-25

## 2019-09-24 ASSESSMENT — MIFFLIN-ST. JEOR: SCORE: 1451.14

## 2019-09-24 NOTE — PATIENT INSTRUCTIONS
Patient Education   Personalized Prevention Plan  You are due for the preventive services outlined below.  Your care team is available to assist you in scheduling these services.  If you have already completed any of these items, please share that information with your care team to update in your medical record.  Health Maintenance Due   Topic Date Due     AORTIC ANEURYSM SCREENING (SYSTEM ASSIGNED)  01/09/2009     Annual Wellness Visit  01/16/2019     FALL RISK ASSESSMENT  03/22/2019     Flu Vaccine (1) 09/01/2019     Zoster (Shingles) Vaccine (3 of 3) 06/27/2019

## 2019-09-24 NOTE — PROGRESS NOTES
"  SUBJECTIVE:   Roselia العراقي is a 74 year old male who presents for Preventive Visit.      Are you in the first 12 months of your Medicare Part B coverage?  No    Physical Health:    In general, how would you rate your overall physical health? fair    Outside of work, how many days during the week do you exercise? 2-3 days/week    Outside of work, approximately how many minutes a day do you exercise?15-30 minutes    If you drink alcohol do you typically have >3 drinks per day or >7 drinks per week? No    Do you usually eat at least 4 servings of fruit and vegetables a day, include whole grains & fiber and avoid regularly eating high fat or \"junk\" foods? Yes    Do you have any problems taking medications regularly?  No    Do you have any side effects from medications? none    Needs assistance for the following daily activities: no assistance needed    Which of the following safety concerns are present in your home?  none identified     Hearing impairment: Yes, Feel that people are mumbling or not speaking clearly.    In the past 6 months, have you been bothered by leaking of urine? no    Mental Health:    In general, how would you rate your overall mental or emotional health? good  PHQ-2 Score:      Do you feel safe in your environment? Yes    Do you have a Health Care Directive? No: Advance care planning was reviewed with patient; patient declined at this time.    Additional concerns to address?  No    Fall risk:  Fallen 2 or more times in the past year?: No  Any fall with injury in the past year?: No    Cognitive Screenin) Repeat 3 items (Leader, Season, Table)    2) Clock draw: NORMAL  3) 3 item recall: Recalls 3 objects  Results: 3 items recalled: COGNITIVE IMPAIRMENT LESS LIKELY    Mini-CogTM Copyright RIANA Root. Licensed by the author for use in [facility]; reprinted with permission (shane@.[facility]). All rights reserved.      Do you have sleep apnea, excessive snoring or daytime drowsiness?: " no      Reviewed and updated as needed this visit by clinical staff  Allergies         Reviewed and updated as needed this visit by Provider        Social History     Tobacco Use     Smoking status: Former Smoker     Packs/day: 0.30     Years: 20.00     Pack years: 6.00     Types: Cigarettes     Smokeless tobacco: Never Used   Substance Use Topics     Alcohol use: No     Alcohol/week: 0.0 standard drinks                           Current providers sharing in care for this patient include:   Patient Care Team:  David Turcios MD as PCP - General (Family Practice)  David Turcios MD as Assigned PCP    The following health maintenance items are reviewed in Epic and correct as of today:  Health Maintenance   Topic Date Due     AORTIC ANEURYSM SCREENING (SYSTEM ASSIGNED)  01/09/2009     MEDICARE ANNUAL WELLNESS VISIT  01/16/2019     FALL RISK ASSESSMENT  03/22/2019     INFLUENZA VACCINE (1) 09/01/2019     ZOSTER IMMUNIZATION (3 of 3) 06/27/2019     COLONOSCOPY  11/01/2021     ADVANCE CARE PLANNING  04/17/2022     LIPID  03/21/2024     DTAP/TDAP/TD IMMUNIZATION (3 - Td) 03/21/2029     PHQ-2  Completed     PNEUMOCOCCAL IMMUNIZATION 65+ LOW/MEDIUM RISK  Completed     IPV IMMUNIZATION  Aged Out     MENINGITIS IMMUNIZATION  Aged Out     BP Readings from Last 3 Encounters:   09/24/19 130/72   03/21/19 136/88   06/20/18 112/68    Wt Readings from Last 3 Encounters:   09/24/19 75.8 kg (167 lb)   03/21/19 75.8 kg (167 lb)   06/20/18 76.7 kg (169 lb)                  Patient Active Problem List   Diagnosis     Smoking     Dyspepsia     Varicose veins     Hyperlipidemia LDL goal <130     H. pylori infection     Vertigo, benign positional     Essential hypertension     Hyperlipidemia with target LDL less than 130     Advanced directives, counseling/discussion     Left knee pain     Benign neoplasm of colon     No past surgical history on file.    Social History     Tobacco Use     Smoking status: Former Smoker     Packs/day: 0.30      Years: 20.00     Pack years: 6.00     Types: Cigarettes     Smokeless tobacco: Never Used   Substance Use Topics     Alcohol use: No     Alcohol/week: 0.0 standard drinks     Family History   Problem Relation Age of Onset     Hypertension Father      Hypertension Brother      Cerebrovascular Disease Father          Current Outpatient Medications   Medication Sig Dispense Refill     fish oil-omega-3 fatty acids 1000 MG capsule Take 2 capsules by mouth daily. 180 capsule 12     losartan (COZAAR) 50 MG tablet Take 1 tablet (50 mg) by mouth daily 90 tablet 1     simvastatin (ZOCOR) 20 MG tablet TAKE 1 TABLET (20 MG) BY MOUTH AT BEDTIME 90 tablet 1     UNABLE TO FIND MEDICATION NAME: PreserVision AREDS 2 formula soft gel. 1 soft gel twice daily       calcium carbonate 500 MG tablet Take 1 tablet by mouth daily Client taking it twice a day 100 tablet 3     Allergies   Allergen Reactions     Penicillins      Sulfa Drugs Hives     Sulphasomidine      Tetracycline      Recent Labs   Lab Test 03/21/19  1051 01/16/18  1020 07/11/17  1021 12/20/16  1011  10/03/13  1110  08/09/12  1446   LDL 98 45  --  59   < >  --    < >  --    HDL 28* 44  --  36*   < >  --    < >  --    TRIG 381* 221*  --  239*   < >  --    < >  --    ALT 37 23 30 21   < > 74*   < > 66   CR 0.89 0.81 0.88  --    < > 1.05   < > 0.99   GFRESTIMATED 84 >90 85  --    < > 70   < > 75   GFRESTBLACK >90 >90 >90  African American GFR Calc    --    < > 85   < > >90   POTASSIUM 4.2 4.3 4.7  --    < > 3.6   < > 4.3   TSH  --   --   --   --   --  2.04  --  1.82    < > = values in this interval not displayed.      Pneumonia Vaccine:Adults age 65+ who received Pneumovax (PPSV23) at 65 years or older: Should be given PCV13 > 1 year after their most recent PPSV23    ROS:  Constitutional, HEENT, cardiovascular, pulmonary, gi and gu systems are negative, except as otherwise noted.    OBJECTIVE:   /72 (Cuff Size: Adult Large)   Pulse 74   Temp 96.4  F (35.8  C)  "(Tympanic)   Resp 18   Ht 1.702 m (5' 7\")   Wt 75.8 kg (167 lb)   SpO2 98%   BMI 26.16 kg/m   Estimated body mass index is 26.16 kg/m  as calculated from the following:    Height as of 3/21/19: 1.702 m (5' 7\").    Weight as of 3/21/19: 75.8 kg (167 lb).  EXAM:   GENERAL: healthy, alert and no distress  EYES: Eyes grossly normal to inspection, PERRL and conjunctivae and sclerae normal  HENT: ear canals and TM's normal, nose and mouth without ulcers or lesions  NECK: no adenopathy, no asymmetry, masses, or scars and thyroid normal to palpation  RESP: lungs clear to auscultation - no rales, rhonchi or wheezes  CV: regular rate and rhythm, normal S1 S2, no S3 or S4, no murmur, click or rub, no peripheral edema and peripheral pulses strong  ABDOMEN: soft, nontender, no hepatosplenomegaly, no masses and bowel sounds normal  MS: no gross musculoskeletal defects noted, no edema  SKIN: no suspicious lesions or rashes  NEURO: Normal strength and tone, mentation intact and speech normal  BACK: no CVA tenderness, no paralumbar tenderness  PSYCH: mentation appears normal, affect normal/bright  LYMPH: no cervical, supraclavicular, axillary, or inguinal adenopathy        ASSESSMENT / PLAN:       ICD-10-CM    1. Encounter for Medicare annual wellness exam Z00.00 Hemoglobin     Comprehensive metabolic panel     Lipid panel reflex to direct LDL Fasting     PSA, screen   2. Screening for prostate cancer Z12.5 PSA, screen   3. Essential hypertension I10 losartan (COZAAR) 50 MG tablet       End of Life Planning:  Patient currently has an advanced directive: Yes.  Practitioner is supportive of decision.    COUNSELING:  Reviewed preventive health counseling, as reflected in patient instructions    Estimated body mass index is 26.16 kg/m  as calculated from the following:    Height as of 3/21/19: 1.702 m (5' 7\").    Weight as of 3/21/19: 75.8 kg (167 lb).         reports that he has quit smoking. His smoking use included cigarettes. " He has a 6.00 pack-year smoking history. He has never used smokeless tobacco.      Appropriate preventive services were discussed with this patient, including applicable screening as appropriate for cardiovascular disease, diabetes, osteopenia/osteoporosis, and glaucoma.  As appropriate for age/gender, discussed screening for colorectal cancer, prostate cancer, breast cancer, and cervical cancer. Checklist reviewing preventive services available has been given to the patient.    Reviewed patients plan of care and provided an AVS. The Basic Care Plan (routine screening as documented in Health Maintenance) for [place] meets the Care Plan requirement. This Care Plan has been established and reviewed with the Patient.    Counseling Resources:  ATP IV Guidelines  Pooled Cohorts Equation Calculator  Breast Cancer Risk Calculator  FRAX Risk Assessment  ICSI Preventive Guidelines  Dietary Guidelines for Americans, 2010  USDA's MyPlate  ASA Prophylaxis  Lung CA Screening    David Turcios MD  [facility]

## 2019-10-01 ENCOUNTER — PATIENT OUTREACH (OUTPATIENT)
Dept: GERIATRIC MEDICINE | Facility: CLINIC | Age: 74
End: 2019-10-01

## 2019-10-01 NOTE — PROGRESS NOTES
[facility] Care Coordination Contact    Member became effective with [facility] on 10/1/2019 with [employer].  Previous Health Plan: MA/Fee For Service  Previous Care System: [place] Transfer  Previous care coordinators name and number: Chanda Pollack RN  Waiver Type: N/A  Last MMIS Entry: Date 5/31/18 and Type Assmt  MMIS visit date if different from above: NA  Services Listed in MMIS:   03 F MA TRANS 06 F HOMEMAKER 07 I MONEY MGT  08 I MED APPTS 18 F PERSONAL 20 F DAY SVC  35 F CASE MGMT 47 F WVRAC CONRAD 65 F PCA SUPER  68 F BLIND SVC  Member currently receiving the following home care services:     Member currently receiving the following community resources:    UTF received: No: Requested on No UTF to request as Chanda was the last CC     Reyna Louis  Care Management Specialist  [facility]  [phone]

## 2019-10-01 NOTE — LETTER
October 1, 2019    Important [facility] Information    MADY NICHOLE  [address]   [place] [address]  A Partner in Your Care  Dear Mady,  Thank you for choosing [employer] for your health plan coverage! I am excited to welcome you as a member of [employer] .  My name is Chanda Pollack RN, and I will be working with you as your Care Coordinator. [employer] partners with [employer] to provide members with Care Coordination services.  As your Care Coordinator, I can:    Work with you to create a Care Plan to keep you healthy and safe    Help you make appointments to see health care providers     Support you and your family in making health care decisions    Find community services that may interest you    Identify health benefits you are eligible for  What happens next?  To get started, I will call you. I ll ask you a few questions about your health and schedule a time to meet. You will have a chance to ask me questions, too.  Questions?  Call me at [phone] Monday-Friday between 8am and 5pm. TTY/TDD: 711. I look forward to speaking with you soon.  Sincerely,    Chanda Polalck RN    E-mail: [email or web address]  Phone: [phone]      [employer]          cc: member records                      Civil Rights Notice  Discrimination is against the law. [employer] does not discriminate on the basis of any of the following:    Race    Color    National Origin    Creed    Anabaptist    Age    Public Assistance Status    Receipt of Health Care Services    Disability (including physical or mental impairment)    Sex (including sex stereotypes and gender identity)    Marital Status    Political Beliefs    Medical Condition    Genetic Information    Sexual Orientation    Claims Experience    Medical History    Health Status    Auxiliary Aids and Services:  [employer] provides auxiliary aids and services, like qualified interpreters or information in accessible formats, free of charge and in a  timely manner, to ensure an equal opportunity to participate in our health care programs. Contact [employer] Customer Service at [employer]/contact medicaid or call [phone] (toll free); TTY:715 or at [email or web address].    Language Assistance Services:  [facility] provides translated documents and spoken language interpreting, free of charge and in a timely manner, when language assistance services are necessary to ensure limited English speakers have meaningful access to our information and services. Contact [employer] at -[phone] (toll free); TTY: 718 or [email or web address].     Civil Rights Complaints  You have the right to file a discrimination complaint if you believe you were treated in a discriminatory way by [employer]. You may contact any of the following four agencies directly to file a discrimination complaint.    U.S. Department of Health and Human Services  Office for Civil Rights (OCR)  You have the right to file a complaint with the OCR, a federal agency, if you believe you have been discriminated against because of any of the following:    Race    Disability    Color    Sex    National Origin    Age      Contact the OCR directly to file a complaint:         Director         U.S. Department of Health and Human Services  Office for Civil Rights         [address] [place] [address]         [phone] (Voice)         [phone] (TDD)         Complaint Portal - https://ocrportal.hhs.gov/ocr/portal/lobby.jsf     [place] Department of Human Rights (MDHR)  In [place], you have the right to file a complaint with the MDHR if you believe you have been discriminated against because of any of the following:      Race    Color    National Origin    Buddhism    Creed    Sex    Sexual Orientation    Marital Status    Public Assistance Status    Disability    Contact the [facility] directly to file a complaint:         [facility] Department  of Human Rights         [address] [address]         [phone] (voice)          [phone] (toll free)         711 or [phone] ([place] Relay)         [phone] (Fax)         [email or web address]. (Email)     [place] Department of Human Services (DHS)  You have the right to file a complaint with [facility] if you believe you have been discriminated against in our health care programs because of any of the following:    Race    Color    National Origin    Creed    Synagogue    Age    Public Assistance Status    Receipt of Health Care Services    Disability (including physical or mental impairment)    Sex (including sex stereotypes and gender identity)    Marital Status    Political Beliefs    Medical Condition    Genetic Information    Sexual Orientation    Claims Experience    Medical History    Health Status    Complaints must be in writing and filed within 180 days of the date you discovered the alleged discrimination. The complaint must contain your name and address and describe the discrimination you are complaining about. After we get your complaint, we will review it and notify you in writing about whether we have authority to investigate. If we do, we will investigate the complaint.      [facility] will notify you in writing of the investigation s outcome. You have a right to appeal the outcome if you disagree with the decision. To appeal, you must send a written request to have [facility] review the investigation outcome period. Be brief and state why you disagree with the decision. Include additional information you think is important.      If you file a complaint in this way, the people who work for the agency named in the complaint cannot retaliate against you. This means they cannot punish you in any way for filing a complaint. Filing a complaint in this way does not stop you from seeking out other legal or administration actions.     Contact [facility] directly to file a  discrimination complaint:        Civil Rights Coordinator        [place] Department of Human Services        Equal Opportunity and Access Division        [address] [address]        [place] [address] [phone] (voice) or use your preferred relay service     [facility] Complaint Notice   You have the right to file a complaint with [employer] if you believe you have been discriminated against because of any of the following:       Medical condition    Health status    Receipt of health care services    Claims experience    Medical history    Genetic information    Disability (including mental or physical impairment)    Marital status    Age    Sex (including sex stereotypes and gender identity)    Sexual orientation    National origin    Race    Color    Lutheran    Creed    Public assistance status    Political beliefs    You can file a complaint and ask for help in filing a complaint in person or by mail, phone, fax, or email at:     [facility] Civil Rights Coordinator  [facility] [employer] [facility]  [address], [place] [address] [phone] (voice and fax) or TTY:118  Email: [email or web address]    American Indians can continue to use [place] and [facility] (S) clinics. We will not require prior approval or impose any conditions for you to get services at these clinics. For elders age 65 years and older this includes Elderly Waiver (EW) services accessed through the [facility]. If a doctor or other provider in a [place] or IHS clinic refers you to a provider in our network, we will not require you to see your primary care provider prior to the referral.    For accessible formats of this publication or assistance with equal or access to our services, visit [email or web address], or call [phone] (toll free) or use your preferred relay service.

## 2019-10-01 NOTE — LETTER
October 9, 2019    Important [employer] Information    MADY NICHOLE  [address]   [place] [address]  I'm Here to Help  Dear Mady,  My name is Chanda Pollack RN, and I am your [employer] Care Coordinator. You indicated you are not interested in meeting with me to complete a health risk assessment.   As a Care Coordinator, I am here to help. My role is to make sure that your health plan is working for you. I am available to:     Review your health care needs with you over the phone or in-person     Provide support for and information about covered services or supplies to help keep you safe and healthy in your home    Answer questions about your insurance     Help you find a provider, such as a doctor or dentist, to meet your unique needs    Enclosed is a Self-Risk Assessment form that we ask you to fill out so we can get to know you a little bit better. Once completed, please send back in the self-addressed stamped envelope.     Questions?  Call me at [phone] Monday-Friday between 8am and 5pm. TTY/TDD: 711. If you d like, a friend or family member may call for you.   You may also call [employer] Customer Service at [phone] or [phone] (toll free) from 8 a.m. - 8 p.m. Central, seven days a week. Access to representatives may be limited at times. TTY/TDD: 711.  Sincerely,    Chanda Pollack RN    E-mail: [email or web address]  Phone: [phone]      [facility]           cc: member records                Civil Rights Notice  Discrimination is against the law. [employer] does not discriminate on the basis of any of the following:    Race    Color    National Origin    Creed    Zoroastrianism    Age    Public Assistance Status    Receipt of Health Care Services    Disability (including physical or mental impairment)    Sex (including sex stereotypes and gender identity)    Marital Status    Political Beliefs    Medical Condition    Genetic Information    Sexual Orientation    Claims  Experience    Medical History    Health Status    Auxiliary Aids and Services:  [employer] provides auxiliary aids and services, like qualified interpreters or information in accessible formats, free of charge and in a timely manner, to ensure an equal opportunity to participate in our health care programs. Contact [employer] Customer Service at [employer]/contact medicaid or call [phone] (toll free); TTY:711 or at [employer]/contactmedicaid.    Language Assistance Services:  [employer] provides translated documents and spoken language interpreting, free of charge and in a timely manner, when language assistance services are necessary to ensure limited English speakers have meaningful access to our information and services. Contact [employer] at [phone] (toll free); TTY: 718 or [employer]/contactmedicaid.     Civil Rights Complaints  You have the right to file a discrimination complaint if you believe you were treated in a discriminatory way by [employer]. You may contact any of the following four agencies directly to file a discrimination complaint.    U.S. Department of Health and Human Services  Office for Civil Rights (OCR)  You have the right to file a complaint with the OCR, a federal agency, if you believe you have been discriminated against because of any of the following:    Race    Disability    Color    Sex    National Origin    Age      Contact the OCR directly to file a complaint:         Director         U.S. Department of Health and Human Services  Office for Civil Rights         [address] [place] [address]         [phone] (Voice)         [phone] (TDD)         Complaint Portal - https://ocrportal.hhs.gov/ocr/portal/lobby.jsf     [place] Department of Human Rights (MDHR)  In [place], you have the right to file a complaint with the [facility] if you believe you have been discriminated against because of any of the following:       Race    Color    National Origin    Restorationist    Creed    Sex    Sexual Orientation    Marital Status    Public Assistance Status    Disability    Contact the MDHR directly to file a complaint:         [place] Department of Human Rights         [facility], [address] [place] [address]         [phone] (voice)          [phone] (toll free)         711 or [phone] ([place] Relay)         [phone] (Fax)         [email or web address]. (Email)     [place] Department of Human Services (DHS)  You have the right to file a complaint with [facility] if you believe you have been discriminated against in our health care programs because of any of the following:    Race    Color    National Origin    Creed    Restorationist    Age    Public Assistance Status    Receipt of Health Care Services    Disability (including physical or mental impairment)    Sex (including sex stereotypes and gender identity)    Marital Status    Political Beliefs    Medical Condition    Genetic Information    Sexual Orientation    Claims Experience    Medical History    Health Status    Complaints must be in writing and filed within 180 days of the date you discovered the alleged discrimination. The complaint must contain your name and address and describe the discrimination you are complaining about. After we get your complaint, we will review it and notify you in writing about whether we have authority to investigate. If we do, we will investigate the complaint.      [facility] will notify you in writing of the investigation s outcome. You have a right to appeal the outcome if you disagree with the decision. To appeal, you must send a written request to have [facility] review the investigation outcome period. Be brief and state why you disagree with the decision. Include additional information you think is important.      If you file a complaint in this way, the people who work for the agency named in the complaint cannot retaliate  against you. This means they cannot punish you in any way for filing a complaint. Filing a complaint in this way does not stop you from seeking out other legal or administration actions.     Contact [facility] directly to file a discrimination complaint:        Civil Rights Coordinator        [place] Department of Human Services        Equal Opportunity and Access Division        [address] [address]        [place] [address] [phone] (voice) or use your preferred relay service     [facility] Complaint Notice   You have the right to file a complaint with [employer] if you believe you have been discriminated against because of any of the following:       Medical condition    Health status    Receipt of health care services    Claims experience    Medical history    Genetic information    Disability (including mental or physical impairment)    Marital status    Age    Sex (including sex stereotypes and gender identity)    Sexual orientation    National origin    Race    Color    Christianity    Creed    Public assistance status    Political beliefs    You can file a complaint and ask for help in filing a complaint in person or by mail, phone, fax, or email at:     [facility] Civil Rights Coordinator  [facility] [facility]  [address], [place] [address] [phone] (voice and fax) or TTY:158  Email: [email or web address]    American Indians can continue to use [facility] and [facility] ([facility]) clinics. We will not require prior approval or impose any conditions for you to get services at these clinics. For elders age 65 years and older this includes Elderly Waiver (EW) services accessed through the [employer]. If a doctor or other provider in a [place] or I[facility] clinic refers you to a provider in our network, we will not require you to see your primary care provider prior to the referral.    For accessible formats of this publication or assistance with equal or access to our services, visit  [email or web address], or call [phone] (toll free) or use your preferred relay service.

## 2019-10-09 NOTE — PROGRESS NOTES
[facility] Care Coordination Contact    Contacted daughter Dulce Maria and she reports that member has been having a tooth pain so she bought a plane ticket for her dad to go back to [place], as [place] in [place]. Member is leaving Oct 14 and will be back November 10. Was able to make an appt to see him for when he returns. [employer] Refusal of visit POC completed as directed by health plan. MMIS completed and CPS updated. Request to CMS to process refusal. Will see member when he returns early November.  Chanda Pollack RN, PHN, [facility] Care Coordination  [phone],

## 2019-11-12 ENCOUNTER — PATIENT OUTREACH (OUTPATIENT)
Dept: GERIATRIC MEDICINE | Facility: CLINIC | Age: 74
End: 2019-11-12

## 2019-11-12 ASSESSMENT — ACTIVITIES OF DAILY LIVING (ADL)
DEPENDENT_IADLS:: CLEANING;COOKING;LAUNDRY;SHOPPING;MEAL PREPARATION;MEDICATION MANAGEMENT;MONEY MANAGEMENT;TRANSPORTATION

## 2019-11-12 NOTE — PROGRESS NOTES
[facility] Care Coordination Contact    [facility] Initial Assessment     Home visit for Initial Health Risk Assessment with Roselia العراقي completed on November 12, 2019    Type of residence:: Apartment - handicap accessible  Current living arrangement:: I live in a private home with spouse     Assessment completed with:: Patient, Other, Spouse or significant other, Children(Spouse Vj Brown, daughter Emilee,  Jaylon Bennett,)    Current Care Plan  Member currently receiving the following home care services:     Member currently receiving the following community resources: Transportation Services    Medication Review  Medication reconciliation completed in Epic: Yes  Medication set-up & administration: Family/informal caregiver sets up weekly.  Self-administers medications.  Medication Risk Assessment Medication (1 or more, place referral to MTM): N/A: No risk factors identified  MTM Referral Placed: No: No risk factors idenified    Mental/Behavioral Health   Depression Screening: See PHQ assessment flowsheet.   Mental health DX:: No      No current MH services-will place referral for declined needing further intervention    Falls Assessment:   Fallen 2 or more times in the past year?: Yes   Any fall with injury in the past year?: No    ADL/IADL Dependencies:   Dependent ADLs:: Bathing, Dressing, Grooming  Dependent IADLs:: Cleaning, Cooking, Laundry, Shopping, Meal Preparation, Medication Management, Money Management, Transportation    [facility] Health Plan sponsored benefits: Shared information re: Silver Sneakers/gym memberships, ASA, Calcium +D.    PCA Assessment completed at visit: Yes     Elderly Waiver Eligibility: Yes-will open to EW    Care Plan & Recommendations: Open member to EW for homemaking and ADC, start PCA    See LTCC for detailed assessment information.    Follow-Up Plan: Member informed of future contact, plan to f/u with member with a 6 month telephone assessment.  Contact  information shared with member and family, encouraged member to call with any questions or concerns at any time.    [facility] continuum providers: Please refer to Health Care Home on the Epic Problem List to view this patient's [facility] Care Plan Summary.  Chanda Pollack RN, PHN, [facility] Care Coordination  [phone]

## 2019-11-14 NOTE — PROGRESS NOTES
"[facility] Care Coordination Contact    Per CC, mailed client a \"Refusal of Home Visit\" letter.  MMIS entry.    Mailed member [facility] Self Report Health Assessment with self addressed return envelope.     Sol Peter  Care Management Specialist  [facility]  [phone]    "

## 2019-11-18 ENCOUNTER — PATIENT OUTREACH (OUTPATIENT)
Dept: GERIATRIC MEDICINE | Facility: CLINIC | Age: 74
End: 2019-11-18

## 2019-11-18 NOTE — PROGRESS NOTES
[facility] Care Coordination Contact    DHS form 9794 and 6075 faxed to [facility]. PCA assessment completed showing member qualifies for 5 units PCA daily. Called PRN and left a message for Bambi with the new PCA assessment and that PCA hours can start tomorrow 11/19/19. CPS updated and POC completed. Chart to CMS for processing PCA assessment and sending POC to member. Will wait to start ADC and homemaking once permission is given from [facility] financial team.  Chanda Pollack RN, PHN, [facility] Care Coordination  [phone]'

## 2019-11-21 ENCOUNTER — PATIENT OUTREACH (OUTPATIENT)
Dept: GERIATRIC MEDICINE | Facility: CLINIC | Age: 74
End: 2019-11-21

## 2019-11-21 NOTE — PROGRESS NOTES
[facility] Care Coordination Contact    Received after visit chart from care coordinator.  Completed following tasks: Mailed copy of care plan to client, Updated services in access and Submitted referrals/auths for [facility]: PCA, PCA Supervision, Homemaking (TBD), [facility]: ADC w Transportation (TBD)  Chart was returned to CC.    Provider Signature - No POC Shared:  Member indicates that they do not want their POC shared with any EW providers.  [facility]:  Faxed completed PCA assessment to PCA Agency and mailed copies to member.  Faxed MD Communication to PCP.  Emailed referral form for auth to [employer].  Printed and mailed from [employer] website, Audiologists within 10 miles of member's zip code.    Sol Peter  Care Management Specialist  [facility]  [phone]

## 2019-11-21 NOTE — LETTER
November 21, 2019    Important [employer] Information    RYAN NICHOLE  CARE OF: MADY DIONISIO  [address] [place] [address]  Your Care Plan  Dear Mady,  When we spoke recently, I promised to send you a Care Plan. The plan enclosed is a summary of our discussion. It includes the steps we agreed would help you meet your health goals. In addition, I can help you with:  Gojijbb-N-[id number]  This program is available to members who need a ride to medical and dental visits. To schedule a ride, call [phone] or [phone] (toll free). TTY/TTD: 711. You can call 8 a.m. to 8 p.m. Seven days a week. Access to a representative may be limited at times.    CareCentrix   The CareCentrix program empowers you to improve your health through education and exercise. To learn more, visit [employer], or call [employer] at [phone] (toll free) (TTY:711) from 7 a.m. - 7 p.m. Central Time, Monday-Friday.  Health Care Directive   This form helps you outline your health care wishes. You can request a form from me and I will answer any questions you have before you discuss it with your doctor.   Annual Physical  Take a key step on your path to good health and set up an annual physical at your clinic.  Questions?  Call me at [phone] Monday-Friday between 8am and 5pm.  TTY/TTD: 711. As we discussed, I plan to be in touch with you again in 6 months to follow up via phone.  Sincerely,    Chanda Pollack RN    E-mail: [email or web address]  Phone: [phone]      [place]          cc: member records                    Civil Rights Notice  Discrimination is against the law. [employer] does not discriminate on the basis of any of the following:    Race    Color    National Origin    Creed    Scientology    Age    Public Assistance Status    Receipt of Health Care Services    Disability (including physical or mental impairment)    Sex (including sex stereotypes and gender  identity)    Marital Status    Political Beliefs    Medical Condition    Genetic Information    Sexual Orientation    Claims Experience    Medical History    Health Status    Auxiliary Aids and Services:  [employer] provides auxiliary aids and services, like qualified interpreters or information in accessible formats, free of charge and in a timely manner, to ensure an equal opportunity to participate in our health care programs. Contact [employer] Customer Service at [employer]/contact medicaid or call [phone] (toll free); TTY:713 or at [employer]/contactAffirmcaid.    Language Assistance Services:  [employer] provides translated documents and spoken language interpreting, free of charge and in a timely manner, when language assistance services are necessary to ensure limited English speakers have meaningful access to our information and services. Contact [employer] at -[phone] (toll free); TTY: 664 or [employer]/contactmedicaid.     Civil Rights Complaints  You have the right to file a discrimination complaint if you believe you were treated in a discriminatory way by [employer]. You may contact any of the following four agencies directly to file a discrimination complaint.    U.S. Department of Health and Human Services  Office for Civil Rights (OCR)  You have the right to file a complaint with the OCR, a federal agency, if you believe you have been discriminated against because of any of the following:    Race    Disability    Color    Sex    National Origin    Age      Contact the OCR directly to file a complaint:         Director         U.S. Department of Health and Human Services  Office for Civil Rights         [address] [place] [address], [place] [address]         [phone] (Voice)         [phone] (TDD)         Complaint Portal - https://ocrportal.hhs.gov/ocr/portal/lobby.jsf     [place] Department of Human Rights ([facility])  In [place], you have the right to  file a complaint with the MDHR if you believe you have been discriminated against because of any of the following:      Race    Color    National Origin    Mormonism    Creed    Sex    Sexual Orientation    Marital Status    Public Assistance Status    Disability    Contact the MDHR directly to file a complaint:         [place] Department of Human Rights         [facility], [address] [place] [address]         [phone] (voice)          [phone] (toll free)         711 or [phone] ([place] Relay)         [phone] (Fax)         [email or web address] (Email)     [place] Department of Human Services (DHS)  You have the right to file a complaint with [facility] if you believe you have been discriminated against in our health care programs because of any of the following:    Race    Color    National Origin    Creed    Mormonism    Age    Public Assistance Status    Receipt of Health Care Services    Disability (including physical or mental impairment)    Sex (including sex stereotypes and gender identity)    Marital Status    Political Beliefs    Medical Condition    Genetic Information    Sexual Orientation    Claims Experience    Medical History    Health Status    Complaints must be in writing and filed within 180 days of the date you discovered the alleged discrimination. The complaint must contain your name and address and describe the discrimination you are complaining about. After we get your complaint, we will review it and notify you in writing about whether we have authority to investigate. If we do, we will investigate the complaint.      [facility] will notify you in writing of the investigation s outcome. You have a right to appeal the outcome if you disagree with the decision. To appeal, you must send a written request to have [facility] review the investigation outcome period. Be brief and state why you disagree with the decision. Include additional information you think is important.       If you file a complaint in this way, the people who work for the agency named in the complaint cannot retaliate against you. This means they cannot punish you in any way for filing a complaint. Filing a complaint in this way does not stop you from seeking out other legal or administration actions.     Contact DHS directly to file a discrimination complaint:        Civil Rights Coordinator        [facility]        Equal Opportunity and Access Division        [address] [address]        [place] [address] [phone] (voice) or use your preferred relay service     [employer] Complaint Notice   You have the right to file a complaint with [employer] if you believe you have been discriminated against because of any of the following:       Medical condition    Health status    Receipt of health care services    Claims experience    Medical history    Genetic information    Disability (including mental or physical impairment)    Marital status    Age    Sex (including sex stereotypes and gender identity)    Sexual orientation    National origin    Race    Color    Scientologist    Creed    Public assistance status    Political beliefs    You can file a complaint and ask for help in filing a complaint in person or by mail, phone, fax, or email at:     [employer] Civil Rights Coordinator  [facility]  [address], [place] [address] [phone] (voice and fax) or TTY:589  Email: [email or web address]    American Indians can continue to use [place] and  Health Services (IHS) clinics. We will not require prior approval or impose any conditions for you to get services at these clinics. For elders age 65 years and older this includes Elderly Waiver (EW) services accessed through the [place]. If a doctor or other provider in a [facility] or [facility] clinic refers you to a provider in our network, we will not require you to see your primary care provider prior to the  referral.    For accessible formats of this publication or assistance with equal or access to our services, visit [email or web address], or call [phone] (toll free) or use your preferred relay service.

## 2019-12-02 ENCOUNTER — PATIENT OUTREACH (OUTPATIENT)
Dept: GERIATRIC MEDICINE | Facility: CLINIC | Age: 74
End: 2019-12-02

## 2019-12-02 NOTE — PROGRESS NOTES
[facility] Care Coordination Contact    Received permission from [facility] to restart EW. MMIS completed and entered. Member is now a rate cell B. CPS updated and POC completed. Called daughter Emilee and left her a message with this info and to call if questions. Spoke with Gertrudis from [employer] and [employer] about homemaking to restart services starting tomorrow.   Request sent to CMS to please complete auths for both.   Chanda Pollack RN, PHN, [facility] Care Coordination  [phone]

## 2019-12-03 ENCOUNTER — MEDICAL CORRESPONDENCE (OUTPATIENT)
Dept: HEALTH INFORMATION MANAGEMENT | Facility: CLINIC | Age: 74
End: 2019-12-03

## 2019-12-10 ENCOUNTER — PATIENT OUTREACH (OUTPATIENT)
Dept: GERIATRIC MEDICINE | Facility: CLINIC | Age: 74
End: 2019-12-10

## 2019-12-10 NOTE — PROGRESS NOTES
[facility] Care Coordination Contact    Per daughter Emilee,  got a new insurance card on Friday. His old card (Card Issued: 09/26/2019) care type is: [employer] DUAL Solution. The new card ( card issued: 11/19/2019) is Advantage Solution -089. New SVS type doesn t include dental. There was recent once a year enrollment for members who want to make a health plan. Asked daughter if perhaps member made a change to a different health plan without her knowing or maybe [employer] helped him with insurance questions. Check[place] Its and it still shows member has [employer] MSHO. Recommended for her to talk with member to see if he made a change and if not, to contact [employer] customer service to see why he got a new insurance card. To let this writer know if she has more questions.  Chanda Pollack RN, PHN, [facility] Care Coordination  [phone]

## 2019-12-30 ENCOUNTER — PATIENT OUTREACH (OUTPATIENT)
Dept: GERIATRIC MEDICINE | Facility: CLINIC | Age: 74
End: 2019-12-30

## 2019-12-30 NOTE — PROGRESS NOTES
[facility] Care Coordination Contact    Received a message from Ayse from [employer] at [phone] stating that she called [employer] and go the auth for homemaking but they did not get the auth for the PCA. Noted that the PCA auth was faxed on 11/21/19 to [employer]. Called Abis and no answer. ML that the PCA auth was faxed to [employer] on 11/21/19 and was refaxed-right faxed, to Medic again today and to call back if has questions.  Chanda Pollack RN, PHN, [facility] Coordination  [phone]

## 2020-01-09 ENCOUNTER — PATIENT OUTREACH (OUTPATIENT)
Dept: GERIATRIC MEDICINE | Facility: CLINIC | Age: 75
End: 2020-01-09

## 2020-01-09 NOTE — PROGRESS NOTES
[facility] Care Coordination Contact    Received another message from Abis from [employer] at [phone] saying they checked with [employer] and are still unable to bill for PCA. Request sent to CMS to please check into this since it was already faxed twice.  Chanda Pollack RN, PHN, [facility] Care Coordination  [phone]

## 2020-01-09 NOTE — PROGRESS NOTES
[facility] Care Coordination Contact    Received calls from LIDA Leary at [phone] stating they were having issues billing for PCA and homemaking for Dec as member was not open to EW. [place] and [place] not open to EW in Dec. Checked MMIS entry and incorrect date was entered for the effective date for opening EW-12/12/19 was entered instead of 11/12/19. Screening deletion request faxed to [facility].  Chanda Pollack RN, PHN, CCM  [facility] Care Coordination  [phone]

## 2020-01-17 NOTE — PROGRESS NOTES
[facility] Care Coordination Contact    Was able to re enter MMIS showing correct effective date for EW span. Called Brianna at [employer] and left her a message that the MMIS was corrected so should be able to bill now. Requested to call back if had questions.  Chanda Pollack RN, PHN, [facility] Coordination  [phone]

## 2020-02-20 ENCOUNTER — TELEPHONE (OUTPATIENT)
Dept: FAMILY MEDICINE | Facility: CLINIC | Age: 75
End: 2020-02-20

## 2020-02-25 ENCOUNTER — OFFICE VISIT (OUTPATIENT)
Dept: FAMILY MEDICINE | Facility: CLINIC | Age: 75
End: 2020-02-25
Payer: COMMERCIAL

## 2020-02-25 VITALS
WEIGHT: 170 LBS | DIASTOLIC BLOOD PRESSURE: 80 MMHG | SYSTOLIC BLOOD PRESSURE: 138 MMHG | HEART RATE: 76 BPM | TEMPERATURE: 97.1 F | BODY MASS INDEX: 26.63 KG/M2 | OXYGEN SATURATION: 95 %

## 2020-02-25 DIAGNOSIS — E78.5 HYPERLIPIDEMIA WITH TARGET LDL LESS THAN 130: ICD-10-CM

## 2020-02-25 DIAGNOSIS — E78.5 HYPERLIPIDEMIA LDL GOAL <130: Primary | ICD-10-CM

## 2020-02-25 DIAGNOSIS — I10 ESSENTIAL HYPERTENSION: ICD-10-CM

## 2020-02-25 LAB
ALBUMIN SERPL-MCNC: 4 G/DL (ref 3.4–5)
ALP SERPL-CCNC: 60 U/L (ref 40–150)
ALT SERPL W P-5'-P-CCNC: 46 U/L (ref 0–70)
ANION GAP SERPL CALCULATED.3IONS-SCNC: 4 MMOL/L (ref 3–14)
AST SERPL W P-5'-P-CCNC: 26 U/L (ref 0–45)
BILIRUB SERPL-MCNC: 0.8 MG/DL (ref 0.2–1.3)
BUN SERPL-MCNC: 14 MG/DL (ref 7–30)
CALCIUM SERPL-MCNC: 8.8 MG/DL (ref 8.5–10.1)
CHLORIDE SERPL-SCNC: 106 MMOL/L (ref 94–109)
CO2 SERPL-SCNC: 27 MMOL/L (ref 20–32)
CREAT SERPL-MCNC: 0.86 MG/DL (ref 0.66–1.25)
GFR SERPL CREATININE-BSD FRML MDRD: 84 ML/MIN/{1.73_M2}
GLUCOSE SERPL-MCNC: 102 MG/DL (ref 70–99)
LDLC SERPL DIRECT ASSAY-MCNC: 77 MG/DL
POTASSIUM SERPL-SCNC: 3.9 MMOL/L (ref 3.4–5.3)
PROT SERPL-MCNC: 7.9 G/DL (ref 6.8–8.8)
SODIUM SERPL-SCNC: 137 MMOL/L (ref 133–144)

## 2020-02-25 PROCEDURE — 80053 COMPREHEN METABOLIC PANEL: CPT | Performed by: FAMILY MEDICINE

## 2020-02-25 PROCEDURE — 83721 ASSAY OF BLOOD LIPOPROTEIN: CPT | Performed by: FAMILY MEDICINE

## 2020-02-25 PROCEDURE — 99214 OFFICE O/P EST MOD 30 MIN: CPT | Performed by: FAMILY MEDICINE

## 2020-02-25 PROCEDURE — 36415 COLL VENOUS BLD VENIPUNCTURE: CPT | Performed by: FAMILY MEDICINE

## 2020-02-25 RX ORDER — LOSARTAN POTASSIUM 50 MG/1
50 TABLET ORAL DAILY
Qty: 90 TABLET | Refills: 1 | Status: SHIPPED | OUTPATIENT
Start: 2020-02-25 | End: 2020-10-01

## 2020-02-25 RX ORDER — SIMVASTATIN 20 MG
20 TABLET ORAL AT BEDTIME
Qty: 90 TABLET | Refills: 1 | Status: SHIPPED | OUTPATIENT
Start: 2020-02-25 | End: 2020-08-18

## 2020-02-25 NOTE — PROGRESS NOTES
Subjective     Roselia العراقي is a 75 year old male who presents to clinic today for the following health issues:    HPI   Hypertension Follow-up      Do you check your blood pressure regularly outside of the clinic? No - brother recently passed away from stroke earlier this month. Visit to [place] and EKG/US was done, would like to discuss results.    Are you following a low salt diet? Yes    Are your blood pressures ever more than 140 on the top number (systolic) OR more   than 90 on the bottom number (diastolic), for example 140/90? NA      How many days per week do you exercise enough to make your heart beat faster? 3 or less    How many minutes a day do you exercise enough to make your heart beat faster? 60 or more, basketball and walking     How many days per week do you miss taking your medication? 0      Patient Active Problem List   Diagnosis     Smoking     Dyspepsia     Varicose veins     Hyperlipidemia LDL goal <130     H. pylori infection     Vertigo, benign positional     Essential hypertension     Hyperlipidemia with target LDL less than 130     Health Care Home     Advanced directives, counseling/discussion     Left knee pain     Benign neoplasm of colon     No past surgical history on file.    Social History     Tobacco Use     Smoking status: Former Smoker     Packs/day: 0.30     Years: 20.00     Pack years: 6.00     Types: Cigarettes     Smokeless tobacco: Never Used   Substance Use Topics     Alcohol use: No     Alcohol/week: 0.0 standard drinks     Family History   Problem Relation Age of Onset     Hypertension Father      Hypertension Brother      Cerebrovascular Disease Father          Current Outpatient Medications   Medication Sig Dispense Refill     calcium carbonate 500 MG tablet Take 1 tablet by mouth daily Client taking it twice a day 100 tablet 3     fish oil-omega-3 fatty acids 1000 MG capsule Take 2 capsules by mouth daily. 180 capsule 12     losartan (COZAAR) 50 MG tablet Take 1 tablet  (50 mg) by mouth daily 90 tablet 1     simvastatin (ZOCOR) 20 MG tablet Take 1 tablet (20 mg) by mouth At Bedtime 90 tablet 1     UNABLE TO FIND MEDICATION NAME: OTC -Red Yeast Rice 1200 mg, 1 tablet twice daily       UNABLE TO FIND MEDICATION NAME: PreserVision AREDS 2 formula soft gel. 1 soft gel twice daily       Allergies   Allergen Reactions     Penicillins      Sulfa Drugs Hives     Sulphasomidine      Tetracycline      Recent Labs   Lab Test 09/24/19  1036 03/21/19  1051 01/16/18  1020  10/03/13  1110  08/09/12  1446   LDL 58 98 45   < >  --    < >  --    HDL 37* 28* 44   < >  --    < >  --    TRIG 223* 381* 221*   < >  --    < >  --    ALT 32 37 23   < > 74*   < > 66   CR 0.86 0.89 0.81   < > 1.05   < > 0.99   GFRESTIMATED 85 84 >90   < > 70   < > 75   GFRESTBLACK >90 >90 >90   < > 85   < > >90   POTASSIUM 4.3 4.2 4.3   < > 3.6   < > 4.3   TSH  --   --   --   --  2.04  --  1.82    < > = values in this interval not displayed.      BP Readings from Last 3 Encounters:   02/25/20 138/80   09/24/19 130/72   03/21/19 136/88    Wt Readings from Last 3 Encounters:   02/25/20 77.1 kg (170 lb)   09/24/19 75.8 kg (167 lb)   03/21/19 75.8 kg (167 lb)            Reviewed and updated as needed this visit by Provider         Review of Systems   ROS COMP: Constitutional, HEENT, cardiovascular, pulmonary, gi and gu systems are negative, except as otherwise noted.      Objective    /80   Pulse 76   Temp 97.1  F (36.2  C) (Tympanic)   Wt 77.1 kg (170 lb)   SpO2 95%   BMI 26.63 kg/m    Body mass index is 26.63 kg/m .  Physical Exam   GENERAL: healthy, alert and no distress  EYES: Eyes grossly normal to inspection, PERRL and conjunctivae and sclerae normal  HENT: ear canals and TM's normal, nose and mouth without ulcers or lesions  NECK: no adenopathy, no asymmetry, masses, or scars and thyroid normal to palpation  RESP: lungs clear to auscultation - no rales, rhonchi or wheezes  CV: regular rate and rhythm, normal S1  S2, no S3 or S4, no murmur, click or rub, no peripheral edema and peripheral pulses strong  ABDOMEN: soft, nontender, no hepatosplenomegaly, no masses and bowel sounds normal  MS: no gross musculoskeletal defects noted, no edema  SKIN: no suspicious lesions or rashes  NEURO: Normal strength and tone, mentation intact and speech normal  BACK: no CVA tenderness, no paralumbar tenderness  PSYCH: mentation appears normal, affect normal/bright  LYMPH: no cervical, supraclavicular, axillary, or inguinal adenopathy            Assessment & Plan     1. Hyperlipidemia LDL goal <130  Has been stable with current dose of meds, has no side effect from the meds will keep monitoring   - Comprehensive metabolic panel  - LDL cholesterol direct    2. Essential hypertension  Stable, keep monitoring   - Comprehensive metabolic panel  - LDL cholesterol direct  - losartan (COZAAR) 50 MG tablet; Take 1 tablet (50 mg) by mouth daily  Dispense: 90 tablet; Refill: 1    3. Hyperlipidemia with target LDL less than 130  Stable, will review the lab results and update pt  - simvastatin (ZOCOR) 20 MG tablet; Take 1 tablet (20 mg) by mouth At Bedtime  Dispense: 90 tablet; Refill: 1       FUTURE APPOINTMENTS:       - Follow-up visit in 6 months for med check and CPE    No follow-ups on file.    David Turcios MD  [facility]

## 2020-04-28 ENCOUNTER — PATIENT OUTREACH (OUTPATIENT)
Dept: GERIATRIC MEDICINE | Facility: CLINIC | Age: 75
End: 2020-04-28

## 2020-05-14 ENCOUNTER — PATIENT OUTREACH (OUTPATIENT)
Dept: GERIATRIC MEDICINE | Facility: CLINIC | Age: 75
End: 2020-05-14

## 2020-05-14 NOTE — PROGRESS NOTES
[facility] Care Coordination Contact     Received an email from member's daughter asking about the alternative ADS that the state okayed. Checked with Gertrudis Tejeda, director of [facility] and the plan is for the ADS to offer breakfast and lunch as well as group classed through [employer]. Plan is to start services on Monday May 18 and the most that can be offered is 4 hrs per day. Member can sign up to take exercise, group discussions, singing classes, etc.      Called member and spouse and they are staying with daughter. Was able to explain the new ADS offering and they are interested in attending ADS remotely. Daughter has 2 extra ipads that member and spouse can use for the classes.     CPS and request sent to CMS to please send a new auth in for the ADS.  Chanda Pollack RN, PHN, [facility] Care Coordination  [phone]

## 2020-06-04 ENCOUNTER — PATIENT OUTREACH (OUTPATIENT)
Dept: GERIATRIC MEDICINE | Facility: CLINIC | Age: 75
End: 2020-06-04

## 2020-06-04 NOTE — PROGRESS NOTES
[facility] Care Coordination Contact    Received a message from Brianna from PCA agency at [phone] asking about auth for PCA services. Called Brianna back but she said she was busy and not at her computer and will call this writer back. Informed her that this writer was off on Friday and she said she was too.  Chanda Pollack RN, PHN, [facility]  [phone]

## 2020-06-30 NOTE — PROGRESS NOTES
[facility] Care Coordination Contact    Received a message from Gertrudis Tejeda from [facility] that an auth has not been submitted for the [facility]. CMS submitted an auth today for this. Gertrudis diallo.  Chanda Pollack RN, PHN, [facility] Care Coordination  Fnfxgt-[phone]. cell-[phone]

## 2020-07-01 ENCOUNTER — NURSE TRIAGE (OUTPATIENT)
Dept: FAMILY MEDICINE | Facility: CLINIC | Age: 75
End: 2020-07-01

## 2020-07-01 ENCOUNTER — OFFICE VISIT (OUTPATIENT)
Dept: FAMILY MEDICINE | Facility: CLINIC | Age: 75
End: 2020-07-01
Payer: COMMERCIAL

## 2020-07-01 VITALS
TEMPERATURE: 97.9 F | OXYGEN SATURATION: 96 % | HEART RATE: 74 BPM | WEIGHT: 165 LBS | BODY MASS INDEX: 25.84 KG/M2 | SYSTOLIC BLOOD PRESSURE: 138 MMHG | DIASTOLIC BLOOD PRESSURE: 80 MMHG

## 2020-07-01 DIAGNOSIS — K59.00 CONSTIPATION, UNSPECIFIED CONSTIPATION TYPE: ICD-10-CM

## 2020-07-01 DIAGNOSIS — K92.1 BLOOD IN THE STOOL: Primary | ICD-10-CM

## 2020-07-01 LAB — HGB BLD-MCNC: 15.9 G/DL (ref 13.3–17.7)

## 2020-07-01 PROCEDURE — 99214 OFFICE O/P EST MOD 30 MIN: CPT | Performed by: FAMILY MEDICINE

## 2020-07-01 PROCEDURE — 36415 COLL VENOUS BLD VENIPUNCTURE: CPT | Performed by: FAMILY MEDICINE

## 2020-07-01 PROCEDURE — 85018 HEMOGLOBIN: CPT | Performed by: FAMILY MEDICINE

## 2020-07-01 RX ORDER — POLYETHYLENE GLYCOL 3350 17 G/17G
1 POWDER, FOR SOLUTION ORAL DAILY
Qty: 225 G | Refills: 0 | Status: SHIPPED | OUTPATIENT
Start: 2020-07-01 | End: 2020-10-01

## 2020-07-01 RX ORDER — HYDROCORTISONE 25 MG/G
CREAM TOPICAL 2 TIMES DAILY PRN
Qty: 30 G | Refills: 0 | Status: SHIPPED | OUTPATIENT
Start: 2020-07-01 | End: 2021-09-08

## 2020-07-01 NOTE — LETTER
July 2, 2020      Roselia العراقي  [address]   [place] [address]        Dear ,    We are writing to inform you of your test results.    Your test results fall within the expected range(s) or remain unchanged from previous results.  Please continue with current treatment plan.    Resulted Orders   Hemoglobin   Result Value Ref Range    Hemoglobin 15.9 13.3 - 17.7 g/dL       If you have any questions or concerns, please call the clinic at the number listed above.       Sincerely,        Artemio Aguilar MD

## 2020-07-01 NOTE — TELEPHONE ENCOUNTER
"Patient's daughter reporting that patient had hard stool last night. Reports small amount of blood prior to BM and drops of blood in the toilet. Small amount on toilet paper. No clots. No abdominal pain or any other symptoms.  Patient requested to be seen in clinic today. No COVID symptoms or known exposure.    Additional Information    Negative: Passed out (i.e., fainted, collapsed and was not responding)    Negative: Shock suspected (e.g., cold/pale/clammy skin, too weak to stand, low BP, rapid pulse)    Negative: Vomiting red blood or black (coffee ground) material    Negative: Sounds like a life-threatening emergency to the triager    Negative: Diarrhea is the main symptom    Negative: Rectal symptoms    Negative: SEVERE rectal bleeding (large blood clots; on and off, or constant bleeding)    Negative: SEVERE dizziness (e.g., unable to stand, requires support to walk, feels like passing out now)    Negative: MODERATE rectal bleeding (small blood clots, passing blood without stool, or toilet water turns red) more than once a day    Negative: Bloody, black, or tarry bowel movements    Negative: High-risk adult (e.g., prior surgery on aorta, abdominal aortic aneurysm)    Negative: Rectal foreign body (inserted or swallowed)    Negative: SEVERE abdominal pain (e.g., excruciating)    Negative: Constant abdominal pain lasting > 2 hours    Negative: Pale skin (pallor) of new onset or worsening    Negative: Patient sounds very sick or weak to the triager    Negative: MODERATE rectal bleeding (small blood clots, passing blood without stool, or toilet water turns red)    Negative: Taking Coumadin (warfarin) or other strong blood thinner, or known bleeding disorder (e.g., thrombocytopenia)    Negative: Colonoscopy in past 72 hours    Negative: Known cirrhosis of the liver (or history of liver failure or ascites)    Patient wants to be seen    Answer Assessment - Initial Assessment Questions  1. APPEARANCE of BLOOD: \"What " "color is it?\" \"Is it passed separately, on the surface of the stool, or mixed in with the stool?\"       True blood color  2. AMOUNT: \"How much blood was passed?\"       Not very much  3. FREQUENCY: \"How many times has blood been passed with the stools?\"       Once, last night  4. ONSET: \"When was the blood first seen in the stools?\" (Days or weeks)       Last night was the first time. Felt watery feeling. Stool very hard. Wiped and a little bit on the toilet paper the size of a spoon.   5. DIARRHEA: \"Is there also some diarrhea?\" If so, ask: \"How many diarrhea stools were passed in past 24 hours?\"       none  6. CONSTIPATION: \"Do you have constipation?\" If so, \"How bad is it?\"      Couple of days very hard stool. Stool every other day, but hard.   7. RECURRENT SYMPTOMS: \"Have you had blood in your stools before?\" If so, ask: \"When was the last time?\" and \"What happened that time?\"       No, never before.  8. BLOOD THINNERS: \"Do you take any blood thinners?\" (e.g., Coumadin/warfarin, Pradaxa/dabigatran, aspirin)      no  9. OTHER SYMPTOMS: \"Do you have any other symptoms?\"  (e.g., abdominal pain, vomiting, dizziness, fever)      none  10. PREGNANCY: \"Is there any chance you are pregnant?\" \"When was your last menstrual period?\"        NA    Protocols used: RECTAL BLEEDING-A-DOREEN Forman RN    "

## 2020-07-01 NOTE — PROGRESS NOTES
Ophelia العراقي is a 75 year old male who presents to clinic today for the following health issues:    HPI       Constipation     Onset: yesterday     Description:   Frequency of bowel movements: . Every 2 days   Stool consistency: hard, dry     Progression of Symptoms:  worsening    Accompanying Signs & Symptoms:  Abdominal pain (cramping?): no   Blood in stool: YES  Rectal pain: no   Nausea/vomiting: no   Weight loss or gain: no    History:   History of abdominal surgery: no     Precipitating factors:   Recent use of narcotics, anticholinergics, calcium channel blockers, antacids, or iron supplements: no   Chronic Laxative Use: no          Therapies Tried and outcome: fiber daily     Patient has been dealing with mild to moderate constipation and he had 1 episode of blood in the stool which is fresh blood.  Denies any abdominal pain there is no nausea vomiting with diarrhea.  There is no headaches blurry vision.        Reviewed and updated as needed this visit by Provider         Review of Systems   Constitutional, HEENT, cardiovascular, pulmonary, gi and gu systems are negative, except as otherwise noted.      Objective    There were no vitals taken for this visit.  There is no height or weight on file to calculate BMI.  Physical Exam   GENERAL: healthy, alert and no distress  RESP: lungs clear to auscultation - no rales, rhonchi or wheezes  CV: regular rate and rhythm, normal S1 S2, no S3 or S4, no murmur, click or rub, no peripheral edema and peripheral pulses strong  ABDOMEN: soft, nontender, no hepatosplenomegaly, no masses and bowel sounds normal  Rectal exam done there is no obvious in the rectal vault.  Small external hemorrhoid but not thrombosed.  Rectal tone is normal          Assessment & Plan     1. Blood in the stool  Patient has one episode of blood in the stool which is most likely due to constipation and passing hard stools.  This could be a small external hemorrhoid and small internal  "hemorrhoids may be causing the bleed.  There is no fissure.  I suggested to use hydrocortisone cream for the next 1 week increase fiber in the diet advised Benefiber.  - Hemoglobin  - hydrocortisone, Perianal, (HYDROCORTISONE) 2.5 % cream; Place rectally 2 times daily as needed for hemorrhoids  Dispense: 30 g; Refill: 0    2. Constipation, unspecified constipation type  Advised to work with constipation and use more fibers food use MiraLAX daily for the next few days and see if that helps.  If symptoms does not improve he should follow-up.  - polyethylene glycol (MIRALAX) 17 GM/SCOOP powder; Take 17 g (1 capful) by mouth daily  Dispense: 225 g; Refill: 0     BMI:   Estimated body mass index is 25.84 kg/m  as calculated from the following:    Height as of 9/24/19: 1.702 m (5' 7\").    Weight as of this encounter: 74.8 kg (165 lb).     Artemio Aguilar MD  [facility] [place]    "

## 2020-07-01 NOTE — TELEPHONE ENCOUNTER
Daughter, Emilee, calling re: patient is having blood in his stool, 1x day. No change in diet. Would like to come in to see Dr. Turcios.  Please advise  Ph# [phone], emilee  Ok to leave detailed message: yes  Thank you  An Le

## 2020-07-04 ENCOUNTER — HOSPITAL ENCOUNTER (EMERGENCY)
Facility: CLINIC | Age: 75
Discharge: HOME | End: 2020-07-04
Attending: PHYSICIAN ASSISTANT | Admitting: PHYSICIAN ASSISTANT
Payer: COMMERCIAL

## 2020-07-04 VITALS
OXYGEN SATURATION: 95 % | RESPIRATION RATE: 16 BRPM | DIASTOLIC BLOOD PRESSURE: 104 MMHG | SYSTOLIC BLOOD PRESSURE: 168 MMHG | TEMPERATURE: 98.4 F

## 2020-07-04 DIAGNOSIS — K62.5 RECTAL BLEEDING: ICD-10-CM

## 2020-07-04 PROCEDURE — 99283 EMERGENCY DEPT VISIT LOW MDM: CPT

## 2020-07-04 RX ORDER — BISACODYL 5 MG/1
5 TABLET, DELAYED RELEASE ORAL DAILY PRN
Qty: 7 TABLET | Refills: 0 | Status: SHIPPED | OUTPATIENT
Start: 2020-07-04 | End: 2020-07-04

## 2020-07-04 RX ORDER — BISACODYL 5 MG/1
5 TABLET, DELAYED RELEASE ORAL DAILY PRN
Qty: 7 TABLET | Refills: 0 | Status: SHIPPED | OUTPATIENT
Start: 2020-07-04 | End: 2020-10-01

## 2020-07-04 ASSESSMENT — ENCOUNTER SYMPTOMS
CHILLS: 0
DYSURIA: 0
ABDOMINAL PAIN: 0
BLOOD IN STOOL: 1
FEVER: 0
LIGHT-HEADEDNESS: 0
CONSTIPATION: 1
DIFFICULTY URINATING: 1

## 2020-07-04 NOTE — ED PROVIDER NOTES
History   Chief Complaint:  Blood in Stool     HPI   Roselia العراقي is a 75 year old male with a history of upper GI bleed, hypertension, and hyperlipidemia who presents with blood in the stool. On review of the patient's chart, the patient saw his primary care physician three days ago for evaluation of one week of constipation and one episode of blood in the stool. A hemoglobin was obtained at that time and was normal at 15.9. He was sent home with Miralax and a hydrocortisone cream to be applied to the perianal area, both of which he has been using. The patient reports that despite this, he has had continued hard stools and episodes of bright red blood in the stool. He describes the blood as being in the toilet bowl and on the wipe as opposed to being mixed in with the stool. He also endorses difficulty urinating. Due to persistence of symptoms, the patient presents to the ED today for further evaluation. He denies abdominal pain, dysuria, and lightheadedness. He further denies fever, chills and chest pain. The patient has no history of hemorrhoids or anal fissures.    Allergies:  Penicillins  Sulfa Drugs  Sulfisomidine  Tetracycline    Medications:   Losartan  Miralax  Simvastatin    Past Medical History:    Gastric ulcer  GERD  Hyperlipidemia   Essential hypertension  Upper GI bleed  BPPV  Benign neoplasm of colon  H. Pylori  Dyspepsia   Varicose veins    Past Surgical History:    History reviewed. No pertinent surgical history.    Family History:    Hypertension  Cerebrovascular disease    Social History:  The patient presents to the ED accompanied by his daughter.  Smoking status- former smoker  Alcohol use- no  Drug use- no    Review of Systems   Constitutional: Negative for chills and fever.   Cardiovascular: Negative for chest pain.   Gastrointestinal: Positive for blood in stool and constipation. Negative for abdominal pain.   Genitourinary: Positive for difficulty urinating. Negative for dysuria.    Neurological: Negative for light-headedness.   All other systems reviewed and are negative.    Physical Exam     Patient Vitals for the past 24 hrs:   BP Temp Temp src Heart Rate Resp SpO2   07/04/20 1601 (!) 168/104 98.4  F (36.9  C) Temporal 74 -- --   07/04/20 1519 (!) 184/95 -- -- 69 16 95 %     Physical Exam  General: Alert and interactive. Appears well. Cooperative and pleasant.   Eyes: The pupils are equal and round. EOMs intact. No scleral icterus.  ENT: No abnormalities to the external nose or ears. Mucous membranes moist. Posterior oropharynx is non-erythematous.  Neck: Trachea is in the midline. No nuchal rigidity.     CV: Regular rate and rhythm. S1 and S2 normal without murmur, click, gallop or rub.   Resp: Breath sounds are clear bilaterally, without rhonchi, wheezes, rales. Non-labored, no retractions or accessory muscle use.     GI: Abdomen is soft without distension. No tenderness to palpation. No peritoneal signs.    Rectal: No obvious external hemorrhoids or palpable internal hemorrhoids. There is no obvious fissure. Scant amount of blood on glove. No active extravasation.   MS: Moving all extremities well. Good muscle tone.   Skin: Warm and dry. No rash or lesions noted.  Neuro: Alert and oriented x 3. No focal neurologic deficits. Good strength and sensation in upper and lower extremities. Psych: Awake. Alert.  Normal affect. Appropriate interactions.  Lymph: No anterior or posterior cervical lymphadenopathy noted.    Emergency Department Course   Emergency Department Course:  Past medical records, nursing notes, and vitals reviewed.  1541 I performed an exam of the patient as documented above. I performed a rectal examine with a chaperon. Explained the findings to the patient.    Findings and plan explained to the patient. Patient discharged home with instructions regarding supportive care, medications, and reasons to return. The importance of close follow-up was reviewed.     Impression &  Plan   Medical Decision Making:  Roselia العراقي is a 75 year old male who presents for evaluation of painless rectal bleeding in the setting of chronic constipation. There has been only mild associated bleeding with this. Patient had hemoglobin checked three days ago, and it was normal. He was started on Hydrocortisone and Miralax, per primary care. He continues to have blood in stool and has not had relief with his constipation, and he is worried about malignancy.      A broad differential was considered including fissure, hemorrhoid, mass/tumor, perirectal abscess, inflammatory bowel disease, foreign body, and others. The workup and history indicate the bleeding is likely secondary to hard stools or small internal hemorrhoid, although I cannot visualize one on exam. There are no signs of worrisome findings to warrant further workup, colorectal surgery consultation or admission. Medications for discharge include stool softeners and follow up with GI within one week for definitive imaging with colonoscopy. Return for greatly increased bleeding, lightheadedness, dizziness.     Diagnosis:    ICD-10-CM   1. Rectal bleeding  K62.5     Disposition:  Discharged to home.      Discharge Medications:   Details   bisacodyl (DULCOLAX) 5 MG EC tablet Take 1 tablet (5 mg) by mouth daily as needed for constipation  Qty: 7 tablet, Refills: 0         Scribe Disclosure:  I, Grzegorz Dent, am serving as a scribe at 3:41 PM on 7/4/2020 to document services personally performed by Sandee May PA-C based on my observations and the provider's statements to me.        Sandee May PA-C  07/04/20 1374

## 2020-07-04 NOTE — ED TRIAGE NOTES
Pt has had blood in his stools (drops of bright red) for the last few days.  Was seen by primary and given stool softener.

## 2020-07-04 NOTE — DISCHARGE INSTRUCTIONS
Continue to soften your stools.   Do not strain.   Follow up with GI if your symptoms are persisting for colonoscopy.

## 2020-07-04 NOTE — ED AVS SNAPSHOT
Emergency Department  [address] [place] [id number]  Phone:  [phone]  Fax:  [phone]                                    Roselia العراقي   MRN: [id number]    Department:   Emergency Department   Date of Visit:  7/4/2020           After Visit Summary Signature Page    I have received my discharge instructions, and my questions have been answered. I have discussed any challenges I see with this plan with the nurse or doctor.    ..........................................................................................................................................  Patient/Patient Representative Signature      ..........................................................................................................................................  Patient Representative Print Name and Relationship to Patient    ..................................................               ................................................  Date                                   Time    ..........................................................................................................................................  Reviewed by Signature/Title    ...................................................              ..............................................  Date                                               Time          22EPIC Rev 08/18

## 2020-07-06 ENCOUNTER — PATIENT OUTREACH (OUTPATIENT)
Dept: GERIATRIC MEDICINE | Facility: CLINIC | Age: 75
End: 2020-07-06

## 2020-07-06 NOTE — PROGRESS NOTES
[facility] Care Coordination Contact  CC received notification of Emergency Room visit.  ER visit occurred on 7/4/20 at [facility] with Dx of rectal bleeding..    CC contacted adult daughter Emilee and reviewed discharge summary.  Member has a follow-up appointment with PCP: No: Offered Assistance with setting up a follow up appointment . Member continues with rectal bleeding so conference call with daughter, who was with member and clinic. Dr Turcios has no openings so was able to make an appt for member to see Dr Shah tomorrow afternoon. Daughter and member understand that member may need a colonoscopy and will wait to see what Dr Shah recommends.  Member has had a change in condition: Yes: Rectal bleeding.  New referrals placed: No  Home Visit Needed: No  Care plan reviewed and updated.  PCP notified of ED visit via EMR.  Chanda Pollack RN, PHN, CCM  [facility] Care Coordination  Tlqxun-[phone]. [phone]

## 2020-07-07 ENCOUNTER — OFFICE VISIT (OUTPATIENT)
Dept: FAMILY MEDICINE | Facility: CLINIC | Age: 75
End: 2020-07-07
Payer: COMMERCIAL

## 2020-07-07 VITALS
BODY MASS INDEX: 25.84 KG/M2 | DIASTOLIC BLOOD PRESSURE: 76 MMHG | WEIGHT: 165 LBS | SYSTOLIC BLOOD PRESSURE: 146 MMHG | OXYGEN SATURATION: 97 % | TEMPERATURE: 98.1 F | HEART RATE: 74 BPM

## 2020-07-07 DIAGNOSIS — K59.09 OTHER CONSTIPATION: ICD-10-CM

## 2020-07-07 DIAGNOSIS — Z86.0100 HISTORY OF COLONIC POLYPS: ICD-10-CM

## 2020-07-07 DIAGNOSIS — K62.5 RECTAL BLEEDING: Primary | ICD-10-CM

## 2020-07-07 LAB
ERYTHROCYTE [DISTWIDTH] IN BLOOD BY AUTOMATED COUNT: 14.1 % (ref 10–15)
HCT VFR BLD AUTO: 46.4 % (ref 40–53)
HGB BLD-MCNC: 15.3 G/DL (ref 13.3–17.7)
MCH RBC QN AUTO: 29.1 PG (ref 26.5–33)
MCHC RBC AUTO-ENTMCNC: 33 G/DL (ref 31.5–36.5)
MCV RBC AUTO: 88 FL (ref 78–100)
PLATELET # BLD AUTO: 195 10E9/L (ref 150–450)
RBC # BLD AUTO: 5.26 10E12/L (ref 4.4–5.9)
WBC # BLD AUTO: 6.8 10E9/L (ref 4–11)

## 2020-07-07 PROCEDURE — 99214 OFFICE O/P EST MOD 30 MIN: CPT | Performed by: FAMILY MEDICINE

## 2020-07-07 PROCEDURE — 36415 COLL VENOUS BLD VENIPUNCTURE: CPT | Performed by: FAMILY MEDICINE

## 2020-07-07 PROCEDURE — 85027 COMPLETE CBC AUTOMATED: CPT | Performed by: FAMILY MEDICINE

## 2020-07-07 NOTE — PROGRESS NOTES
Subjective     Roselia العراقي is a 75 year old male who presents to clinic today for the following health issues:    HPI   ED/UC Followup:    Facility:  [facility]  Date of visit: 7/4/20  Reason for visit: Rectal bleeding  Current Status: yesterday did have a small amount of blood in stool today seems to be better   Here to do follow up ,  recent visit in ER.   He was evaluated by another provider virtually on 7/ 1.  Was given MiraLAX, although his constipation felt worse and still had some blood in the stools , so he went to the ER on 7/ 4.    His bleeding was thought to be related to his constipation.  Hemoglobin was stable.  he has been given rectal cream and also stool softener to help.   No abdominal pain . Still constipated although improving with laxative and also using rectal cream and had much  less blood since er.  no rectal pain.     PROBLEMS TO ADD ON...  Constipation  Duration of complaint: ongoing since last few weeks   Description:   Frequency of bowel movements: usually once every 2- 3 day but now  daily since taking med's form ER    Stool consistency: always hard, although slightly better since er   Progression of Symptoms:  Better , but he is concerned because of his previous history of colon polyp.  Last colonoscopy he was told that he needed 3 to 5-year follow-up, so he is due for his next colonoscopy in 2021.  But just wondering  if needs to go back  sooner.   Accompanying Signs & Symptoms:  Abdominal pain (cramping?): no   Blood in stool: YES, as above   Rectal pain: no   Nausea/vomiting: no   Weight loss or gain: no   History:   History of abdominal surgery: no   Precipitating factors:   Recent use of narcotics,or change in med's : no  Chronic Laxative Use: no   Therapies Tried and outcome: none      Patient Active Problem List   Diagnosis     Smoking     Dyspepsia     Varicose veins     Hyperlipidemia LDL goal <130     H. pylori infection     Vertigo, benign positional     Essential hypertension      Hyperlipidemia with target LDL less than 130     Health Care Home     Advanced directives, counseling/discussion     Left knee pain     Benign neoplasm of colon     No past surgical history on file.    Social History     Tobacco Use     Smoking status: Former Smoker     Packs/day: 0.30     Years: 20.00     Pack years: 6.00     Types: Cigarettes     Smokeless tobacco: Never Used   Substance Use Topics     Alcohol use: No     Alcohol/week: 0.0 standard drinks     Family History   Problem Relation Age of Onset     Hypertension Father      Cerebrovascular Disease Father      Hypertension Brother                  Reviewed and updated as needed this visit by Provider         Review of Systems   Constitutional, HEENT, cardiovascular, pulmonary, GI, , musculoskeletal, neuro, skin, endocrine and psych systems are negative, except as otherwise noted.      Objective    Temp 98.1  F (36.7  C)   Wt 74.8 kg (165 lb)   BMI 25.84 kg/m    Body mass index is 25.84 kg/m .  Physical Exam   GENERAL: healthy, alert and no distress  RESP: lungs clear to auscultation - no rales, rhonchi or wheezes  CV: regular rate and rhythm, normal S1 S2, no S3 or S4,   ABDOMEN: soft, nontender, no hepatosplenomegaly, no masses and bowel sounds normal  RECTAL: deferred, reviewed ER notes, exam was benign at the time.           Assessment & Plan     (K62.5) Rectal bleeding  (primary encounter diagnosis)  Comment: likely internal hemorrhoids vs other  Plan: CBC with platelets, GASTROENTEROLOGY ADULT REF         CONSULT ONLY, CANCELED: COLORECTAL SURGERY         REFERRAL            (K59.09) Other constipation  Comment:   Plan: CBC with platelets, GASTROENTEROLOGY ADULT REF         CONSULT ONLY, CANCELED: COLORECTAL SURGERY         REFERRAL            (Z86.010) History of colonic polyps  Comment:   Plan: GASTROENTEROLOGY ADULT REF CONSULT ONLY              Discussed possible differential diagnosis for his  Symptoms. Most likely internal hemorrhoid,  causing bleeding bc of constipation. discussed high fibre / fluid diet to regulate Bm. Continue with  Anusol cream  and colace to help as needed.  Reminded also  to use his MiraLAX regularly. . Talked about warning s/s for which should be seen urgently.   Cares and symptomatic treatment discussed. Referral given to  follow up if with his GI for  further evaluation.  May need to consider scheduling colonoscopy earlier if needed.          Ban Shah MD  [facility] [place] [place]

## 2020-07-07 NOTE — PATIENT INSTRUCTIONS
Patient Education     Constipation (Adult)  Constipation means that you have bowel movements that are less frequent than usual. Stools often become very hard and difficult to pass.  Constipation is very common. At some point in life, it affects almost everyone. Since everyone's bowel habits are different, what is constipation to one person may not be to another. Your healthcare provider may do tests to diagnose constipation. It depends on what he or she finds when evaluating you.    Symptoms of constipation include:    Abdominal pain    Bloating    Vomiting    Painful bowel movements    Itching, swelling, bleeding, or pain around the anus  Causes  Constipation can have many causes. These include:    Diet low in fiber    Too much dairy    Not drinking enough liquids    Lack of exercise or physical activity (especially true for older adults)    Changes in lifestyle or daily routine, including pregnancy, aging, work, and travel    Frequent use or misuse of laxatives    Ignoring the urge to have a bowel movement or delaying it until later    Medicines, such as certain prescription pain medicines, iron supplements, antacids, certain antidepressants, and calcium supplements    Diseases like irritable bowel syndrome, bowel obstructions, stroke, diabetes, thyroid disease, Parkinson disease, hemorrhoids, and colon cancer  Complications  Potential complications of constipation can include:    Hemorrhoids    Rectal bleeding from hemorrhoids or anal fissures (skin tears)    Hernias    Dependency on laxatives    Chronic constipation    Fecal impaction, a severe form of constipation in which a large amount of hard stool is in your rectum that you can't pass    Bowel obstruction or perforation  Home care  All treatment should be done after talking with your healthcare provider. This is especially true if you have another medical problems, are taking prescription medicines, or are an older adult. Treatment most often involves  lifestyle changes. You may also need medicines. Your healthcare provider will tell you which will work best for you. Follow the advice below to help avoid this problem in the future.  Lifestyle changes  These lifestyle changes can help prevent constipation:    Diet. Eat a high-fiber diet, with fresh fruit and vegetables, and reduce dairy intake, meats, and processed foods    Fluids. It's important to get enough fluids each day. Drink plenty of water when you eat more fiber. If you are on diet that limits the amount of fluid you can have, talk about this with your healthcare provider.    Regular exercise. Check with your healthcare provider first.  Medicines  Take any medicines as directed. Some laxatives are safe to use only every now and then. Others can be taken on a regular basis. While laxatives don't cause bowel dependence, they are treating the symptoms. So your constipation may return if you don't make other changes. Talk with your healthcare provider or pharmacist if you have questions.  Prescription pain medicines can cause constipation. If you are taking this kind of medicine, ask your healthcare provider if you should also take a stool softener.  Medicines you may take to treat constipation include:    Fiber supplements    Stool softeners    Laxatives    Enemas    Rectal suppositories  Follow-up care  Follow up with your healthcare provider if symptoms don't get better in the next few days. You may need to have more tests or see a specialist.  Call 911  Call 911 if any of these occur:    Trouble breathing    Stiff, rigid abdomen that is severely painful to touch    Confusion    Fainting or loss of consciousness    Rapid heart rate    Chest pain  When to seek medical advice  Call your healthcare provider right away if any of these occur:    Fever of 100.4 F (38 C) or higher, or as directed by your healthcare provider    Failure to resume normal bowel movements    Pain in your abdomen or back gets  worse    Nausea or vomiting    Swelling in your abdomen    Blood in the stool    Black, tarry stool    Involuntary weight loss    Weakness  Date Last Reviewed: 6/1/2018 2000-2019 [employer]. [address] [place] [address], [place] [address]. All rights reserved. This information is not intended as a substitute for professional medical care. Always follow your healthcare professional's instructions.           Patient Education     Lower Gastrointestinal (GI) Bleeding (Stable)  You have signs of blood in your stool. This is called rectal bleeding. The bleeding may have begun in another part of your gastrointestinal (GI) tract. If the blood is bright red, it is likely coming from the lower part of the GI tract. If the blood is black or dark, it might be coming from higher up in the GI tract. Very small amounts of GI bleeding may not be visible and can only be discovered during a test on your stool. Possible causes of lower GI bleeding include:    Swollen inflamed veins in the rectum (hemorrhoids)    Tear in the lining of the anus (anal fissures)    Inflammation of a small pouch in the intestine (diverticulitis)    Inflammatory bowel disease (Crohn's disease or ulcerative colitis)    Polyps (growths) in the intestine    Swelling and irritation of the colon (infectious colitis)    Colon cancer  Note: Iron supplements and medicines for diarrhea or upset stomach can cause black stools. Foods such as licorice and red beets can also discolor the stool and be mistaken for bleeding. These are not bleeding and are not a cause for alarm.  Home care  You have not lost a large amount of blood and your condition appears stable at this time. You may resume normal activity as long as you feel well.  Don't take NSAIDs, such as aspirin, ibuprofen, or naproxen. They can irritate the stomach and cause further bleeding. If you are taking these medicines for other medical reasons, talk to your healthcare provider before you stop  them.   Follow-up care  Follow up with your healthcare provider, or as advised. Further tests may be needed to find the cause of your bleeding.  When to seek medical advice  Call your healthcare provider right away for any of the following:    Large amount of rectal bleeding     Increasing abdominal pain    Weakness, dizziness  Call 911  Call 911 if any of the following occur:    Loss of consciousness    Vomiting blood  Date Last Reviewed: 3/1/2018    4994-4516 [employer]. [address] [place] [address], [place] [id number]. All rights reserved. This information is not intended as a substitute for professional medical care. Always follow your healthcare professional's instructions.           Patient Education     Lower Gastrointestinal (GI) Bleeding (Stable)  You have signs of blood in your stool. This is called rectal bleeding. The bleeding may have begun in another part of your gastrointestinal (GI) tract. If the blood is bright red, it is likely coming from the lower part of the GI tract. If the blood is black or dark, it might be coming from higher up in the GI tract. Very small amounts of GI bleeding may not be visible and can only be discovered during a test on your stool. Possible causes of lower GI bleeding include:    Swollen inflamed veins in the rectum (hemorrhoids)    Tear in the lining of the anus (anal fissures)    Inflammation of a small pouch in the intestine (diverticulitis)    Inflammatory bowel disease (Crohn's disease or ulcerative colitis)    Polyps (growths) in the intestine    Swelling and irritation of the colon (infectious colitis)    Colon cancer  Note: Iron supplements and medicines for diarrhea or upset stomach can cause black stools. Foods such as licorice and red beets can also discolor the stool and be mistaken for bleeding. These are not bleeding and are not a cause for alarm.  Home care  You have not lost a large amount of blood and your condition appears stable at this  time. You may resume normal activity as long as you feel well.  Don't take NSAIDs, such as aspirin, ibuprofen, or naproxen. They can irritate the stomach and cause further bleeding. If you are taking these medicines for other medical reasons, talk to your healthcare provider before you stop them.   Follow-up care  Follow up with your healthcare provider, or as advised. Further tests may be needed to find the cause of your bleeding.  When to seek medical advice  Call your healthcare provider right away for any of the following:    Large amount of rectal bleeding     Increasing abdominal pain    Weakness, dizziness  Call 911  Call 911 if any of the following occur:    Loss of consciousness    Vomiting blood  Date Last Reviewed: 3/1/2018    8214-7050 [employer]. [address] [place] [address], [place] [address]. All rights reserved. This information is not intended as a substitute for professional medical care. Always follow your healthcare professional's instructions.

## 2020-07-09 ENCOUNTER — TELEPHONE (OUTPATIENT)
Dept: FAMILY MEDICINE | Facility: CLINIC | Age: 75
End: 2020-07-09

## 2020-07-09 NOTE — TELEPHONE ENCOUNTER
Reason for Call:  Other patient request/referral    Detailed comments: Patient's daughter calling, states [facility] needs the referral faxed over to them before they can get patient scheduled for a colonoscopy.    Fax: [phone]  Ph: [phone]    Phone Number Patient can be reached at: Home number on file [phone] (home)    Best Time: anytime    Can we leave a detailed message on this number? NO    Call taken on 7/9/2020 at 9:16 AM by Los MEYER

## 2020-07-13 ENCOUNTER — TRANSFERRED RECORDS (OUTPATIENT)
Dept: HEALTH INFORMATION MANAGEMENT | Facility: CLINIC | Age: 75
End: 2020-07-13

## 2020-07-23 ENCOUNTER — TRANSFERRED RECORDS (OUTPATIENT)
Dept: HEALTH INFORMATION MANAGEMENT | Facility: CLINIC | Age: 75
End: 2020-07-23

## 2020-08-01 ENCOUNTER — MEDICAL CORRESPONDENCE (OUTPATIENT)
Dept: HEALTH INFORMATION MANAGEMENT | Facility: CLINIC | Age: 75
End: 2020-08-01

## 2020-08-16 DIAGNOSIS — E78.5 HYPERLIPIDEMIA WITH TARGET LDL LESS THAN 130: ICD-10-CM

## 2020-08-18 RX ORDER — SIMVASTATIN 20 MG
TABLET ORAL
Qty: 90 TABLET | Refills: 1 | Status: SHIPPED | OUTPATIENT
Start: 2020-08-18 | End: 2021-02-14

## 2020-08-18 NOTE — TELEPHONE ENCOUNTER
Prescription approved per [facility] Refill Protocol.    Nyla KAYE RN  EP Triage

## 2020-08-25 ENCOUNTER — TRANSFERRED RECORDS (OUTPATIENT)
Dept: HEALTH INFORMATION MANAGEMENT | Facility: CLINIC | Age: 75
End: 2020-08-25

## 2020-10-01 ENCOUNTER — OFFICE VISIT (OUTPATIENT)
Dept: FAMILY MEDICINE | Facility: CLINIC | Age: 75
End: 2020-10-01
Payer: COMMERCIAL

## 2020-10-01 VITALS
DIASTOLIC BLOOD PRESSURE: 90 MMHG | SYSTOLIC BLOOD PRESSURE: 150 MMHG | TEMPERATURE: 97.1 F | WEIGHT: 166 LBS | BODY MASS INDEX: 26.06 KG/M2 | HEART RATE: 77 BPM | HEIGHT: 67 IN | OXYGEN SATURATION: 96 %

## 2020-10-01 DIAGNOSIS — I10 ESSENTIAL HYPERTENSION: ICD-10-CM

## 2020-10-01 DIAGNOSIS — F43.0 ACUTE REACTION TO STRESS: ICD-10-CM

## 2020-10-01 DIAGNOSIS — Z00.00 ENCOUNTER FOR MEDICARE ANNUAL WELLNESS EXAM: Primary | ICD-10-CM

## 2020-10-01 DIAGNOSIS — K59.01 SLOW TRANSIT CONSTIPATION: ICD-10-CM

## 2020-10-01 DIAGNOSIS — Z12.5 SCREENING FOR PROSTATE CANCER: ICD-10-CM

## 2020-10-01 DIAGNOSIS — Z23 NEED FOR VACCINATION: ICD-10-CM

## 2020-10-01 LAB — HGB BLD-MCNC: 17.3 G/DL (ref 13.3–17.7)

## 2020-10-01 PROCEDURE — G0103 PSA SCREENING: HCPCS | Performed by: FAMILY MEDICINE

## 2020-10-01 PROCEDURE — G0009 ADMIN PNEUMOCOCCAL VACCINE: HCPCS | Performed by: FAMILY MEDICINE

## 2020-10-01 PROCEDURE — 85018 HEMOGLOBIN: CPT | Performed by: FAMILY MEDICINE

## 2020-10-01 PROCEDURE — 90732 PPSV23 VACC 2 YRS+ SUBQ/IM: CPT | Performed by: FAMILY MEDICINE

## 2020-10-01 PROCEDURE — 80048 BASIC METABOLIC PNL TOTAL CA: CPT | Performed by: FAMILY MEDICINE

## 2020-10-01 PROCEDURE — 80061 LIPID PANEL: CPT | Performed by: FAMILY MEDICINE

## 2020-10-01 PROCEDURE — 36415 COLL VENOUS BLD VENIPUNCTURE: CPT | Performed by: FAMILY MEDICINE

## 2020-10-01 PROCEDURE — 99214 OFFICE O/P EST MOD 30 MIN: CPT | Mod: 25 | Performed by: FAMILY MEDICINE

## 2020-10-01 PROCEDURE — 99397 PER PM REEVAL EST PAT 65+ YR: CPT | Mod: 25 | Performed by: FAMILY MEDICINE

## 2020-10-01 PROCEDURE — 84460 ALANINE AMINO (ALT) (SGPT): CPT | Performed by: FAMILY MEDICINE

## 2020-10-01 RX ORDER — SERTRALINE HYDROCHLORIDE 25 MG/1
25 TABLET, FILM COATED ORAL DAILY
Qty: 30 TABLET | Refills: 0 | Status: SHIPPED | OUTPATIENT
Start: 2020-10-01 | End: 2020-10-27

## 2020-10-01 RX ORDER — SENNOSIDES 8.6 MG
1 TABLET ORAL DAILY PRN
Qty: 30 TABLET | Refills: 3 | Status: SHIPPED | OUTPATIENT
Start: 2020-10-01 | End: 2022-12-01

## 2020-10-01 RX ORDER — LOSARTAN POTASSIUM 100 MG/1
100 TABLET ORAL DAILY
Qty: 90 TABLET | Refills: 1 | Status: SHIPPED | OUTPATIENT
Start: 2020-10-01 | End: 2021-04-01

## 2020-10-01 ASSESSMENT — MIFFLIN-ST. JEOR: SCORE: 1434.21

## 2020-10-01 NOTE — PATIENT INSTRUCTIONS
Patient Education   Personalized Prevention Plan  You are due for the preventive services outlined below.  Your care team is available to assist you in scheduling these services.  If you have already completed any of these items, please share that information with your care team to update in your medical record.  Health Maintenance Due   Topic Date Due     Pneumococcal Vaccine (1 of 1 - PPSV23) 04/18/2014     Flu Vaccine (1) 09/01/2020     Annual Wellness Visit  09/24/2020

## 2020-10-01 NOTE — PROGRESS NOTES
"  SUBJECTIVE:   Roselia العراقي is a 75 year old male who presents for Preventive Visit.      Patient has been advised of split billing requirements and indicates understanding:   Are you in the first 12 months of your Medicare Part B coverage?  No    Physical Health:    In general, how would you rate your overall physical health? good    Outside of work, how many days during the week do you exercise? 6-7 days/week    Outside of work, approximately how many minutes a day do you exercise?30-45 minutes    If you drink alcohol do you typically have >3 drinks per day or >7 drinks per week? No    Do you usually eat at least 4 servings of fruit and vegetables a day, include whole grains & fiber and avoid regularly eating high fat or \"junk\" foods? Yes    Do you have any problems taking medications regularly?  No    Do you have any side effects from medications? none    Needs assistance for the following daily activities: daughter and wife help with daily activties     Which of the following safety concerns are present in your home?  none identified     Hearing impairment: Yes, right ear     In the past 6 months, have you been bothered by leaking of urine? no    Mental Health:    In general, how would you rate your overall mental or emotional health? poor  PHQ-2 Score:      Do you feel safe in your environment? Yes    Have you ever done Advance Care Planning? (For example, a Health Directive, POLST, or a discussion with a medical provider or your loved ones about your wishes): Yes, advance care planning is on file.    Additional concerns to address?  No    Fall risk:       Cognitive Screenin) Repeat 3 items (Leader, Season, Table)    2) Clock draw: NORMAL  3) 3 item recall: Recalls 1 object   Results: NORMAL clock, 1-2 items recalled: COGNITIVE IMPAIRMENT LESS LIKELY    Mini-CogTM Copyright RIANA [employer]. Licensed by the author for use in [facility]; reprinted with permission (shane@.[facility]). All rights reserved.  "     No snoring         Reviewed and updated as needed this visit by clinical staff  Tobacco  Allergies  Meds              Reviewed and updated as needed this visit by Provider                Social History     Tobacco Use     Smoking status: Former Smoker     Packs/day: 0.30     Years: 20.00     Pack years: 6.00     Types: Cigarettes     Smokeless tobacco: Never Used   Substance Use Topics     Alcohol use: No     Alcohol/week: 0.0 standard drinks                           Current providers sharing in care for this patient include:   Patient Care Team:  David Turcios MD as PCP - General (Family Practice)  David Turcios MD as Assigned PCP  Chanda Pollack RN as Lead Care Coordinator (Primary Care - CC)    The following health maintenance items are reviewed in Epic and correct as of today:  Health Maintenance   Topic Date Due     Pneumococcal Vaccine: 65+ Years (1 of 1 - PPSV23) 04/18/2014     INFLUENZA VACCINE (1) 09/01/2020     MEDICARE ANNUAL WELLNESS VISIT  09/24/2020     FALL RISK ASSESSMENT  11/12/2020     ADVANCE CARE PLANNING  04/17/2022     LIPID  09/24/2024     COLORECTAL CANCER SCREENING  07/23/2025     DTAP/TDAP/TD IMMUNIZATION (3 - Td) 03/21/2029     PHQ-2  Completed     ZOSTER IMMUNIZATION  Completed     Pneumococcal Vaccine: Pediatrics (0 to 5 Years) and At-Risk Patients (6 to 64 Years)  Aged Out     IPV IMMUNIZATION  Aged Out     MENINGITIS IMMUNIZATION  Aged Out     HEPATITIS B IMMUNIZATION  Aged Out     BP Readings from Last 3 Encounters:   10/01/20 (!) 150/90   07/07/20 (!) 146/76   07/04/20 (!) 168/104    Wt Readings from Last 3 Encounters:   10/01/20 75.3 kg (166 lb)   07/07/20 74.8 kg (165 lb)   07/01/20 74.8 kg (165 lb)                  Patient Active Problem List   Diagnosis     Smoking     Dyspepsia     Varicose veins     Hyperlipidemia LDL goal <130     H. pylori infection     Vertigo, benign positional     Essential hypertension     Hyperlipidemia with target LDL less than 130     [facility]  Home     Advanced directives, counseling/discussion     Left knee pain     Benign neoplasm of colon     No past surgical history on file.    Social History     Tobacco Use     Smoking status: Former Smoker     Packs/day: 0.30     Years: 20.00     Pack years: 6.00     Types: Cigarettes     Smokeless tobacco: Never Used   Substance Use Topics     Alcohol use: No     Alcohol/week: 0.0 standard drinks     Family History   Problem Relation Age of Onset     Hypertension Father      Cerebrovascular Disease Father      Hypertension Brother          Current Outpatient Medications   Medication Sig Dispense Refill     calcium carbonate 500 MG tablet Take 1 tablet by mouth daily Client taking it twice a day 100 tablet 3     fish oil-omega-3 fatty acids 1000 MG capsule Take 2 capsules by mouth daily. 180 capsule 12     hydrocortisone, Perianal, (HYDROCORTISONE) 2.5 % cream Place rectally 2 times daily as needed for hemorrhoids 30 g 0     losartan (COZAAR) 100 MG tablet Take 1 tablet (100 mg) by mouth daily 90 tablet 1     losartan (COZAAR) 50 MG tablet Take 1 tablet (50 mg) by mouth daily 90 tablet 1     sennosides (SENOKOT) 8.6 MG tablet Take 1 tablet by mouth daily as needed for constipation 30 tablet 3     sertraline (ZOLOFT) 25 MG tablet Take 1 tablet (25 mg) by mouth daily 30 tablet 0     simvastatin (ZOCOR) 20 MG tablet TAKE 1 TABLET BY MOUTH AT BEDTIME 90 tablet 1     UNABLE TO FIND MEDICATION NAME: OTC -Red Yeast Rice 1200 mg, 1 tablet twice daily       UNABLE TO FIND MEDICATION NAME: PreserVision AREDS 2 formula soft gel. 1 soft gel twice daily       Allergies   Allergen Reactions     Penicillins      Sulfa Drugs Hives     Sulphasomidine      Tetracycline      Recent Labs   Lab Test 02/25/20  1002 09/24/19  1036 03/21/19  1051 01/16/18  1020 10/03/13  1110 10/03/13  1110 08/09/12  1446 08/09/12  1446   LDL 77 58 98 45   < >  --    < >  --    HDL  --  37* 28* 44   < >  --    < >  --    TRIG  --  223* 381* 221*   < >  --   "  < >  --    ALT 46 32 37 23   < > 74*   < > 66   CR 0.86 0.86 0.89 0.81   < > 1.05   < > 0.99   GFRESTIMATED 84 85 84 >90   < > 70   < > 75   GFRESTBLACK >90 >90 >90 >90   < > 85   < > >90   POTASSIUM 3.9 4.3 4.2 4.3   < > 3.6   < > 4.3   TSH  --   --   --   --   --  2.04  --  1.82    < > = values in this interval not displayed.      Pneumonia Vaccine:Adults age 65+ who received Pneumovax (PPSV23) at 65 years or older: Should be given PCV13 > 1 year after their most recent PPSV23    ROS:  Constitutional, HEENT, cardiovascular, pulmonary, gi and gu systems are negative, except as otherwise noted.    OBJECTIVE:   BP (!) 150/90   Pulse 77   Temp 97.1  F (36.2  C) (Tympanic)   Ht 1.69 m (5' 6.54\")   Wt 75.3 kg (166 lb)   SpO2 96%   BMI 26.36 kg/m   Estimated body mass index is 26.36 kg/m  as calculated from the following:    Height as of this encounter: 1.69 m (5' 6.54\").    Weight as of this encounter: 75.3 kg (166 lb).  EXAM:   GENERAL: healthy, alert and no distress  EYES: Eyes grossly normal to inspection, PERRL and conjunctivae and sclerae normal  HENT: ear canals and TM's normal, nose and mouth without ulcers or lesions  NECK: no adenopathy, no asymmetry, masses, or scars and thyroid normal to palpation  RESP: lungs clear to auscultation - no rales, rhonchi or wheezes  CV: regular rate and rhythm, normal S1 S2, no S3 or S4, no murmur, click or rub, no peripheral edema and peripheral pulses strong  ABDOMEN: soft, nontender, no hepatosplenomegaly, no masses and bowel sounds normal  MS: no gross musculoskeletal defects noted, no edema  SKIN: no suspicious lesions or rashes  NEURO: Normal strength and tone, mentation intact and speech normal  BACK: no CVA tenderness, no paralumbar tenderness  PSYCH: mentation appears normal, affect normal/bright  LYMPH: no cervical, supraclavicular, axillary, or inguinal adenopathy        ASSESSMENT / PLAN:   1. Need for vaccination    - Pneumococcal vaccine 23 valent PPSV23  " "(Pneumovax) [38424]  - 1st  Administration  [07239]    2. Encounter for Medicare annual wellness exam    - Hemoglobin  - **Basic metabolic panel FUTURE anytime  - ALT  - Lipid panel reflex to direct LDL Fasting  - PSA, screen    3. Screening for prostate cancer    - PSA, screen    4. Acute reaction to stress  Worsening with sibling and relatives death, has no HI/SI, will have him to try selective serotonin reuptake inhibitor and recheck in 1 month   - sertraline (ZOLOFT) 25 MG tablet; Take 1 tablet (25 mg) by mouth daily  Dispense: 30 tablet; Refill: 0  - OFFICE/OUTPT VISIT,ESTLEVL III    5. Essential hypertension  worsening with no other clinical sx, will have him to increase the dose of losartan to 100mg   - losartan (COZAAR) 100 MG tablet; Take 1 tablet (100 mg) by mouth daily  Dispense: 90 tablet; Refill: 1    6. Slow transit constipation  Worsening, will have him to try senna   - sennosides (SENOKOT) 8.6 MG tablet; Take 1 tablet by mouth daily as needed for constipation  Dispense: 30 tablet; Refill: 3    Patient has been advised of split billing requirements and indicates understanding: Yes    COUNSELING:  Reviewed preventive health counseling, as reflected in patient instructions    Estimated body mass index is 26.36 kg/m  as calculated from the following:    Height as of this encounter: 1.69 m (5' 6.54\").    Weight as of this encounter: 75.3 kg (166 lb).        He reports that he has quit smoking. His smoking use included cigarettes. He has a 6.00 pack-year smoking history. He has never used smokeless tobacco.    Appropriate preventive services were discussed with this patient, including applicable screening as appropriate for cardiovascular disease, diabetes, osteopenia/osteoporosis, and glaucoma.  As appropriate for age/gender, discussed screening for colorectal cancer, prostate cancer, breast cancer, and cervical cancer. Checklist reviewing preventive services available has been given to the " patient.    Reviewed patients plan of care and provided an AVS. The Basic Care Plan (routine screening as documented in Health Maintenance) for [place] meets the Care Plan requirement. This Care Plan has been established and reviewed with the Patient.    Counseling Resources:  ATP IV Guidelines  Pooled Cohorts Equation Calculator  Breast Cancer Risk Calculator  BRCA-Related Cancer Risk Assessment: FHS-7 Tool  FRAX Risk Assessment  ICSI Preventive Guidelines  Dietary Guidelines for Americans, 2010  [employer]'s MyPlate  ASA Prophylaxis  Lung CA Screening    David Turcios MD  [facility]

## 2020-10-02 LAB
ALT SERPL W P-5'-P-CCNC: 41 U/L (ref 0–70)
ANION GAP SERPL CALCULATED.3IONS-SCNC: 5 MMOL/L (ref 3–14)
BUN SERPL-MCNC: 15 MG/DL (ref 7–30)
CALCIUM SERPL-MCNC: 9.3 MG/DL (ref 8.5–10.1)
CHLORIDE SERPL-SCNC: 104 MMOL/L (ref 94–109)
CHOLEST SERPL-MCNC: 139 MG/DL
CO2 SERPL-SCNC: 28 MMOL/L (ref 20–32)
CREAT SERPL-MCNC: 0.97 MG/DL (ref 0.66–1.25)
GFR SERPL CREATININE-BSD FRML MDRD: 75 ML/MIN/{1.73_M2}
GLUCOSE SERPL-MCNC: 97 MG/DL (ref 70–99)
HDLC SERPL-MCNC: 41 MG/DL
LDLC SERPL CALC-MCNC: 57 MG/DL
NONHDLC SERPL-MCNC: 98 MG/DL
POTASSIUM SERPL-SCNC: 4.4 MMOL/L (ref 3.4–5.3)
PSA SERPL-ACNC: 2.58 UG/L (ref 0–4)
SODIUM SERPL-SCNC: 137 MMOL/L (ref 133–144)
TRIGL SERPL-MCNC: 205 MG/DL

## 2020-10-27 DIAGNOSIS — F43.0 ACUTE REACTION TO STRESS: ICD-10-CM

## 2020-10-27 RX ORDER — SERTRALINE HYDROCHLORIDE 25 MG/1
TABLET, FILM COATED ORAL
Qty: 30 TABLET | Refills: 0 | Status: SHIPPED | OUTPATIENT
Start: 2020-10-27 | End: 2020-12-01

## 2020-10-28 ENCOUNTER — PATIENT OUTREACH (OUTPATIENT)
Dept: GERIATRIC MEDICINE | Facility: CLINIC | Age: 75
End: 2020-10-28

## 2020-10-28 ASSESSMENT — PATIENT HEALTH QUESTIONNAIRE - PHQ9: SUM OF ALL RESPONSES TO PHQ QUESTIONS 1-9: 9

## 2020-10-28 NOTE — PROGRESS NOTES
[facility] Care Coordination Contact    [facility] Home Visit Assessment     Home visit for Health Risk Assessment with Roselia العراقي completed on October 28, 2020. Visit was done via telephone due to COVID 19 pandemic and restrictions on home visits at this time.     Type of residence:: Apartment - handicap accessible  Current living arrangement:: I live in an apartment with spouse(Due to COVID 19, [place] and wife staying at daughter's home)     Assessment completed with:: Member, Daughter Emilee, and Mandarin  Renetta from [facility].    Current Care Plan  Member currently receiving the following home care services:     Member currently receiving the following community resources: Day Care, PCA, Housekeeping/Chore Agency, Transportation Services    Medication Review  Medication reconciliation completed in Epic: Yes  Medication set-up & administration: Family/informal caregiver sets up weekly.  Family remind member to take meds.  Medication Risk Assessment Medication (1 or more, place referral to MTM): N/A: No risk factors identified  MTM Referral Placed: No, member declined.    Mental/Behavioral Health   Depression Screening:   PHQ-9 Total Score: 9. New diagnosis of Acute reaction to stress. Member reports having 5 friends die of COVID 19.  Mental health DX:: Yes   Mental health DX how managed:: Medication    Falls Assessment:   Fallen 2 or more times in the past year?: Yes   Any fall with injury in the past year?: No    ADL/IADL Dependencies:   Dependent ADLs:: Bathing, Dressing, Grooming  Dependent IADLs:: Cleaning, Cooking, Laundry, Shopping, Meal Preparation, Medication Management, Money Management, Transportation    [facility] Plan sponsored benefits: Shared information re: Silver Sneakers/gym memberships, ASA, Calcium +D.    PCA Assessment completed at visit: Yes Annual PCA assessment indicated 5 units per day of PCA. This is the same as the previous assessment.     Elderly  Waiver Eligibility: Yes-will continue on EW    Care Plan & Recommendations: Continue PCA, homemaking ADC either virtually or in person once goes back to ADC again.    See [facility]CC for detailed assessment information.    Follow-Up Plan: Member informed of future contact, plan to f/u with member with a 6 month telephone assessment.  Contact information shared with member and family, encouraged member to call with any questions or concerns at any time.    [facility] care continuum providers: Please refer to Health Care Home on the Epic Problem List to view this patient's [facility] Care Plan Summary.  Chanda Pollack RN, PHN, [facility] Care Coordination  [phone]. [phone]

## 2020-10-29 ENCOUNTER — PATIENT OUTREACH (OUTPATIENT)
Dept: GERIATRIC MEDICINE | Facility: CLINIC | Age: 75
End: 2020-10-29

## 2020-10-29 NOTE — PROGRESS NOTES
[facility] Care Coordination Contact    MMIS completed and entered. Member remains a rate cell B. CPS updated. PCA assessment completed showing member remains at 5 units of PCA daily. POC completed. Chart to CMS for processing.   Chanda Pollack RN, PHN, [facility] Care Coordination  [phone]. [phone]

## 2020-10-30 ENCOUNTER — PATIENT OUTREACH (OUTPATIENT)
Dept: GERIATRIC MEDICINE | Facility: CLINIC | Age: 75
End: 2020-10-30

## 2020-10-30 NOTE — LETTER
October 30, 2020    Important [employer] Information    MADY NICHOLE  [address]   [place] [address]  Your Care Plan  Dear Mady,  When we spoke recently, I promised to send you a Care Plan. The plan enclosed is a summary of our discussion. It includes the steps we agreed would help you meet your health goals. In addition, I can help you with:  Vbzlqay-X-FpocQM  This program is available to members who need a ride to medical and dental visits. To schedule a ride, call [phone] or [phone] (toll free). TTY/TTD: 711. You can call 8 a.m. to 8 p.m. Seven days a week. Access to a representative may be limited at times.    Red Condor   The Red Condor program empowers you to improve your health through education and exercise. To learn more, visit [employer], or call [employer] at [phone] (toll free) (TTY:711) from 7 a.m. - 7 p.m. Central Time, Monday-Friday.  Health Care Directive   This form helps you outline your health care wishes. You can request a form from me and I will answer any questions you have before you discuss it with your doctor.   Annual Physical  Take a key step on your path to good health and set up an annual physical at your clinic.  Questions?  Call me at [phone] Monday-Friday between 8am and 5pm.  TTY/TTD: 711. As we discussed, I plan to be in touch with you again in 6 months to follow up via phone.  Sincerely,    Chanda Pollack RN    E-mail: [email or web address]  Phone: [phone]      Blip          cc: member records                    Civil Rights Notice  Discrimination is against the law. [employer] does not discriminate on the basis of any of the following:    Race    Color    National Origin    Creed    Temple    Age    Public Assistance Status    Receipt of Health Care Services    Disability (including physical or mental impairment)    Sex (including sex stereotypes and gender identity)    Marital  Status    Political Beliefs    Medical Condition    Genetic Information    Sexual Orientation    Claims Experience    Medical History    Health Status    Auxiliary Aids and Services:  [employer] provides auxiliary aids and services, like qualified interpreters or information in accessible formats, free of charge and in a timely manner, to ensure an equal opportunity to participate in our health care programs. Contact [employer] Customer Service at [employer]/contact medicaid or call [phone] (toll free); TTY:711 or at [employer]/contactmedicaid.    Language Assistance Services:  [employer] provides translated documents and spoken language interpreting, free of charge and in a timely manner, when language assistance services are necessary to ensure limited English speakers have meaningful access to our information and services. Contact [employer] at [phone] (toll free); TTY: 789 or [employer]/contactmedicaid.     Civil Rights Complaints  You have the right to file a discrimination complaint if you believe you were treated in a discriminatory way by [employer]. You may contact any of the following four agencies directly to file a discrimination complaint.    U.S. Department of Health and Human Services  Office for Civil Rights (OCR)  You have the right to file a complaint with the OCR, a federal agency, if you believe you have been discriminated against because of any of the following:    Race    Disability    Color    Sex    National Origin    Age      Contact the OCR directly to file a complaint:         Director         U.S. Department of Health and Human Services  Office for Civil Rights         [address] [place] [address], [place] [address]         [phone] (Voice)         [phone] (TDD)         Complaint Portal - https://ocrportal.hhs.gov/ocr/portal/lobby.jsf     [place] Department of Human Rights ([facility])  In [place], you have the right to file a complaint with  the MDHR if you believe you have been discriminated against because of any of the following:      Race    Color    National Origin    Evangelical    Creed    Sex    Sexual Orientation    Marital Status    Public Assistance Status    Disability    Contact the MDHR directly to file a complaint:         [place] Department of Human Rights         [facility], [address] [place] [address]         [phone] (voice)          [phone] (toll free)         711 or [phone] ([place] Relay)         [phone] (Fax)         [email or web address] (Email)     [place] Department of Human Services (DHS)  You have the right to file a complaint with [facility] if you believe you have been discriminated against in our health care programs because of any of the following:    Race    Color    National Origin    Creed    Evangelical    Age    Public Assistance Status    Receipt of Health Care Services    Disability (including physical or mental impairment)    Sex (including sex stereotypes and gender identity)    Marital Status    Political Beliefs    Medical Condition    Genetic Information    Sexual Orientation    Claims Experience    Medical History    Health Status    Complaints must be in writing and filed within 180 days of the date you discovered the alleged discrimination. The complaint must contain your name and address and describe the discrimination you are complaining about. After we get your complaint, we will review it and notify you in writing about whether we have authority to investigate. If we do, we will investigate the complaint.      [facility] will notify you in writing of the investigation s outcome. You have a right to appeal the outcome if you disagree with the decision. To appeal, you must send a written request to have [facility] review the investigation outcome period. Be brief and state why you disagree with the decision. Include additional information you think is important.      If you file a  complaint in this way, the people who work for the agency named in the complaint cannot retaliate against you. This means they cannot punish you in any way for filing a complaint. Filing a complaint in this way does not stop you from seeking out other legal or administration actions.     Contact DHS directly to file a discrimination complaint:        Civil Rights Coordinator        [facility]        Equal Opportunity and Access Division        [address] [address]        [place] [address] [phone] (voice) or use your preferred relay service     [facility] Complaint Notice   You have the right to file a complaint with [employer] if you believe you have been discriminated against because of any of the following:       Medical condition    Health status    Receipt of health care services    Claims experience    Medical history    Genetic information    Disability (including mental or physical impairment)    Marital status    Age    Sex (including sex stereotypes and gender identity)    Sexual orientation    National origin    Race    Color    Anabaptist    Creed    Public assistance status    Political beliefs    You can file a complaint and ask for help in filing a complaint in person or by mail, phone, fax, or email at:     [facility] Civil Rights Coordinator  [employer] [facility]  [address], [place] [address] [phone] (voice and fax) or TTI:800  Email: [email or web address]    American Indians can continue to use [facility] and [facility] ([facility]) clinics. We will not require prior approval or impose any conditions for you to get services at these clinics. For elders age 65 years and older this includes Elderly Waiver (EW) services accessed through the [place]. If a doctor or other provider in a [facility] or [facility] clinic refers you to a provider in our network, we will not require you to see your primary care provider prior to the referral.    For  accessible formats of this publication or assistance with equal or access to our services, visit [email or web address]/contactmedicaid, or call [phone] (toll free) or use your preferred relay service.

## 2020-10-30 NOTE — PROGRESS NOTES
[facility] Care Coordination Contact    Received after visit chart from care coordinator.  Completed following tasks: Mailed copy of care plan to client, Updated services in access and Submitted referrals/auths for ADC and homemaking.  , Provider Signature - No POC Shared:  Member indicates that they do not want their POC shared with any EW providers.     and [facility]:  Faxed completed PCA assessment to PCA Agency and mailed copies to member.  Faxed MD Communication to PCP.  Emailed referral form for auth to [employer].    Member was mailed copies of the POC and PCA signature sheets to sign and return mail with a SASE.  This assessment was completed telephonically due to Covid-19.    Chelsea Bejarano  Care Management Specialist  [facility]  [phone]

## 2020-12-01 DIAGNOSIS — F43.0 ACUTE REACTION TO STRESS: ICD-10-CM

## 2020-12-01 RX ORDER — SERTRALINE HYDROCHLORIDE 25 MG/1
TABLET, FILM COATED ORAL
Qty: 90 TABLET | Refills: 1 | Status: SHIPPED | OUTPATIENT
Start: 2020-12-01 | End: 2021-05-26

## 2020-12-16 ENCOUNTER — MEDICAL CORRESPONDENCE (OUTPATIENT)
Dept: HEALTH INFORMATION MANAGEMENT | Facility: CLINIC | Age: 75
End: 2020-12-16

## 2021-01-14 DIAGNOSIS — Z53.9 DIAGNOSIS NOT YET DEFINED: Primary | ICD-10-CM

## 2021-01-14 PROCEDURE — G0179 MD RECERTIFICATION HHA PT: HCPCS | Performed by: FAMILY MEDICINE

## 2021-01-15 ENCOUNTER — TELEPHONE (OUTPATIENT)
Dept: FAMILY MEDICINE | Facility: CLINIC | Age: 76
End: 2021-01-15

## 2021-01-15 NOTE — TELEPHONE ENCOUNTER
MED REC DONE     Discrepancies:        Meds on Epic but NOT  on Form:      Hydrocortisone perianal 2.5 % cream:  Place rectally 2 time daily as needed for hemorrhoids    Sennosides 8.6 mg tablet: 10/1/20 take 1 tablet by mouth daily as needed for constipation    Sertraline 25 mg tablet: 12/1/20 take 1 tablet by mouth every day          Meds on Form but NOT on Epic :    Amino acid-multivit with iron-min capsule vitamin: take 1 tablet daily    Aspirin 81 mg: take 1 tab daily    Clotrimazole cream 1%:  Apply 2 times a day externally for fungal infection    Clotrimazole betamethasone lotion 1-0.05%:  Apply 2x/day externally for fungal infection    Naproxen 50 mg tablet: take 1 tab twice daily by mouth for pain    Terbinafine 250 mg tablet: 1 tab by mouth daily      Routing to PCP for further review/recommendations/orders    Nyla KAYE RN  EP Triage

## 2021-01-17 NOTE — TELEPHONE ENCOUNTER
Meds on Epic but NOT  on Form:            Sennosides 8.6 mg tablet: 10/1/20 take 1 tablet by mouth daily as needed for constipation     Sertraline 25 mg tablet: 12/1/20 take 1 tablet by mouth every day              Meds on Form but NOT on Epic :          Aspirin 81 mg: take 1 tab daily

## 2021-01-18 NOTE — TELEPHONE ENCOUNTER
Home Health Certification completed and  faxed back to [facility] and sent to [facility].  Lissy Panchal,

## 2021-02-08 ENCOUNTER — IMMUNIZATION (OUTPATIENT)
Dept: PEDIATRICS | Facility: CLINIC | Age: 76
End: 2021-02-08
Payer: COMMERCIAL

## 2021-02-08 PROCEDURE — 91300 PR COVID VAC PFIZER DIL RECON 30 MCG/0.3 ML IM: CPT

## 2021-02-08 PROCEDURE — 0001A PR COVID VAC PFIZER DIL RECON 30 MCG/0.3 ML IM: CPT

## 2021-02-14 DIAGNOSIS — E78.5 HYPERLIPIDEMIA WITH TARGET LDL LESS THAN 130: ICD-10-CM

## 2021-02-14 RX ORDER — SIMVASTATIN 20 MG
TABLET ORAL
Qty: 90 TABLET | Refills: 1 | Status: SHIPPED | OUTPATIENT
Start: 2021-02-14 | End: 2021-08-23

## 2021-03-01 ENCOUNTER — IMMUNIZATION (OUTPATIENT)
Dept: PEDIATRICS | Facility: CLINIC | Age: 76
End: 2021-03-01
Attending: INTERNAL MEDICINE
Payer: COMMERCIAL

## 2021-03-01 PROCEDURE — 91300 PR COVID VAC PFIZER DIL RECON 30 MCG/0.3 ML IM: CPT

## 2021-03-01 PROCEDURE — 0002A PR COVID VAC PFIZER DIL RECON 30 MCG/0.3 ML IM: CPT

## 2021-04-01 DIAGNOSIS — I10 ESSENTIAL HYPERTENSION: ICD-10-CM

## 2021-04-01 RX ORDER — LOSARTAN POTASSIUM 100 MG/1
TABLET ORAL
Qty: 30 TABLET | Refills: 0 | Status: SHIPPED | OUTPATIENT
Start: 2021-04-01 | End: 2021-04-09

## 2021-04-01 NOTE — TELEPHONE ENCOUNTER
Called and spoke with the patients daughter, and we scheduled a physical for 04/09 with Dr. Turcios.    Kajal Humphrey on 4/1/2021 at 9:46 AM

## 2021-04-01 NOTE — TELEPHONE ENCOUNTER
Routing refill request to provider for review/approval because:  Failed BP Protocol    Nyla KAYE RN  EP Triage

## 2021-04-09 ENCOUNTER — OFFICE VISIT (OUTPATIENT)
Dept: FAMILY MEDICINE | Facility: CLINIC | Age: 76
End: 2021-04-09
Payer: COMMERCIAL

## 2021-04-09 VITALS
OXYGEN SATURATION: 96 % | TEMPERATURE: 96.8 F | BODY MASS INDEX: 27.15 KG/M2 | HEART RATE: 71 BPM | WEIGHT: 173 LBS | DIASTOLIC BLOOD PRESSURE: 90 MMHG | RESPIRATION RATE: 16 BRPM | HEIGHT: 67 IN | SYSTOLIC BLOOD PRESSURE: 162 MMHG

## 2021-04-09 DIAGNOSIS — E78.5 HYPERLIPIDEMIA WITH TARGET LDL LESS THAN 130: ICD-10-CM

## 2021-04-09 DIAGNOSIS — I10 ESSENTIAL HYPERTENSION: Primary | ICD-10-CM

## 2021-04-09 LAB
ALT SERPL W P-5'-P-CCNC: 59 U/L (ref 0–70)
ANION GAP SERPL CALCULATED.3IONS-SCNC: 4 MMOL/L (ref 3–14)
BUN SERPL-MCNC: 14 MG/DL (ref 7–30)
CALCIUM SERPL-MCNC: 8.5 MG/DL (ref 8.5–10.1)
CHLORIDE SERPL-SCNC: 103 MMOL/L (ref 94–109)
CO2 SERPL-SCNC: 28 MMOL/L (ref 20–32)
CREAT SERPL-MCNC: 0.88 MG/DL (ref 0.66–1.25)
GFR SERPL CREATININE-BSD FRML MDRD: 83 ML/MIN/{1.73_M2}
GLUCOSE SERPL-MCNC: 91 MG/DL (ref 70–99)
LDLC SERPL DIRECT ASSAY-MCNC: 128 MG/DL
POTASSIUM SERPL-SCNC: 4 MMOL/L (ref 3.4–5.3)
SODIUM SERPL-SCNC: 135 MMOL/L (ref 133–144)

## 2021-04-09 PROCEDURE — 84460 ALANINE AMINO (ALT) (SGPT): CPT | Performed by: FAMILY MEDICINE

## 2021-04-09 PROCEDURE — 80048 BASIC METABOLIC PNL TOTAL CA: CPT | Performed by: FAMILY MEDICINE

## 2021-04-09 PROCEDURE — 83721 ASSAY OF BLOOD LIPOPROTEIN: CPT | Performed by: FAMILY MEDICINE

## 2021-04-09 PROCEDURE — 99214 OFFICE O/P EST MOD 30 MIN: CPT | Performed by: FAMILY MEDICINE

## 2021-04-09 PROCEDURE — 36415 COLL VENOUS BLD VENIPUNCTURE: CPT | Performed by: FAMILY MEDICINE

## 2021-04-09 RX ORDER — CHLORTHALIDONE 25 MG/1
25 TABLET ORAL DAILY
Qty: 90 TABLET | Refills: 0 | Status: SHIPPED | OUTPATIENT
Start: 2021-04-09 | End: 2021-07-05

## 2021-04-09 RX ORDER — LOSARTAN POTASSIUM 100 MG/1
100 TABLET ORAL DAILY
Qty: 90 TABLET | Refills: 1 | Status: SHIPPED | OUTPATIENT
Start: 2021-04-09 | End: 2021-09-30

## 2021-04-09 ASSESSMENT — MIFFLIN-ST. JEOR: SCORE: 1460.96

## 2021-04-27 ENCOUNTER — PATIENT OUTREACH (OUTPATIENT)
Dept: GERIATRIC MEDICINE | Facility: CLINIC | Age: 76
End: 2021-04-27

## 2021-04-27 NOTE — PROGRESS NOTES
[facility] Care Coordination Contact      [facility] Six-Month Telephone Assessment    6 month telephone assessment completed on 4/27/21.    ER visits: Yes -  7/420 for rectal bleeding  Hospitalizations: No  TCU stays: No  Significant health status changes: none  Falls/Injuries: No  ADL/IADL changes: No  Changes in services: No    Caregiver Assessment follow up:  na    Goals: See POC in chart for goal progress documentation.  Still attending ADC online.    Will see member in 6 months for an annual health risk assessment.   Encouraged member to call CC with any questions or concerns in the meantime.   Chanda Pollack RN, PHN, CCM  [facility] Care Coordination  [phone]. [phone]

## 2021-05-26 DIAGNOSIS — F43.0 ACUTE REACTION TO STRESS: ICD-10-CM

## 2021-05-26 RX ORDER — SERTRALINE HYDROCHLORIDE 25 MG/1
TABLET, FILM COATED ORAL
Qty: 90 TABLET | Refills: 1 | Status: SHIPPED | OUTPATIENT
Start: 2021-05-26 | End: 2022-12-01

## 2021-05-27 ENCOUNTER — NURSE TRIAGE (OUTPATIENT)
Dept: FAMILY MEDICINE | Facility: CLINIC | Age: 76
End: 2021-05-27

## 2021-05-27 ENCOUNTER — OFFICE VISIT (OUTPATIENT)
Dept: FAMILY MEDICINE | Facility: CLINIC | Age: 76
End: 2021-05-27
Payer: COMMERCIAL

## 2021-05-27 VITALS
WEIGHT: 171 LBS | HEART RATE: 67 BPM | OXYGEN SATURATION: 95 % | BODY MASS INDEX: 27.16 KG/M2 | DIASTOLIC BLOOD PRESSURE: 70 MMHG | SYSTOLIC BLOOD PRESSURE: 130 MMHG | TEMPERATURE: 96.4 F

## 2021-05-27 DIAGNOSIS — I10 ESSENTIAL HYPERTENSION: ICD-10-CM

## 2021-05-27 DIAGNOSIS — L23.9 DERMAL HYPERSENSITIVITY REACTION: Primary | ICD-10-CM

## 2021-05-27 PROCEDURE — 99214 OFFICE O/P EST MOD 30 MIN: CPT | Performed by: FAMILY MEDICINE

## 2021-05-27 RX ORDER — CETIRIZINE HYDROCHLORIDE 10 MG/1
10 TABLET ORAL DAILY
Qty: 30 TABLET | Refills: 0 | Status: SHIPPED | OUTPATIENT
Start: 2021-05-27 | End: 2021-06-21

## 2021-05-27 ASSESSMENT — PAIN SCALES - GENERAL: PAINLEVEL: NO PAIN (0)

## 2021-05-27 NOTE — PROGRESS NOTES
Assessment & Plan     Dermal hypersensitivity reaction  Worsening with weather change, however it is unclear if related with new BP meds(CHlorthalidone), will have him to start antihistamine for next 4 weeks, and consider stopping if improving    - cetirizine (ZYRTEC) 10 MG tablet; Take 1 tablet (10 mg) by mouth daily    Essential hypertension  Stable after starting chlorthalidone, will have him to keep monitoring with adding antihistamine for next 4 weeks              FUTURE APPOINTMENTS:       - Follow-up in 3 months for BP    No follow-ups on file.    David Turcios MD  [facility] SUNI Veloz is a 76 year old who presents for the following health issues     HPI     Rash  Onset/Duration:  2 weeks   Description  Location: hands, back, arms and legs   Character: blotchy, red  Itching: moderate   Intensity:  moderate  Progression of Symptoms:  improving  Accompanying signs and symptoms:   Fever: no  Body aches or joint pain: no  Sore throat symptoms: no  Recent cold symptoms: no  History:           Previous episodes of similar rash: None  New exposures:   medication chorthalidone   Recent travel: no  Exposure to similar rash: no  Precipitating or alleviating factors:   Therapies tried and outcome: body cream         Review of Systems   Constitutional, HEENT, cardiovascular, pulmonary, gi and gu systems are negative, except as otherwise noted.      Objective    /70   Pulse 67   Temp 96.4  F (35.8  C) (Tympanic)   Wt 77.6 kg (171 lb)   SpO2 95%   BMI 27.16 kg/m    Body mass index is 27.16 kg/m .  Physical Exam   GENERAL: healthy, alert and no distress  NECK: no adenopathy, no asymmetry, masses, or scars and thyroid normal to palpation  RESP: lungs clear to auscultation - no rales, rhonchi or wheezes  CV: regular rate and rhythm, normal S1 S2, no S3 or S4, no murmur, click or rub, no peripheral edema and peripheral pulses strong  ABDOMEN: soft, nontender, no  hepatosplenomegaly, no masses and bowel sounds normal  MS: no gross musculoskeletal defects noted, no edema  SKIN: papular rashes on bilateral arm and neck,

## 2021-05-27 NOTE — TELEPHONE ENCOUNTER
"    Reason for Disposition    Hives or itching    Additional Information    Negative: [1] Life-threatening reaction (anaphylaxis) in the past to the same drug AND [2] < 2 hours since exposure    Negative: Difficulty breathing or wheezing    Negative: [1] Hoarseness or cough AND [2] started soon after 1st dose of drug    Negative: [1] Swollen tongue AND [2] started soon after 1st dose of drug    Negative: [1] Purple or blood-colored rash (spots or dots) AND [2] fever    Negative: Sounds like a life-threatening emergency to the triager    Negative: Rash is only on 1 part of the body (localized)    Negative: Taking new non-prescription (OTC) antihistamine, decongestant, ear drops, eye drops, or other OTC cough/cold medicine    Negative: Taking new prescription antihistamine, allergy medicine, asthma medicine, eye drops, ear drops or nose drops    Negative: Rash started more than 3 days after stopping new prescription medicine    Negative: Swollen tongue    Negative: [1] Widespread hives AND [2] onset < 2 hours of exposure to 1st dose of drug    Negative: Fever    Negative: Patient sounds very sick or weak to the triager    Negative: [1] Purple or blood-colored rash (spots or dots) AND [2] no fever AND [3] sounds well to triager    Negative: [1] Taking new prescription medication AND [2] rash within 4 hours of 1st dose    Negative: Large or small blisters on skin (i.e., fluid filled bubbles or sacs)    Negative: Bloody crusts on lips or sores in mouth    Negative: Face or lip swelling    Answer Assessment - Initial Assessment Questions  1. APPEARANCE of RASH: \"Describe the rash.\" (e.g., spots, blisters, raised areas, skin peeling, scaly)      Raised red rash  2. SIZE: \"How big are the spots?\" (e.g., tip of pen, eraser, coin; inches, centimeters)      Small   3. LOCATION: \"Where is the rash located?\"      Hands, neck, back, and legs  4. COLOR: \"What color is the rash?\" (Note: It is difficult to assess rash color in " "people with darker-colored skin. When this situation occurs, simply ask the caller to describe what they see.)      red  5. ONSET: \"When did the rash begin?\"      After starting chlorthalidone in April  6. FEVER: \"Do you have a fever?\" If so, ask: \"What is your temperature, how was it measured, and when did it start?\"      no  7. ITCHING: \"Does the rash itch?\" If so, ask: \"How bad is the itch?\" (Scale 1-10; or mild, moderate, severe)      Yes moderate  8. CAUSE: \"What do you think is causing the rash?\"      The medication.  Pt did not take medication yesterday and no itching  9. NEW MEDICATION: \"What new medication are you taking?\" (e.g., name of antibiotic) \"When did you start taking this medication?\".      Chlorthalidone started in April 2021  10. OTHER SYMPTOMS: \"Do you have any other symptoms?\" (e.g., sore throat, fever, joint pain)        No other sxs  11. PREGNANCY: \"Is there any chance you are pregnant?\" \"When was your last menstrual period?\"        NA    Protocols used: RASH - WIDESPREAD ON DRUGS-A-AH    SEE PCP WITHIN 24 HOURS:   * IF OFFICE WILL BE OPEN: You need to be seen within the next 24 hours. Call your doctor (or NP/PA) when the office opens and make an appointment.  * IF OFFICE WILL BE CLOSED AND NO PCP (PRIMARY CARE PROVIDER) SECOND-LEVEL TRIAGE: You need to be seen within the next 24 hours. A clinic or an urgent care center is often a good source of care if your doctor's office is closed or you can't get an appointment.  * IF OFFICE WILL BE CLOSED AND PCP SECOND-LEVEL TRIAGE REQUIRED: You may need to be seen within the next 24 hours. Your doctor (or NP/PA) will want to talk with you to decide what's best. I'll page the on-call provider now. NOTE: Since this isn't serious, hold the page between 10 pm and 7 am. Page the on-call provider in the morning.  * IF PATIENT HAS NO PCP: Refer patient to a clinic or urgent care center. Also try to help caller find a PCP for future care.      CARE ADVICE " given per Rash - Widespread on Drugs (Adult) guideline      Patient/Caregiver understands and will follow care advice? Yes, plans to follow advice     Scheduled with Dr. Turcios today at 12:40 pm.    Nyla KAYE RN  EP Triage

## 2021-06-19 DIAGNOSIS — L23.9 DERMAL HYPERSENSITIVITY REACTION: ICD-10-CM

## 2021-06-21 RX ORDER — CETIRIZINE HYDROCHLORIDE 10 MG/1
TABLET ORAL
Qty: 30 TABLET | Refills: 0 | Status: SHIPPED | OUTPATIENT
Start: 2021-06-21 | End: 2021-07-21

## 2021-06-21 NOTE — TELEPHONE ENCOUNTER
Routing refill request to provider for review/approval because:  Failed age requirement    Nyla KAYE RN  EP Triage

## 2021-07-03 DIAGNOSIS — I10 ESSENTIAL HYPERTENSION: ICD-10-CM

## 2021-07-03 DIAGNOSIS — E78.5 HYPERLIPIDEMIA WITH TARGET LDL LESS THAN 130: ICD-10-CM

## 2021-07-05 RX ORDER — CHLORTHALIDONE 25 MG/1
TABLET ORAL
Qty: 90 TABLET | Refills: 2 | Status: SHIPPED | OUTPATIENT
Start: 2021-07-05 | End: 2022-03-30

## 2021-07-21 ENCOUNTER — OFFICE VISIT (OUTPATIENT)
Dept: FAMILY MEDICINE | Facility: CLINIC | Age: 76
End: 2021-07-21
Payer: COMMERCIAL

## 2021-07-21 VITALS
HEART RATE: 79 BPM | TEMPERATURE: 98.2 F | WEIGHT: 172 LBS | RESPIRATION RATE: 10 BRPM | OXYGEN SATURATION: 95 % | DIASTOLIC BLOOD PRESSURE: 76 MMHG | HEIGHT: 67 IN | BODY MASS INDEX: 27 KG/M2 | SYSTOLIC BLOOD PRESSURE: 132 MMHG

## 2021-07-21 DIAGNOSIS — L23.9 DERMAL HYPERSENSITIVITY REACTION: ICD-10-CM

## 2021-07-21 DIAGNOSIS — E78.5 HYPERLIPIDEMIA WITH TARGET LDL LESS THAN 130: ICD-10-CM

## 2021-07-21 DIAGNOSIS — I10 ESSENTIAL HYPERTENSION: Primary | ICD-10-CM

## 2021-07-21 PROCEDURE — 99214 OFFICE O/P EST MOD 30 MIN: CPT | Performed by: FAMILY MEDICINE

## 2021-07-21 RX ORDER — CETIRIZINE HYDROCHLORIDE 10 MG/1
10 TABLET ORAL DAILY
Qty: 90 TABLET | Refills: 0 | Status: SHIPPED | OUTPATIENT
Start: 2021-07-21 | End: 2021-09-08

## 2021-07-21 ASSESSMENT — PAIN SCALES - GENERAL: PAINLEVEL: NO PAIN (0)

## 2021-07-21 ASSESSMENT — MIFFLIN-ST. JEOR: SCORE: 1456.43

## 2021-07-21 NOTE — PROGRESS NOTES
"    Assessment & Plan     Dermal hypersensitivity reaction  Stable, will have him to take cetrizine for next 6-8 weeks and consider weaning off of it   - cetirizine (ZYRTEC) 10 MG tablet; Take 1 tablet (10 mg) by mouth daily    Essential hypertension  Stable with current dose of meds, will keep monitoring with current regimen     Hyperlipidemia with target LDL less than 130  Stable, encouraged him to keep working on life style modification              FUTURE APPOINTMENTS:       - Follow-up visit in 3 months for CPE    No follow-ups on file.    David Turcios MD  [facility] SUNI Veloz is a 76 year old who presents for the following health issues     HPI     Patient is here regarding a medication follow up.    Patient wound like to discuss some skin concern. Itching on ear and arms. Lots of redness. Patient feels it may be related to a prescription he is taking, nothing changed in terms of soaps, detergents etc.         Review of Systems   Constitutional, HEENT, cardiovascular, pulmonary, gi and gu systems are negative, except as otherwise noted.      Objective    /76   Pulse 79   Temp 98.2  F (36.8  C) (Tympanic)   Resp 10   Ht 1.69 m (5' 6.54\")   Wt 78 kg (172 lb)   SpO2 95%   BMI 27.32 kg/m    Body mass index is 27.32 kg/m .  Physical Exam   GENERAL: healthy, alert and no distress  EYES: Eyes grossly normal to inspection, PERRL and conjunctivae and sclerae normal  HENT: ear canals and TM's normal, nose and mouth without ulcers or lesions  NECK: no adenopathy, no asymmetry, masses, or scars and thyroid normal to palpation  RESP: lungs clear to auscultation - no rales, rhonchi or wheezes  CV: regular rate and rhythm, normal S1 S2, no S3 or S4, no murmur, click or rub, no peripheral edema and peripheral pulses strong  ABDOMEN: soft, nontender, no hepatosplenomegaly, no masses and bowel sounds normal  MS: no gross musculoskeletal defects noted, no edema  SKIN: no " suspicious lesions or rashes  NEURO: Normal strength and tone, mentation intact and speech normal  BACK: no CVA tenderness, no paralumbar tenderness  PSYCH: mentation appears normal, affect normal/bright  LYMPH: no cervical, supraclavicular, axillary, or inguinal adenopathy

## 2021-08-20 DIAGNOSIS — E78.5 HYPERLIPIDEMIA WITH TARGET LDL LESS THAN 130: ICD-10-CM

## 2021-08-23 RX ORDER — SIMVASTATIN 20 MG
TABLET ORAL
Qty: 90 TABLET | Refills: 1 | Status: SHIPPED | OUTPATIENT
Start: 2021-08-23 | End: 2021-10-13

## 2021-09-02 ENCOUNTER — TELEPHONE (OUTPATIENT)
Dept: FAMILY MEDICINE | Facility: CLINIC | Age: 76
End: 2021-09-02

## 2021-09-03 NOTE — TELEPHONE ENCOUNTER
MED REC DONE     Discrepancies:      losartan (COZAAR)            Epic: Take 1 tablet (100 mg) by mouth daily - Oral           Form:  Take 50 mg tab daily       Meds on Epic but NOT  on Form:      -cetirizine (ZYRTEC) 10 MG tablet    -chlorthalidone (HYGROTON) 25 MG tablet    -hydrocortisone, Perianal, (HYDROCORTISONE) 2.5 % cream    -cetirizine (ZYRTEC) 10 MG tablet    -sennosides (SENOKOT) 8.6 MG tablet    -sertraline (ZOLOFT) 25 MG tablet          Meds on Form but NOT on Epic :    -Aspirin 81 mg  1 tab daily     -Clotrimazole Cream 1% apply twice daily     -Clotrimazole-betamethasone Lotion 1-0.05% apply      2x  daily     -Naproxen 50 mg Take 1 tablet twice daily      -Preservision AREDS 2 casule, [phone] mg   Take 1 tab daily     -Terbinafine 250 mg Take 1 tablet daily    Berenice Shah RN    Routing to PCP for further review/recommendations/orders

## 2021-09-06 NOTE — TELEPHONE ENCOUNTER
Discrepancies:       losartan (COZAAR)            Epic: Take 1 tablet (100 mg) by mouth daily - Oral                   Meds on Epic but NOT  on Form:       -cetirizine (ZYRTEC) 10 MG tablet     -chlorthalidone (HYGROTON) 25 MG tablet          -      -sennosides (SENOKOT) 8.6 MG tablet     -sertraline (ZOLOFT) 25 MG tablet              Meds on Form but NOT on Epic :     -Aspirin 81 mg  1 tab daily            -Clotrimazole-betamethasone Lotion 1-0.05% apply      2x  daily      -Naproxen 50 mg Take 1 tablet twice daily       -Preservision AREDS 2 casule, [phone] mg   Take 1 tab daily      -Terbinafine 250 mg Take 1 tablet daily

## 2021-09-08 RX ORDER — ANTIOX #8/OM3/DHA/EPA/LUT/ZEAX 250-2.5 MG
CAPSULE ORAL
COMMUNITY
Start: 2021-09-08

## 2021-09-08 RX ORDER — CLOTRIMAZOLE AND BETAMETHASONE DIPROPIONATE 10; .5 MG/ML; MG/ML
LOTION TOPICAL 2 TIMES DAILY
COMMUNITY
Start: 2021-09-08 | End: 2022-12-01

## 2021-09-08 RX ORDER — NAPROXEN 500 MG/1
500 TABLET ORAL 2 TIMES DAILY WITH MEALS
COMMUNITY
Start: 2021-09-08 | End: 2022-12-01

## 2021-09-08 RX ORDER — ASPIRIN 81 MG/1
81 TABLET, CHEWABLE ORAL DAILY
COMMUNITY
Start: 2021-09-08 | End: 2023-09-19

## 2021-09-08 RX ORDER — TERBINAFINE HYDROCHLORIDE 250 MG/1
250 TABLET ORAL 2 TIMES DAILY
Qty: 180 TABLET | Refills: 0 | COMMUNITY
Start: 2021-09-08 | End: 2022-12-01

## 2021-09-08 NOTE — TELEPHONE ENCOUNTER
On form Naproxen 50 mg BID.   Only available dose is Naproxen 500 mg.   Please verify larger dose is okay.     Epic updated. New med list printed. Placed on Dr. Turcios's desk.     Berenice Shah RN

## 2021-09-13 ENCOUNTER — TELEPHONE (OUTPATIENT)
Dept: FAMILY MEDICINE | Facility: CLINIC | Age: 76
End: 2021-09-13

## 2021-09-15 ENCOUNTER — PATIENT OUTREACH (OUTPATIENT)
Dept: GERIATRIC MEDICINE | Facility: CLINIC | Age: 76
End: 2021-09-15

## 2021-09-15 SDOH — ECONOMIC STABILITY: FOOD INSECURITY: WITHIN THE PAST 12 MONTHS, YOU WORRIED THAT YOUR FOOD WOULD RUN OUT BEFORE YOU GOT MONEY TO BUY MORE.: NEVER TRUE

## 2021-09-15 SDOH — HEALTH STABILITY: PHYSICAL HEALTH: ON AVERAGE, HOW MANY MINUTES DO YOU ENGAGE IN EXERCISE AT THIS LEVEL?: 30 MIN

## 2021-09-15 SDOH — ECONOMIC STABILITY: FOOD INSECURITY: WITHIN THE PAST 12 MONTHS, THE FOOD YOU BOUGHT JUST DIDN'T LAST AND YOU DIDN'T HAVE MONEY TO GET MORE.: NEVER TRUE

## 2021-09-15 SDOH — ECONOMIC STABILITY: INCOME INSECURITY: IN THE LAST 12 MONTHS, WAS THERE A TIME WHEN YOU WERE NOT ABLE TO PAY THE MORTGAGE OR RENT ON TIME?: NO

## 2021-09-15 SDOH — HEALTH STABILITY: PHYSICAL HEALTH: ON AVERAGE, HOW MANY DAYS PER WEEK DO YOU ENGAGE IN MODERATE TO STRENUOUS EXERCISE (LIKE A BRISK WALK)?: 7 DAYS

## 2021-09-15 SDOH — ECONOMIC STABILITY: TRANSPORTATION INSECURITY
IN THE PAST 12 MONTHS, HAS LACK OF TRANSPORTATION KEPT YOU FROM MEETINGS, WORK, OR FROM GETTING THINGS NEEDED FOR DAILY LIVING?: NO

## 2021-09-15 SDOH — ECONOMIC STABILITY: TRANSPORTATION INSECURITY
IN THE PAST 12 MONTHS, HAS THE LACK OF TRANSPORTATION KEPT YOU FROM MEDICAL APPOINTMENTS OR FROM GETTING MEDICATIONS?: NO

## 2021-09-15 ASSESSMENT — LIFESTYLE VARIABLES
HOW OFTEN DO YOU HAVE A DRINK CONTAINING ALCOHOL: NEVER
HOW OFTEN DO YOU HAVE SIX OR MORE DRINKS ON ONE OCCASION: NEVER

## 2021-09-15 ASSESSMENT — SOCIAL DETERMINANTS OF HEALTH (SDOH)
HOW OFTEN DO YOU ATTEND CHURCH OR RELIGIOUS SERVICES?: NEVER
HOW HARD IS IT FOR YOU TO PAY FOR THE VERY BASICS LIKE FOOD, HOUSING, MEDICAL CARE, AND HEATING?: NOT HARD AT ALL
IN A TYPICAL WEEK, HOW MANY TIMES DO YOU TALK ON THE PHONE WITH FAMILY, FRIENDS, OR NEIGHBORS?: MORE THAN THREE TIMES A WEEK
HOW OFTEN DO YOU ATTENT MEETINGS OF THE CLUB OR ORGANIZATION YOU BELONG TO?: NEVER
DO YOU BELONG TO ANY CLUBS OR ORGANIZATIONS SUCH AS CHURCH GROUPS UNIONS, FRATERNAL OR ATHLETIC GROUPS, OR SCHOOL GROUPS?: NO
HOW OFTEN DO YOU GET TOGETHER WITH FRIENDS OR RELATIVES?: ONCE A WEEK

## 2021-09-15 ASSESSMENT — PATIENT HEALTH QUESTIONNAIRE - PHQ9: SUM OF ALL RESPONSES TO PHQ QUESTIONS 1-9: 2

## 2021-09-15 NOTE — PROGRESS NOTES
[facility] Care Coordination Contact    [facility] Home Visit Assessment     Home visit for Health Risk Assessment with Roselia العراقي completed on September 15, 2021 via telephone due to COVID 19 and restrictions on home visits at this time.     Type of residence:: Apartment - handicap accessible  Current living arrangement:: I live in a private home with spouse (Member and spouse spending most days at daughter's due to COVID as do not want to be in apt building seeing others.)     Assessment completed with:: Patient, Children, Spouse or significant other, Other (daughter Emilee and  Andrade from [employer] [place])    Current Care Plan  Member currently receiving the following home care services:     Member currently receiving the following community resources: Day Care, Housekeeping/Chore Agency, PCA, Transportation Services    Medication Review:  Medication reconciliation completed in Epic: No-visit was done via telephone and not able to see all the med bottles.   Medication set-up & administration: Family/informal caregiver sets up weekly, and reminds member to take meds.  Medication Risk Assessment Medication: declines.    Mental/Behavioral Health   Depression Screening:   PHQ-9 Total Score: 2  Mental health DX:: Yes   Mental health DX how managed:: Medication. Declines counseling    Falls Assessment:   Fallen 2 or more times in the past year?: Yes-has poor vision and reports falls mainly when outside-needs assist of one and cane when walking outside.   Any fall with injury in the past year?: No    ADL/IADL Dependencies:   Dependent ADLs:: Bathing, Dressing, Grooming, Transfers  Dependent IADLs:: Cleaning, Cooking, Laundry, Shopping, Meal Preparation, Medication Management, Money Management, Transportation    [facility] sponsored benefits: Shared information re: Silver Sneakers/gym memberships, ASA, Calcium +D.    PCA Assessment completed at visit: Yes Annual PCA assessment indicated 12 units  per day of PCA. This is an increase from the previous assessment.     Elderly Waiver Eligibility: Yes-will continue on EW    Care Plan & Recommendations: Continue PCA, homemaking, ADC remotely.    See LTCC for detailed assessment information.    Follow-Up Plan: Member informed of future contact, plan to f/u with member with a 6 month telephone assessment.  Contact information shared with member and family, encouraged member to call with any questions or concerns at any time.    [place] care continuum providers: Please refer to Health Care Home on the Epic Problem List to view this patient's [facility] Care Plan Summary.  Chanda Pollack RN, PHN, CCM  [facility] Care Coordination  Pgkpoh-[phone]. Lmoh-[phone]

## 2021-09-22 ENCOUNTER — PATIENT OUTREACH (OUTPATIENT)
Dept: GERIATRIC MEDICINE | Facility: CLINIC | Age: 76
End: 2021-09-22

## 2021-09-22 NOTE — PROGRESS NOTES
[facility] Care Coordination Contact    MMIS completed and entered. PCA assessment completed showing member has an increase in PCA hours to 3 hrs a day (was 1.25). POC completed. Chart to CMS for processing.  Chanda Pollack RN, PHN, [facility] Care Coordination  Uadskn-[phone]. Ufgb-[phone]'

## 2021-09-24 ENCOUNTER — PATIENT OUTREACH (OUTPATIENT)
Dept: GERIATRIC MEDICINE | Facility: CLINIC | Age: 76
End: 2021-09-24

## 2021-09-24 NOTE — TELEPHONE ENCOUNTER
[facility] Care Coordination Contact    Received after visit chart from care coordinator.  Completed following tasks: Mailed copy of care plan to client, Updated services in access and Submitted referrals/auths for homemaking and ADC.  , Provider Signature - No POC Shared:  Member indicates that they do not want their POC shared with any EW providers.     and [employer]:  Faxed completed PCA assessment to PCA Agency and mailed copies to member.  Faxed MD Communication to PCP.  Emailed referral form for auth to [employer].    Member was mailed a copies of the PCA and POC signature sheets to sign and return mail with a SASE.  This assessment was completed telephonically due to Covid-19.    Chelsea Bejarano  Care Management Specialist  [facility]  [phone]

## 2021-09-24 NOTE — LETTER
September 24, 2021    Important [employer] Information    MADY NICHOLE  [address]   [place] [address]  Your Care Plan  Dear Mady,  When we spoke recently, I promised to send you a Care Plan. The plan enclosed is a summary of our discussion. It includes the steps we agreed would help you meet your health goals. In addition, I can help you with:  [id number]  This program is available to members who need a ride to medical and dental visits. To schedule a ride, call [phone] or [phone] (toll free). TTY: 711. You can call 8 a.m. to 8 p.m. Seven days a week. Access to a representative may be limited at times.    [employer]   The [employer] program empowers you to improve your health through education and exercise. To learn more, visit [employer], or call [employer] at [phone] (toll free) (TTY:711) from 7 a.m. - 7 p.m. Central Time, Monday-Friday.  Health Care Directive   This form helps you outline your health care wishes. You can request a form from me and I will answer any questions you have before you discuss it with your doctor.   Annual Physical  Take a key step on your path to good health and set up an annual physical at your clinic.  Questions?  Call me at [phone] Monday-Friday between 8am and 5pm.  TTY: 711. As we discussed, I plan to be in touch with you again in 6 months to follow up via phone.  Sincerely,    Chanda Pollack RN    E-mail: [email or web address]  Phone: [phone]      [employer]    cc: member records                                                                                             CB5 ([facility]) (5-2020)    Civil Rights Notice  Discrimination is against the law. [employer] does not discriminate on the basis of any of the following:    Race    Color    National Origin    Creed    Worship    Age    Public Assistance Status    Receipt of Health Care Services    Disability (including physical or  mental impairment)    Sex (including sex stereotypes and gender identity)    Marital Status    Political Beliefs    Medical Condition    Genetic Information    Sexual Orientation    Claims Experience    Medical History    Health Status    Auxiliary Aids and Services:  [employer] provides auxiliary aids and services, like qualified interpreters or information in accessible formats, free of charge and in a timely manner, to ensure an equal opportunity to participate in our health care programs. Contact [employer] at [employer]/contact medicaid or call [phone] (toll free); TTY:506 or at [employer]/contactmedicaid.    Language Assistance Services:  Index provides translated documents and spoken language interpreting, free of charge and in a timely manner, when language assistance services are necessary to ensure limited English speakers have meaningful access to our information and services. Contact [employer] at [phone] (toll free); TTY: 344 or [employer]/contactmedicaid.     Civil Rights Complaints  You have the right to file a discrimination complaint if you believe you were treated in a discriminatory way by [employer]. You may contact any of the following four agencies directly to file a discrimination complaint.    U.S. Department of Health and Human Services  Office for Civil Rights (OCR)  You have the right to file a complaint with the OCR, a federal agency, if you believe you have been discriminated against because of any of the following:    Race    Disability    Color    Sex    National Origin    Age    Worship (in some cases)    Contact the OCR directly to file a complaint:         Director         U.S. Department of Health and Human Services  Office for Civil Rights         [address] [place] [address]         Customer Response Center: Toll-free: [phone]          TDD: [phone]         Email: [email or web address]    [facility]  Human Rights (MDHR)  In [place], you have the right to file a complaint with the MDHR if you believe you have been discriminated against because of any of the following:      Race    Color    National Origin    Jain    Creed    Sex    Sexual Orientation    Marital Status    Public Assistance Status    Disability    Contact the MDHR directly to file a complaint:         [place] Department of Human Rights         [address] [address]         [phone] (voice)          [phone] (toll free)         710 or [phone] ([place] Relay)         [phone] (Fax)         [email or web address]. (Email)     [place] Department of Human Services (DHS)  You have the right to file a complaint with [facility] if you believe you have been discriminated against in our health care programs because of any of the following:    Race    Color    National Origin    Creed    Jain    Age    Public Assistance Status    Receipt of Health Care Services    Disability (including physical or mental impairment)    Sex (including sex stereotypes and gender identity)    Marital Status    Political Beliefs    Medical Condition    Genetic Information    Sexual Orientation    Claims Experience    Medical History    Health Status    Complaints must be in writing and filed within 180 days of the date you discovered the alleged discrimination. The complaint must contain your name and address and describe the discrimination you are complaining about. After we get your complaint, we will review it and notify you in writing about whether we have authority to investigate. If we do, we will investigate the complaint.      [facility] will notify you in writing of the investigation s outcome. You have a right to appeal the outcome if you disagree with the decision. To appeal, you must send a written request to have [facility] review the investigation outcome. Be brief and state why you disagree with the decision.  Include additional information you think is important.      If you file a complaint in this way, the people who work for the agency named in the complaint cannot retaliate against you. This means they cannot punish you in any way for filing a complaint. Filing a complaint in this way does not stop you from seeking out other legal or administration actions.     Contact DHS directly to file a discrimination complaint:        Civil Rights Coordinator        [place] Department of Human Services        Equal Opportunity and Access Division        [address] [address]        [place] [address] [phone] (voice) or use your preferred relay service     [employer] Complaint Notice   You have the right to file a complaint with [employer] if you believe you have been discriminated against because of any of the following:       Medical condition    Health status    Receipt of health care services    Claims experience    Medical history    Genetic information    Disability (including mental or physical impairment)    Marital status    Age    Sex (including sex stereotypes and gender identity)    Sexual orientation    National origin    Race    Color    Yazidi    Creed    Public assistance status    Political beliefs    You can file a complaint and ask for help in filing a complaint in person or by mail, phone, fax, or email at:     [employer] Civil Rights Coordinator  [facility] [employer] [facility] [address] [place] [address] [phone] (voice and fax) or TTV:278  Email: [email or web address]    American Indians can begin or continue to use [place] and  Health Services (I[facility]) clinics. We will not require prior approval or impose any conditions for you to get services at these clinics. For elders age 65 years and older this includes Elderly Waiver (EW) services accessed through the [employer]. If a doctor or other provider in a [facility] or [facility] clinic refers you to a provider in our network, we  will not require you to see your primary care provider prior to the referral.

## 2021-09-28 DIAGNOSIS — I10 ESSENTIAL HYPERTENSION: ICD-10-CM

## 2021-09-30 RX ORDER — LOSARTAN POTASSIUM 100 MG/1
TABLET ORAL
Qty: 90 TABLET | Refills: 1 | Status: SHIPPED | OUTPATIENT
Start: 2021-09-30 | End: 2022-03-14

## 2021-09-30 NOTE — TELEPHONE ENCOUNTER
Prescription approved per [facility] Refill Protocol.    Nyla KAYE RN  EP Triage

## 2021-10-02 ENCOUNTER — HEALTH MAINTENANCE LETTER (OUTPATIENT)
Age: 76
End: 2021-10-02

## 2021-10-11 ASSESSMENT — ENCOUNTER SYMPTOMS
EYE PAIN: 0
NAUSEA: 0
MYALGIAS: 0
CONSTIPATION: 0
FREQUENCY: 0
SHORTNESS OF BREATH: 0
JOINT SWELLING: 0
HEADACHES: 0
HEARTBURN: 0
DIARRHEA: 0
PARESTHESIAS: 0
NERVOUS/ANXIOUS: 0
DIZZINESS: 0
FEVER: 0
PALPITATIONS: 0
HEMATOCHEZIA: 0
HEMATURIA: 0
ABDOMINAL PAIN: 0
SORE THROAT: 0
DYSURIA: 0
ARTHRALGIAS: 1
WEAKNESS: 0
CHILLS: 0
COUGH: 0

## 2021-10-11 ASSESSMENT — ACTIVITIES OF DAILY LIVING (ADL)
CURRENT_FUNCTION: LAUNDRY REQUIRES ASSISTANCE
CURRENT_FUNCTION: HOUSEWORK REQUIRES ASSISTANCE
CURRENT_FUNCTION: TELEPHONE REQUIRES ASSISTANCE
CURRENT_FUNCTION: TRANSPORTATION REQUIRES ASSISTANCE
CURRENT_FUNCTION: SHOPPING REQUIRES ASSISTANCE

## 2021-10-12 ENCOUNTER — OFFICE VISIT (OUTPATIENT)
Dept: FAMILY MEDICINE | Facility: CLINIC | Age: 76
End: 2021-10-12
Payer: COMMERCIAL

## 2021-10-12 VITALS
WEIGHT: 168 LBS | SYSTOLIC BLOOD PRESSURE: 132 MMHG | HEIGHT: 67 IN | DIASTOLIC BLOOD PRESSURE: 72 MMHG | RESPIRATION RATE: 12 BRPM | OXYGEN SATURATION: 95 % | TEMPERATURE: 96.3 F | BODY MASS INDEX: 26.37 KG/M2 | HEART RATE: 69 BPM

## 2021-10-12 DIAGNOSIS — L30.8 OTHER ECZEMA: ICD-10-CM

## 2021-10-12 DIAGNOSIS — Z12.5 SCREENING FOR PROSTATE CANCER: ICD-10-CM

## 2021-10-12 DIAGNOSIS — Z23 HIGH PRIORITY FOR 2019-NCOV VACCINE: ICD-10-CM

## 2021-10-12 DIAGNOSIS — Z00.00 HEALTH MAINTENANCE EXAMINATION: Primary | ICD-10-CM

## 2021-10-12 DIAGNOSIS — E78.2 MIXED HYPERLIPIDEMIA: ICD-10-CM

## 2021-10-12 LAB — HGB BLD-MCNC: 17.3 G/DL (ref 13.3–17.7)

## 2021-10-12 PROCEDURE — 99397 PER PM REEVAL EST PAT 65+ YR: CPT | Mod: 25 | Performed by: FAMILY MEDICINE

## 2021-10-12 PROCEDURE — G0103 PSA SCREENING: HCPCS | Performed by: FAMILY MEDICINE

## 2021-10-12 PROCEDURE — 0004A COVID-19,PF,PFIZER (12+ YRS): CPT | Performed by: FAMILY MEDICINE

## 2021-10-12 PROCEDURE — 91300 COVID-19,PF,PFIZER (12+ YRS): CPT | Performed by: FAMILY MEDICINE

## 2021-10-12 PROCEDURE — 36415 COLL VENOUS BLD VENIPUNCTURE: CPT | Performed by: FAMILY MEDICINE

## 2021-10-12 PROCEDURE — 85018 HEMOGLOBIN: CPT | Performed by: FAMILY MEDICINE

## 2021-10-12 PROCEDURE — 80048 BASIC METABOLIC PNL TOTAL CA: CPT | Performed by: FAMILY MEDICINE

## 2021-10-12 PROCEDURE — 80061 LIPID PANEL: CPT | Performed by: FAMILY MEDICINE

## 2021-10-12 PROCEDURE — 92551 PURE TONE HEARING TEST AIR: CPT | Performed by: FAMILY MEDICINE

## 2021-10-12 PROCEDURE — 84460 ALANINE AMINO (ALT) (SGPT): CPT | Performed by: FAMILY MEDICINE

## 2021-10-12 PROCEDURE — 99213 OFFICE O/P EST LOW 20 MIN: CPT | Mod: 25 | Performed by: FAMILY MEDICINE

## 2021-10-12 RX ORDER — TRIAMCINOLONE ACETONIDE 1 MG/G
CREAM TOPICAL 2 TIMES DAILY
Qty: 45 G | Refills: 1 | Status: SHIPPED | OUTPATIENT
Start: 2021-10-12 | End: 2022-09-26

## 2021-10-12 ASSESSMENT — ENCOUNTER SYMPTOMS
ARTHRALGIAS: 1
DIARRHEA: 0
EYE PAIN: 0
CONSTIPATION: 0
SORE THROAT: 0
HEARTBURN: 0
HEMATOCHEZIA: 0
WEAKNESS: 0
PARESTHESIAS: 0
FEVER: 0
MYALGIAS: 0
CHILLS: 0
HEADACHES: 0
SHORTNESS OF BREATH: 0
JOINT SWELLING: 0
NAUSEA: 0
FREQUENCY: 0
COUGH: 0
HEMATURIA: 0
DYSURIA: 0
NERVOUS/ANXIOUS: 0
DIZZINESS: 0
ABDOMINAL PAIN: 0
PALPITATIONS: 0

## 2021-10-12 ASSESSMENT — MIFFLIN-ST. JEOR: SCORE: 1438.28

## 2021-10-12 ASSESSMENT — ACTIVITIES OF DAILY LIVING (ADL)
CURRENT_FUNCTION: LAUNDRY REQUIRES ASSISTANCE
CURRENT_FUNCTION: TELEPHONE REQUIRES ASSISTANCE
CURRENT_FUNCTION: HOUSEWORK REQUIRES ASSISTANCE
CURRENT_FUNCTION: SHOPPING REQUIRES ASSISTANCE
CURRENT_FUNCTION: TRANSPORTATION REQUIRES ASSISTANCE

## 2021-10-12 ASSESSMENT — PAIN SCALES - GENERAL: PAINLEVEL: NO PAIN (0)

## 2021-10-12 NOTE — PROGRESS NOTES
"SUBJECTIVE:   Roselia العراقي is a 76 year old male who presents for Preventive Visit.      Patient has been advised of split billing requirements and indicates understanding: Yes   Are you in the first 12 months of your Medicare coverage?  No    Healthy Habits:     In general, how would you rate your overall health?  Good    Frequency of exercise:  4-5 days/week    Duration of exercise:  30-45 minutes    Do you usually eat at least 4 servings of fruit and vegetables a day, include whole grains    & fiber and avoid regularly eating high fat or \"junk\" foods?  Yes    Taking medications regularly:  Yes    Medication side effects:  Other    Ability to successfully perform activities of daily living:  Telephone requires assistance, transportation requires assistance, shopping requires assistance, housework requires assistance and laundry requires assistance    Home Safety:  No safety concerns identified    Hearing Impairment:  Feel that people are mumbling or not speaking clearly    In the past 6 months, have you been bothered by leaking of urine?  No    In general, how would you rate your overall mental or emotional health?  Good      PHQ-2 Total Score: 1    Additional concerns today:  No    Do you feel safe in your environment? Yes    Have you ever done Advance Care Planning? (For example, a Health Directive, POLST, or a discussion with a medical provider or your loved ones about your wishes): No, advance care planning information given to patient to review.  Patient declined advance care planning discussion at this time.      Right Ear:      1000 Hz RESPONSE- on Level: 40 db (Conditioning sound)   1000 Hz: RESPONSE- on Level:   20 db    2000 Hz: RESPONSE- on Level:   20 db    4000 Hz: RESPONSE- on Level:   20 db     Left Ear:      4000 Hz: RESPONSE- on Level:   20 db    2000 Hz: RESPONSE- on Level:   20 db    1000 Hz: RESPONSE- on Level:   20 db     500 Hz: RESPONSE- on Level: 25 db    Right Ear:    500 Hz: RESPONSE- on " Level: 25 db    Hearing Acuity: Pass    Hearing Assessment: normal   Fall risk       Cognitive Screening   1) Repeat 3 items (Leader, Season, Table)    2) Clock draw: NORMAL  3) 3 item recall: Recalls 2 objects   Results: NORMAL clock, 1-2 items recalled: COGNITIVE IMPAIRMENT LESS LIKELY    Mini-CogTM Copyright S Dk. Licensed by the author for use in [facility]; reprinted with permission ([email or web address]). All rights reserved.      Do you have sleep apnea, excessive snoring or daytime drowsiness?: no    Reviewed and updated as needed this visit by clinical staff                 Reviewed and updated as needed this visit by Provider                Social History     Tobacco Use     Smoking status: Former Smoker     Packs/day: 0.30     Years: 20.00     Pack years: 6.00     Types: Cigarettes     Smokeless tobacco: Never Used   Substance Use Topics     Alcohol use: No     Alcohol/week: 0.0 standard drinks     If you drink alcohol do you typically have >3 drinks per day or >7 drinks per week? No    Alcohol Use 10/11/2021   Prescreen: >3 drinks/day or >7 drinks/week? No   Prescreen: >3 drinks/day or >7 drinks/week? -       Current providers sharing in care for this patient include:   Patient Care Team:  David Turcios MD as PCP - General (Family Practice)  David Turcios MD as Assigned PCP  Chanda Pollack RN as Lead Care Coordinator (Primary Care - CC)    The following health maintenance items are reviewed in Epic and correct as of today:  Health Maintenance Due   Topic Date Due     INFLUENZA VACCINE (1) 09/01/2021     MEDICARE ANNUAL WELLNESS VISIT  10/01/2021     BP Readings from Last 3 Encounters:   10/12/21 132/72   07/21/21 132/76   05/27/21 130/70    Wt Readings from Last 3 Encounters:   10/12/21 76.2 kg (168 lb)   07/21/21 78 kg (172 lb)   05/27/21 77.6 kg (171 lb)                  Patient Active Problem List   Diagnosis     Smoking     Dyspepsia     Varicose veins     Hyperlipidemia LDL goal <130     H.  pylori infection     Vertigo, benign positional     Essential hypertension     Hyperlipidemia with target LDL less than 130     Advanced directives, counseling/discussion     Left knee pain     Benign neoplasm of colon     No past surgical history on file.    Social History     Tobacco Use     Smoking status: Former Smoker     Packs/day: 0.30     Years: 20.00     Pack years: 6.00     Types: Cigarettes     Smokeless tobacco: Never Used   Substance Use Topics     Alcohol use: No     Alcohol/week: 0.0 standard drinks     Family History   Problem Relation Age of Onset     Hypertension Father      Cerebrovascular Disease Father      Hypertension Brother          Current Outpatient Medications   Medication Sig Dispense Refill     calcium carbonate 500 MG tablet Take 1 tablet by mouth daily Client taking it twice a day 100 tablet 3     chlorthalidone (HYGROTON) 25 MG tablet TAKE 1 TABLET BY MOUTH DAILY 90 tablet 2     clotrimazole-betamethasone (LOTRISONE) 1-0.05 % external lotion Apply topically 2 times daily       fish oil-omega-3 fatty acids 1000 MG capsule Take 2 capsules by mouth daily. 180 capsule 12     losartan (COZAAR) 100 MG tablet TAKE 1 TABLET BY MOUTH EVERY DAY 90 tablet 1     Multiple Vitamins-Minerals (ONE-A-DAY MENS 50+ ADVANTAGE PO)        Multiple Vitamins-Minerals (PRESERVISION AREDS 2) CAPS        simvastatin (ZOCOR) 20 MG tablet TAKE 1 TABLET BY MOUTH AT BEDTIME 90 tablet 1     triamcinolone (KENALOG) 0.1 % external cream Apply topically 2 times daily 45 g 1     UNABLE TO FIND MEDICATION NAME: PreserVision AREDS 2 formula soft gel. 1 soft gel twice daily       aspirin (ASA) 81 MG chewable tablet Take 1 tablet (81 mg) by mouth daily (Patient not taking: Reported on 10/12/2021)       naproxen (NAPROSYN) 500 MG tablet Take 1 tablet (500 mg) by mouth 2 times daily (with meals) (Patient not taking: Reported on 10/12/2021)       sennosides (SENOKOT) 8.6 MG tablet Take 1 tablet by mouth daily as needed for  constipation (Patient not taking: Reported on 7/21/2021) 30 tablet 3     sertraline (ZOLOFT) 25 MG tablet TAKE 1 TABLET BY MOUTH EVERY DAY (Patient not taking: Reported on 10/12/2021) 90 tablet 1     terbinafine (LAMISIL) 250 MG tablet Take 1 tablet (250 mg) by mouth 2 times daily (Patient not taking: Reported on 10/12/2021) 180 tablet 0     UNABLE TO FIND MEDICATION NAME: Tumeric 1000 mg OTC (Patient not taking: Reported on 10/12/2021)       UNABLE TO FIND MEDICATION NAME: OTC -Red Yeast Rice 1200 mg, 1 tablet twice daily (Patient not taking: Reported on 10/12/2021)       Allergies   Allergen Reactions     Penicillins      Sulfa Drugs Hives     Sulphasomidine      Tetracycline      Recent Labs   Lab Test 04/09/21  1058 10/01/20  1153 02/25/20  1002 02/25/20  1002 09/24/19  1036 03/21/19  1051 03/21/19  1051 06/25/15  1226 12/02/13  1149 10/03/13  1110   * 57  --  77 58   < > 98   < >   < >  --    HDL  --  41  --   --  37*  --  28*  --    < >  --    TRIG  --  205*  --   --  223*  --  381*  --    < >  --    ALT 59 41  --  46 32   < > 37   < >   < > 74*   CR 0.88 0.97   < > 0.86 0.86   < > 0.89   < >   < > 1.05   GFRESTIMATED 83 75   < > 84 85   < > 84   < >   < > 70   GFRESTBLACK >90 87   < > >90 >90   < > >90   < >   < > 85   POTASSIUM 4.0 4.4   < > 3.9 4.3   < > 4.2   < >   < > 3.6   TSH  --   --   --   --   --   --   --   --   --  2.04    < > = values in this interval not displayed.      Pneumonia Vaccine:Adults age 65+ who received Pneumovax (PPSV23) at 65 years or older: Should be given PCV13 > 1 year after their most recent PPSV23        Review of Systems   Constitutional: Negative for chills and fever.   HENT: Negative for congestion, ear pain, hearing loss and sore throat.    Eyes: Negative for pain and visual disturbance.   Respiratory: Negative for cough and shortness of breath.    Cardiovascular: Negative for chest pain, palpitations and peripheral edema.   Gastrointestinal: Negative for abdominal  "pain, constipation, diarrhea, heartburn, hematochezia and nausea.   Genitourinary: Negative for discharge, dysuria, frequency, genital sores, hematuria, impotence and urgency.   Musculoskeletal: Positive for arthralgias. Negative for joint swelling and myalgias.   Skin: Negative for rash.   Neurological: Negative for dizziness, weakness, headaches and paresthesias.   Psychiatric/Behavioral: Negative for mood changes. The patient is not nervous/anxious.          OBJECTIVE:   /72   Pulse 69   Temp (!) 96.3  F (35.7  C) (Tympanic)   Resp 12   Ht 1.69 m (5' 6.54\")   Wt 76.2 kg (168 lb)   SpO2 95%   BMI 26.68 kg/m   Estimated body mass index is 26.68 kg/m  as calculated from the following:    Height as of this encounter: 1.69 m (5' 6.54\").    Weight as of this encounter: 76.2 kg (168 lb).  Physical Exam  GENERAL: healthy, alert and no distress  EYES: Eyes grossly normal to inspection, PERRL and conjunctivae and sclerae normal  HENT: ear canals and TM's normal, nose and mouth without ulcers or lesions  NECK: no adenopathy, no asymmetry, masses, or scars and thyroid normal to palpation  RESP: lungs clear to auscultation - no rales, rhonchi or wheezes  CV: regular rate and rhythm, normal S1 S2, no S3 or S4, no murmur, click or rub, no peripheral edema and peripheral pulses strong  ABDOMEN: soft, nontender, no hepatosplenomegaly, no masses and bowel sounds normal  MS: no gross musculoskeletal defects noted, no edema  SKIN: no suspicious lesions or rashes  NEURO: Normal strength and tone, mentation intact and speech normal        ASSESSMENT / PLAN:   (Z00.00) Health maintenance examination  (primary encounter diagnosis)  Comment:   Plan: Hemoglobin, Basic metabolic panel  (Ca, Cl,         CO2, Creat, Gluc, K, Na, BUN), ALT, Lipid panel        reflex to direct LDL Fasting, PSA, screen            (Z12.5) Screening for prostate cancer  Comment:   Plan: PSA, screen            (L30.8) Other eczema  Comment: worsening " "leg eczema with seasonal change and dry air for last 1 month, will have him to try triamcinolone topical cream with current dose of cetrizine  Plan: triamcinolone (KENALOG) 0.1 % external cream        Will recheck in 1-2 months      Patient has been advised of split billing requirements and indicates understanding: Yes  COUNSELING:  Reviewed preventive health counseling, as reflected in patient instructions    Estimated body mass index is 27.32 kg/m  as calculated from the following:    Height as of 7/21/21: 1.69 m (5' 6.54\").    Weight as of 7/21/21: 78 kg (172 lb).        He reports that he has quit smoking. His smoking use included cigarettes. He has a 6.00 pack-year smoking history. He has never used smokeless tobacco.      Appropriate preventive services were discussed with this patient, including applicable screening as appropriate for cardiovascular disease, diabetes, osteopenia/osteoporosis, and glaucoma.  As appropriate for age/gender, discussed screening for colorectal cancer, prostate cancer, breast cancer, and cervical cancer. Checklist reviewing preventive services available has been given to the patient.    Reviewed patients plan of care and provided an AVS. The Basic Care Plan (routine screening as documented in Health Maintenance) for Lim[place] meets the Care Plan requirement. This Care Plan has been established and reviewed with the Patient.    Counseling Resources:  ATP IV Guidelines  Pooled Cohorts Equation Calculator  Breast Cancer Risk Calculator  Breast Cancer: Medication to Reduce Risk  FRAX Risk Assessment  ICSI Preventive Guidelines  Dietary Guidelines for Americans, 2010  [employer] MyPlate  ASA Prophylaxis  Lung CA Screening    David Turcios MD  [facility]    Identified Health Risks:  "

## 2021-10-13 LAB
ALT SERPL W P-5'-P-CCNC: 75 U/L (ref 0–70)
ANION GAP SERPL CALCULATED.3IONS-SCNC: 3 MMOL/L (ref 3–14)
BUN SERPL-MCNC: 20 MG/DL (ref 7–30)
CALCIUM SERPL-MCNC: 8.9 MG/DL (ref 8.5–10.1)
CHLORIDE BLD-SCNC: 98 MMOL/L (ref 94–109)
CHOLEST SERPL-MCNC: 185 MG/DL
CO2 SERPL-SCNC: 32 MMOL/L (ref 20–32)
CREAT SERPL-MCNC: 0.85 MG/DL (ref 0.66–1.25)
FASTING STATUS PATIENT QL REPORTED: YES
GFR SERPL CREATININE-BSD FRML MDRD: 84 ML/MIN/1.73M2
GLUCOSE BLD-MCNC: 97 MG/DL (ref 70–99)
HDLC SERPL-MCNC: 37 MG/DL
LDLC SERPL CALC-MCNC: 104 MG/DL
NONHDLC SERPL-MCNC: 148 MG/DL
POTASSIUM BLD-SCNC: 3.6 MMOL/L (ref 3.4–5.3)
PSA SERPL-MCNC: 3.41 UG/L (ref 0–4)
SODIUM SERPL-SCNC: 133 MMOL/L (ref 133–144)
TRIGL SERPL-MCNC: 219 MG/DL

## 2021-10-13 RX ORDER — SIMVASTATIN 40 MG
40 TABLET ORAL AT BEDTIME
Qty: 90 TABLET | Refills: 1 | Status: SHIPPED | OUTPATIENT
Start: 2021-10-13 | End: 2022-04-05

## 2021-10-14 DIAGNOSIS — L23.9 DERMAL HYPERSENSITIVITY REACTION: ICD-10-CM

## 2021-10-15 RX ORDER — CETIRIZINE HYDROCHLORIDE 10 MG/1
TABLET ORAL
Qty: 90 TABLET | Refills: 0 | Status: SHIPPED | OUTPATIENT
Start: 2021-10-15 | End: 2022-01-10

## 2021-10-15 NOTE — TELEPHONE ENCOUNTER
Routing refill request to provider for review/approval because:  Drug not active on patient's medication list  Failed protocol    Nyla KAYE RN  EP Triage

## 2022-01-08 DIAGNOSIS — L23.9 DERMAL HYPERSENSITIVITY REACTION: ICD-10-CM

## 2022-01-10 RX ORDER — CETIRIZINE HYDROCHLORIDE 10 MG/1
TABLET ORAL
Qty: 90 TABLET | Refills: 0 | Status: SHIPPED | OUTPATIENT
Start: 2022-01-10 | End: 2022-09-07

## 2022-01-10 NOTE — TELEPHONE ENCOUNTER
Failed Age protocol.  please route to  team if patient needs an appointment   Brianna ANTONRN BSN  [facility]- [facility]  [phone]

## 2022-01-12 ENCOUNTER — PATIENT OUTREACH (OUTPATIENT)
Dept: GERIATRIC MEDICINE | Facility: CLINIC | Age: 77
End: 2022-01-12
Payer: COMMERCIAL

## 2022-01-12 NOTE — PROGRESS NOTES
[facility] Care Coordination Contact    Both member and wife attend [facility] virtually. Since member and wife not using the funds for transportation to [facility], plan is to have member and wife attend [facility] 5 days a week virtually. If member goes back to [facility] in person, they only want to go 4 days a week. Request sent to CMS to please put a new auth in.  Chanda Pollack RN, PHN, [facility] Care Coordination  Yolqmz-[phone]. Gajz-[phone]

## 2022-02-22 ENCOUNTER — PATIENT OUTREACH (OUTPATIENT)
Dept: GERIATRIC MEDICINE | Facility: CLINIC | Age: 77
End: 2022-02-22
Payer: COMMERCIAL

## 2022-02-22 NOTE — PROGRESS NOTES
[facility] Care Coordination Contact      [facility] Six-Month Telephone Assessment    6 month telephone assessment completed on 2/22/22.    ER visits: No  Hospitalizations: No  TCU stays: No  Significant health status changes: none  Falls/Injuries: Yes: Fell once on the black ice-no injuries  ADL/IADL changes: No  Changes in services: No    Caregiver Assessment follow up:  na    Goals: See POC in chart for goal progress documentation.     Will see member in 6 months for an annual health risk assessment.   Encouraged member to call CC with any questions or concerns in the meantime.   Chanda Pollack RN, PHN, CCM  [facility] Care Coordination  Svcpwr-[phone]. Sajd-[phone]

## 2022-03-02 ENCOUNTER — TELEPHONE (OUTPATIENT)
Dept: FAMILY MEDICINE | Facility: CLINIC | Age: 77
End: 2022-03-02
Payer: COMMERCIAL

## 2022-03-02 DIAGNOSIS — Z53.9 DIAGNOSIS NOT YET DEFINED: Primary | ICD-10-CM

## 2022-03-02 PROCEDURE — G0179 MD RECERTIFICATION HHA PT: HCPCS | Performed by: FAMILY MEDICINE

## 2022-03-02 NOTE — TELEPHONE ENCOUNTER
Reason for Call:  Form, our goal is to have forms completed with 72 hours, however, some forms may require a visit or additional information.    Type of letter, form or note:  Home Health Certification    Who is the form from?: Home care    Where did the form come from: form was faxed in    What clinic location was the form placed at?: [facility] [place]    Where the form was placed: Form given to Dr Turcios    What number is listed as a contact on the form?: NA       Additional comments: NA    Call taken on 3/2/2022 at 11:15 AM by Lissy Panchal

## 2022-03-04 NOTE — TELEPHONE ENCOUNTER
Home Health Certification completed and  faxed back to [employer] [facility] and sent to abstraction.  Lissy Panchal,

## 2022-03-14 DIAGNOSIS — I10 ESSENTIAL HYPERTENSION: ICD-10-CM

## 2022-03-14 RX ORDER — LOSARTAN POTASSIUM 100 MG/1
TABLET ORAL
Qty: 90 TABLET | Refills: 1 | Status: SHIPPED | OUTPATIENT
Start: 2022-03-14 | End: 2022-09-07

## 2022-03-14 NOTE — TELEPHONE ENCOUNTER
Prescription approved per [facility] Refill Protocol.    Nyla KAYE RN  EP Triage

## 2022-03-21 ENCOUNTER — PATIENT OUTREACH (OUTPATIENT)
Dept: GERIATRIC MEDICINE | Facility: CLINIC | Age: 77
End: 2022-03-21
Payer: COMMERCIAL

## 2022-03-21 NOTE — TELEPHONE ENCOUNTER
[facility] Care Coordination Contact    Member Signature - POC Change:  Per CC, member has made a change to their POC.  Care Plan Change Letter mailed to member for signature with a self-addressed return envelope.     Chelsea Bejarano  Care Management Specialist  [facility]  [phone]

## 2022-03-29 DIAGNOSIS — E78.5 HYPERLIPIDEMIA WITH TARGET LDL LESS THAN 130: ICD-10-CM

## 2022-03-29 DIAGNOSIS — I10 ESSENTIAL HYPERTENSION: ICD-10-CM

## 2022-03-30 RX ORDER — CHLORTHALIDONE 25 MG/1
TABLET ORAL
Qty: 90 TABLET | Refills: 1 | Status: SHIPPED | OUTPATIENT
Start: 2022-03-30 | End: 2022-09-22

## 2022-03-30 NOTE — TELEPHONE ENCOUNTER
Prescription approved per [facility] Refill Protocol.    Nyla KAYE RN  EP Triage

## 2022-04-05 DIAGNOSIS — E78.2 MIXED HYPERLIPIDEMIA: ICD-10-CM

## 2022-04-05 RX ORDER — SIMVASTATIN 40 MG
TABLET ORAL
Qty: 90 TABLET | Refills: 1 | Status: SHIPPED | OUTPATIENT
Start: 2022-04-05 | End: 2022-10-04

## 2022-04-05 NOTE — TELEPHONE ENCOUNTER
Prescription approved per [facility] Refill Protocol.    Nyla KAYE RN  EP Triage

## 2022-04-13 ENCOUNTER — OFFICE VISIT (OUTPATIENT)
Dept: FAMILY MEDICINE | Facility: CLINIC | Age: 77
End: 2022-04-13
Payer: COMMERCIAL

## 2022-04-13 VITALS
HEIGHT: 66 IN | WEIGHT: 172 LBS | RESPIRATION RATE: 16 BRPM | SYSTOLIC BLOOD PRESSURE: 128 MMHG | HEART RATE: 86 BPM | OXYGEN SATURATION: 96 % | BODY MASS INDEX: 27.64 KG/M2 | DIASTOLIC BLOOD PRESSURE: 70 MMHG | TEMPERATURE: 97.5 F

## 2022-04-13 DIAGNOSIS — E78.2 MIXED HYPERLIPIDEMIA: Primary | ICD-10-CM

## 2022-04-13 DIAGNOSIS — I10 ESSENTIAL HYPERTENSION: ICD-10-CM

## 2022-04-13 PROCEDURE — 83721 ASSAY OF BLOOD LIPOPROTEIN: CPT | Performed by: FAMILY MEDICINE

## 2022-04-13 PROCEDURE — 80048 BASIC METABOLIC PNL TOTAL CA: CPT | Performed by: FAMILY MEDICINE

## 2022-04-13 PROCEDURE — 36415 COLL VENOUS BLD VENIPUNCTURE: CPT | Performed by: FAMILY MEDICINE

## 2022-04-13 PROCEDURE — 99214 OFFICE O/P EST MOD 30 MIN: CPT | Performed by: FAMILY MEDICINE

## 2022-04-13 NOTE — PROGRESS NOTES
"  Assessment & Plan     Mixed hyperlipidemia  Has been slightly elevated, will have him to recheck lab and continue working on life style modification including low fat/carb diet with regular exercise.  - LDL cholesterol direct; Future  - Basic metabolic panel  (Ca, Cl, CO2, Creat, Gluc, K, Na, BUN); Future    Essential hypertension  Stable, continue monitoring   - LDL cholesterol direct; Future  - Basic metabolic panel  (Ca, Cl, CO2, Creat, Gluc, K, Na, BUN); Future           BMI:   Estimated body mass index is 27.76 kg/m  as calculated from the following:    Height as of this encounter: 1.676 m (5' 6\").    Weight as of this encounter: 78 kg (172 lb).   Weight management plan: Discussed healthy diet and exercise guidelines    FUTURE APPOINTMENTS:       - Follow-up visit in 6 months for CPE    No follow-ups on file.    David Turcios MD  [facility] SUNI Veloz is a 77 year old who presents for the following health issues     History of Present Illness       Hyperlipidemia:  He presents for follow up of hyperlipidemia.  He is taking medication to lower cholesterol. He is not having myalgia or other side effects to statin medications.    Hypertension: He presents for follow up of hypertension.  He does not check blood pressure  regularly outside of the clinic. Outside blood pressures have been over 140/90. He follows a low salt diet.     He eats 2-3 servings of fruits and vegetables daily.He consumes 0 sweetened beverage(s) daily.He exercises with enough effort to increase his heart rate 30 to 60 minutes per day.  He exercises with enough effort to increase his heart rate 6 days per week.   He is taking medications regularly.         Review of Systems   Constitutional, HEENT, cardiovascular, pulmonary, gi and gu systems are negative, except as otherwise noted.      Objective    /70   Pulse 86   Temp 97.5  F (36.4  C) (Tympanic)   Resp 16   Ht 1.676 m (5' 6\")   Wt 78 kg " (172 lb)   SpO2 96%   BMI 27.76 kg/m    Body mass index is 27.76 kg/m .  Physical Exam   GENERAL: healthy, alert and no distress  NECK: no adenopathy, no asymmetry, masses, or scars and thyroid normal to palpation  RESP: lungs clear to auscultation - no rales, rhonchi or wheezes  CV: regular rate and rhythm, normal S1 S2, no S3 or S4, no murmur, click or rub, no peripheral edema and peripheral pulses strong  ABDOMEN: soft, nontender, no hepatosplenomegaly, no masses and bowel sounds normal  MS: no gross musculoskeletal defects noted, no edema

## 2022-04-14 LAB
ANION GAP SERPL CALCULATED.3IONS-SCNC: 7 MMOL/L (ref 3–14)
BUN SERPL-MCNC: 16 MG/DL (ref 7–30)
CALCIUM SERPL-MCNC: 9.2 MG/DL (ref 8.5–10.1)
CHLORIDE BLD-SCNC: 95 MMOL/L (ref 94–109)
CO2 SERPL-SCNC: 30 MMOL/L (ref 20–32)
CREAT SERPL-MCNC: 0.84 MG/DL (ref 0.66–1.25)
GFR SERPL CREATININE-BSD FRML MDRD: 89 ML/MIN/1.73M2
GLUCOSE BLD-MCNC: 98 MG/DL (ref 70–99)
LDLC SERPL CALC-MCNC: 56 MG/DL
POTASSIUM BLD-SCNC: 3.4 MMOL/L (ref 3.4–5.3)
SODIUM SERPL-SCNC: 132 MMOL/L (ref 133–144)

## 2022-05-16 NOTE — TELEPHONE ENCOUNTER
Reason for Call:  Form, our goal is to have forms completed with 72 hours, however, some forms may require a visit or additional information.    Type of letter, form or note:  Home Health Certification    Who is the form from?: Home care    Where did the form come from: form was faxed in    What clinic location was the form placed at?: [facility]    Where the form was placed: Form given to Berenice Shah RN    What number is listed as a contact on the form?: NA       Additional comments: NA    Call taken on 9/2/2021 at 1:58 PM by Lissy Panchal         TRANSFER

## 2022-06-13 ENCOUNTER — MEDICAL CORRESPONDENCE (OUTPATIENT)
Dept: HEALTH INFORMATION MANAGEMENT | Facility: CLINIC | Age: 77
End: 2022-06-13
Payer: COMMERCIAL

## 2022-06-13 ENCOUNTER — TELEPHONE (OUTPATIENT)
Dept: FAMILY MEDICINE | Facility: CLINIC | Age: 77
End: 2022-06-13
Payer: COMMERCIAL

## 2022-06-13 NOTE — TELEPHONE ENCOUNTER
Reason for Call:  Form, our goal is to have forms completed with 72 hours, however, some forms may require a visit or additional information.    Type of letter, form or note:  Home Health Certification    Who is the form from?: Home care    Where did the form come from: form was faxed in    What clinic location was the form placed at?: [facility] [place] [facility]    Where the form was placed: Given to physician    What number is listed as a contact on the form?:   [facility]  Fax: [phone]       Additional comments:   Placed in red folder on Dr. Turcios's desk    Call taken on 6/13/2022 at 1:39 PM by Milka Fuller

## 2022-06-14 NOTE — TELEPHONE ENCOUNTER
Home health cert complete and signed by Dr. Turcios. Faxed to [employer] [facility] Fax: [phone] and sent to abstraction.    Milka Fuller,  [facility]

## 2022-08-15 ENCOUNTER — PATIENT OUTREACH (OUTPATIENT)
Dept: GERIATRIC MEDICINE | Facility: CLINIC | Age: 77
End: 2022-08-15

## 2022-08-15 NOTE — PROGRESS NOTES
[facility] Care Coordination Contact    [facility] Home Visit Assessment     Home visit for Health Risk Assessment with Roselia العراقي completed on August 15, 2022. Visit was done via telephone due to COVID 19 and restrictions on home visits.    Type of residence:: Apartment - handicap accessible  Current living arrangement:: I live in a private home with spouse (Has own apt with wife, but during COVID, has been spending most of week at daughter's home and weekends at their apt.)     Assessment completed with:: Patient, Children-daughter Emilee, Other ([facility]  ID [id number])    Current Care Plan  Member currently receiving the following home care services:     Member currently receiving the following community resources: Day Care, Transportation Services, Housekeeping/Chore Agency, PCA    Medication Review  Medication reconciliation completed in Epic: no-visit was done via telephone and not able to see med bottles. Member did not know name of meds but what they were for.  Medication set-up & administration: Family/informal caregiver sets up weekly.  Self-administers medications.  MTM:declined    Mental/Behavioral Health   Depression Screening:   PHQ-2 Total Score (Adult) - Positive if 3 or more points; Administer PHQ-9 if positive: 0  Mental health DX:: Yes   Mental health DX how managed:: None    Falls Assessment:   Fallen 2 or more times in the past year?: No   Any fall with injury in the past year?: No    ADL/IADL Dependencies:   Dependent ADLs:: Bathing, Dressing, Grooming, Transfers  Dependent IADLs:: Cleaning, Cooking, Laundry, Shopping, Meal Preparation, Medication Management, Money Management, Transportation    [facility] sponsored benefits: Shared information re: Silver Sneakers/gym memberships, ASA, Calcium +D.    PCA Assessment completed at visit: Yes Annual PCA assessment indicated 12 units per day of PCA. This is the same as the previous assessment.     Elderly Waiver  Eligibility: Yes-will continue on EW    Care Plan & Recommendations: Continue PCA, ADC virtually or in person, and homemaking    See LTCC for detailed assessment information.    Follow-Up Plan: Member informed of future contact, plan to f/u with member with a 6 month telephone assessment.  Contact information shared with member and family, encouraged member to call with any questions or concerns at any time.    [place] care continuum providers: Please see Snapshot and Care Management Flowsheets for Specific details of care plan.    This CC note routed to PCP.  Chanda Pollack RN, PHN, CCM  [facility] Care Coordination  Didbji-[phone]. [phone]

## 2022-08-17 ENCOUNTER — PATIENT OUTREACH (OUTPATIENT)
Dept: GERIATRIC MEDICINE | Facility: CLINIC | Age: 77
End: 2022-08-17

## 2022-08-17 NOTE — PROGRESS NOTES
No member contact. CC updated tasks and targets for [employer] [employer].  Chanda Pollack RN, PHN, CCM  [facility] Care Coordination  Ilkfsv-[phone]. Qrst-[phone]

## 2022-08-18 ENCOUNTER — PATIENT OUTREACH (OUTPATIENT)
Dept: GERIATRIC MEDICINE | Facility: CLINIC | Age: 77
End: 2022-08-18

## 2022-08-18 NOTE — LETTER
August 18, 2022    Important [employer] Information    MADY NICHOLE  [address]   [place] [address]  Your Care Plan  Dear Mady,  When we spoke recently, I promised to send you a Care Plan. The plan enclosed is a summary of our discussion. It includes the steps we agreed would help you meet your health goals. In addition, I can help you with:  Unqtcsr-I-XbvcPB  This program is available to members who need a ride to medical and dental visits. To schedule a ride, call [phone] or [phone] (toll free). TTY: 711. You can call 8 a.m. to 8 p.m. Seven days a week. Access to a representative may be limited at times.   One Pass  One Pass is your no-cost, complete, fitness solution for your mind and body. To learn more visit [employer]/fitness or call One Pass, toll-free [phone] (TTY: 126) 8 a.m. to 9 p.m. Monday-Friday.  Health Care Directive   This form helps you outline your health care wishes. You can request a form from me and I will answer any questions you have before you discuss it with your doctor.   Annual Physical  Take a key step on your path to good health and set up an annual physical at your clinic.  Questions?  Call me at [phone] Monday-Friday between 8am and 5pm.  TTY: 711. As we discussed, I plan to be in touch with you again in 6 months to follow up via phone.  Sincerely,    Chanda Pollack RN    E-mail: [email or web address]  Phone: [phone]      [employer]    cc: member records                                                                                             CB5 ([facility]) (5-2020)    Civil Rights Notice  Discrimination is against the law. [employer] does not discriminate on the basis of any of the following:    Race    Color    National Origin    Creed    Pentecostal    Age    Public Assistance Status    Receipt of Health Care Services    Disability (including physical or mental impairment)    Sex (including sex stereotypes and gender  identity)    Marital Status    Political Beliefs    Medical Condition    Genetic Information    Sexual Orientation    Claims Experience    Medical History    Health Status    Auxiliary Aids and Services:  [employer] provides auxiliary aids and services, like qualified interpreters or information in accessible formats, free of charge and in a timely manner, to ensure an equal opportunity to participate in our health care programs. Contact [employer] at [employer]/contact medicaid or call [phone] (toll free); TTY:712 or at [employer]/contactmedicaid.    Language Assistance Services:  [employer] provides translated documents and spoken language interpreting, free of charge and in a timely manner, when language assistance services are necessary to ensure limited English speakers have meaningful access to our information and services. Contact [employer] at [phone] (toll free); TTY: 343 or [employer]/contactmedicaid.     Civil Rights Complaints  You have the right to file a discrimination complaint if you believe you were treated in a discriminatory way by [employer]. You may contact any of the following four agencies directly to file a discrimination complaint.    U.S. Department of Health and Human Services  Office for Civil Rights (OCR)  You have the right to file a complaint with the OCR, a federal agency, if you believe you have been discriminated against because of any of the following:    Race    Disability    Color    Sex    National Origin    Age    Religious (in some cases)    Contact the OCR directly to file a complaint:         Director         U.S. Department of Health and Human Services  Office for Civil Rights         [address] [place] [address], [place] [address]         Customer Response Center: Toll-free: [phone]          TDD: [phone]         Email: [email or web address]    [place] Department of Human Rights (MD)  In [place], you have the right to file a  complaint with the MDHR if you believe you have been discriminated against because of any of the following:      Race    Color    National Origin    Mormon    Creed    Sex    Sexual Orientation    Marital Status    Public Assistance Status    Disability    Contact the MDHR directly to file a complaint:         [facility] of Human Rights         [address] [address]         [phone] (voice)          [phone] (toll free)         711 or [phone] ([place] Relay)         [phone] (Fax)         [email or web address]. (Email)     [place] Department of Human Services (DHS)  You have the right to file a complaint with [facility] if you believe you have been discriminated against in our health care programs because of any of the following:    Race    Color    National Origin    Creed    Mormon    Age    Public Assistance Status    Receipt of Health Care Services    Disability (including physical or mental impairment)    Sex (including sex stereotypes and gender identity)    Marital Status    Political Beliefs    Medical Condition    Genetic Information    Sexual Orientation    Claims Experience    Medical History    Health Status    Complaints must be in writing and filed within 180 days of the date you discovered the alleged discrimination. The complaint must contain your name and address and describe the discrimination you are complaining about. After we get your complaint, we will review it and notify you in writing about whether we have authority to investigate. If we do, we will investigate the complaint.      [facility] will notify you in writing of the investigation s outcome. You have a right to appeal the outcome if you disagree with the decision. To appeal, you must send a written request to have [facility] review the investigation outcome. Be brief and state why you disagree with the decision. Include additional information you think is important.      If you  file a complaint in this way, the people who work for the agency named in the complaint cannot retaliate against you. This means they cannot punish you in any way for filing a complaint. Filing a complaint in this way does not stop you from seeking out other legal or administration actions.     Contact DHS directly to file a discrimination complaint:        Civil Rights Coordinator        [place] Department of Human Services        Equal Opportunity and Access Division        [address] [address]        [place] [address] [phone] (voice) or use your preferred relay service     [facility] Complaint Notice   You have the right to file a complaint with [employer] if you believe you have been discriminated against because of any of the following:       Medical condition    Health status    Receipt of health care services    Claims experience    Medical history    Genetic information    Disability (including mental or physical impairment)    Marital status    Age    Sex (including sex stereotypes and gender identity)    Sexual orientation    National origin    Race    Color    Church    Creed    Public assistance status    Political beliefs    You can file a complaint and ask for help in filing a complaint in person or by mail, phone, fax, or email at:     [facility] Civil Rights Coordinator  [employer] [facility]  [address], [place] [address] [phone] (voice and fax) or TTA:829  Email: [email or web address]    American Indians can begin or continue to use [place] and  Health Services (IHS) clinics. We will not require prior approval or impose any conditions for you to get services at these clinics. For elders age 65 years and older this includes Elderly Waiver (EW) services accessed through the [place]. If a doctor or other provider in a [place] or IHS clinic refers you to a provider in our network, we will not require you to see your primary care provider prior to the  referral.

## 2022-08-18 NOTE — PROGRESS NOTES
[facility] Care Coordination Contact    Received after visit chart from care coordinator.  Completed following tasks: Mailed copy of care plan to client, Updated services in Database, Submitted referrals/auths for homemaking and ADC, Mailed copy of POC and PCA signature sheets for member to sign and return in SASE  and mailed [facility] Safe Medication Disposal   , Provider Signature - No POC Shared:  Member indicates that they do not want their POC shared with any EW providers.     and [facility]:  Faxed completed PCA assessment to PCA Agency and mailed copies to member.  Faxed MD Communication to PCP.  Emailed referral form for auth to [employer].    Chelsea Bejarano  Care Management Specialist  [facility]  [phone]

## 2022-08-18 NOTE — PROGRESS NOTES
[facility] Care Coordination Contact    MMIS completed and entered. Member remains a rate cell B. PCA assessment completed showing member continues to qualify for 3 hrs PCA daily. POC completed and chart to CMS for processing.  Chanda Pollack RN, PHN, [facility] Care Coordination  Lngvgx-[phone]. Xrvy-[phone]

## 2022-09-02 DIAGNOSIS — L23.9 DERMAL HYPERSENSITIVITY REACTION: ICD-10-CM

## 2022-09-03 ENCOUNTER — HEALTH MAINTENANCE LETTER (OUTPATIENT)
Age: 77
End: 2022-09-03

## 2022-09-05 DIAGNOSIS — I10 ESSENTIAL HYPERTENSION: ICD-10-CM

## 2022-09-07 RX ORDER — LOSARTAN POTASSIUM 100 MG/1
TABLET ORAL
Qty: 90 TABLET | Refills: 0 | Status: SHIPPED | OUTPATIENT
Start: 2022-09-07 | End: 2022-12-01

## 2022-09-07 RX ORDER — CETIRIZINE HYDROCHLORIDE 10 MG/1
TABLET ORAL
Qty: 90 TABLET | Refills: 0 | Status: SHIPPED | OUTPATIENT
Start: 2022-09-07 | End: 2022-12-01

## 2022-09-07 NOTE — TELEPHONE ENCOUNTER
Prescription approved per [facility] Refill Protocol.  Tammie Samano RN  [facility]

## 2022-09-07 NOTE — TELEPHONE ENCOUNTER
Routing refill request to provider for review/approval because:  Failed protocol:pt is 3-64 years of age  Kenisha CORDERO RN  [facility]

## 2022-09-12 ENCOUNTER — E-VISIT (OUTPATIENT)
Dept: FAMILY MEDICINE | Facility: CLINIC | Age: 77
End: 2022-09-12
Payer: COMMERCIAL

## 2022-09-12 DIAGNOSIS — L30.9 DERMATITIS: Primary | ICD-10-CM

## 2022-09-12 PROCEDURE — 99423 OL DIG E/M SVC 21+ MIN: CPT | Performed by: FAMILY MEDICINE

## 2022-09-12 RX ORDER — TRIAMCINOLONE ACETONIDE 1 MG/G
CREAM TOPICAL 2 TIMES DAILY
Qty: 80 G | Refills: 1 | Status: SHIPPED | OUTPATIENT
Start: 2022-09-12 | End: 2022-12-01

## 2022-09-12 RX ORDER — PREDNISONE 10 MG/1
TABLET ORAL
Qty: 20 TABLET | Refills: 0 | Status: SHIPPED | OUTPATIENT
Start: 2022-09-12 | End: 2022-12-01

## 2022-09-16 ENCOUNTER — TELEPHONE (OUTPATIENT)
Dept: FAMILY MEDICINE | Facility: CLINIC | Age: 77
End: 2022-09-16

## 2022-09-16 NOTE — TELEPHONE ENCOUNTER
Form placed in Dr. Turcios desk in a red folder.     Jenni Nice/Kelvin-  [facility]

## 2022-09-16 NOTE — TELEPHONE ENCOUNTER
Reason for Call:  Form, our goal is to have forms completed with 72 hours, however, some forms may require a visit or additional information.    Type of letter, form or note:  handicap    Who is the form from?: Inform pt 3-7 to complete form (if other please explain)    Where did the form come from: Patient or family brought in       What clinic location was the form placed at?: [facility]    Where the form was placed: Team 3 Box/Folder    What number is listed as a contact on the form?: [phone]       Additional comments: Inform pt 3-7 to complete form      Call taken on 9/16/2022 at 2:24 PM by Raven Garcia

## 2022-09-19 DIAGNOSIS — E78.5 HYPERLIPIDEMIA WITH TARGET LDL LESS THAN 130: ICD-10-CM

## 2022-09-19 DIAGNOSIS — I10 ESSENTIAL HYPERTENSION: ICD-10-CM

## 2022-09-20 NOTE — TELEPHONE ENCOUNTER
Spoke to daughter, patient will  the form at appointment for 9/26 with Dr. Turcios    Copy made for stat scanning.     Jenni Nice/Kelvin-  [facility]

## 2022-09-22 RX ORDER — CHLORTHALIDONE 25 MG/1
TABLET ORAL
Qty: 90 TABLET | Refills: 1 | Status: SHIPPED | OUTPATIENT
Start: 2022-09-22 | End: 2022-09-26

## 2022-09-22 NOTE — TELEPHONE ENCOUNTER
Routing refill request to provider for review/approval because:  Labs out of range:    Sodium   Date Value Ref Range Status   04/13/2022 132 (L) 133 - 144 mmol/L Final   04/09/2021 135 133 - 144 mmol/L Final     Rajesh ANTON RN, BSN

## 2022-09-26 ENCOUNTER — OFFICE VISIT (OUTPATIENT)
Dept: FAMILY MEDICINE | Facility: CLINIC | Age: 77
End: 2022-09-26
Payer: COMMERCIAL

## 2022-09-26 VITALS
OXYGEN SATURATION: 99 % | DIASTOLIC BLOOD PRESSURE: 77 MMHG | HEART RATE: 83 BPM | WEIGHT: 171 LBS | TEMPERATURE: 97.9 F | BODY MASS INDEX: 27.48 KG/M2 | HEIGHT: 66 IN | SYSTOLIC BLOOD PRESSURE: 121 MMHG

## 2022-09-26 DIAGNOSIS — I83.93 ASYMPTOMATIC VARICOSE VEINS OF BOTH LOWER EXTREMITIES: ICD-10-CM

## 2022-09-26 DIAGNOSIS — R63.0 POOR APPETITE: Primary | ICD-10-CM

## 2022-09-26 DIAGNOSIS — L30.0 NUMMULAR ECZEMA: ICD-10-CM

## 2022-09-26 DIAGNOSIS — I10 ESSENTIAL HYPERTENSION: ICD-10-CM

## 2022-09-26 PROCEDURE — 99214 OFFICE O/P EST MOD 30 MIN: CPT | Performed by: FAMILY MEDICINE

## 2022-09-26 RX ORDER — MIRTAZAPINE 7.5 MG/1
7.5 TABLET, FILM COATED ORAL AT BEDTIME
Qty: 30 TABLET | Refills: 1 | Status: SHIPPED | OUTPATIENT
Start: 2022-09-26 | End: 2022-10-20

## 2022-09-26 ASSESSMENT — PAIN SCALES - GENERAL: PAINLEVEL: NO PAIN (0)

## 2022-09-26 NOTE — PROGRESS NOTES
"  Assessment & Plan     Poor appetite  Has been worsening for last 2 months, will have him to try remeron  - mirtazapine (REMERON) 7.5 MG tablet; Take 1 tablet (7.5 mg) by mouth At Bedtime    Essential hypertension  Stable after stop thalidone, has no worsening swelling  Will have him to continue being off of it with close monitoring     Asymptomatic varicose veins of both lower extremities  Continue monitoring     Nummular eczema  Improving with triamcinolone, will have him to stop using it           FUTURE APPOINTMENTS:       - Follow-up visit in 2-3 months for BP and eczema     No follow-ups on file.    David Turcios MD  [facility] SUNI Veloz is a 77 year old presenting for the following health issues:  No chief complaint on file.      History of Present Illness       Reason for visit:  Itch    He eats 2-3 servings of fruits and vegetables daily.He consumes 0 sweetened beverage(s) daily.He exercises with enough effort to increase his heart rate 20 to 29 minutes per day.  He exercises with enough effort to increase his heart rate 7 days per week.   He is taking medications regularly.       Review of Systems   Constitutional, HEENT, cardiovascular, pulmonary, gi and gu systems are negative, except as otherwise noted.      Objective    /77   Pulse 83   Temp 97.9  F (36.6  C) (Temporal)   Ht 1.676 m (5' 6\")   Wt 77.6 kg (171 lb)   SpO2 99%   BMI 27.60 kg/m    Body mass index is 27.6 kg/m .  Physical Exam   GENERAL: healthy, alert and no distress  NECK: no adenopathy, no asymmetry, masses, or scars and thyroid normal to palpation  RESP: lungs clear to auscultation - no rales, rhonchi or wheezes  CV: regular rate and rhythm, normal S1 S2, no S3 or S4, no murmur, click or rub, no peripheral edema and peripheral pulses strong  ABDOMEN: soft, nontender, no hepatosplenomegaly, no masses and bowel sounds normal  MS: no gross musculoskeletal defects noted, no edema            "

## 2022-10-01 DIAGNOSIS — E78.2 MIXED HYPERLIPIDEMIA: ICD-10-CM

## 2022-10-04 RX ORDER — SIMVASTATIN 40 MG
TABLET ORAL
Qty: 90 TABLET | Refills: 1 | Status: SHIPPED | OUTPATIENT
Start: 2022-10-04 | End: 2023-03-14

## 2022-10-18 DIAGNOSIS — R63.0 POOR APPETITE: ICD-10-CM

## 2022-10-20 NOTE — TELEPHONE ENCOUNTER
Routing refill request to provider for review/approval because:  Drug interaction warning.    Tammie Samano RN  [facility] Triage

## 2022-10-22 RX ORDER — MIRTAZAPINE 7.5 MG/1
TABLET, FILM COATED ORAL
Qty: 90 TABLET | Refills: 0 | Status: SHIPPED | OUTPATIENT
Start: 2022-10-22 | End: 2022-12-01

## 2022-10-25 ENCOUNTER — PATIENT OUTREACH (OUTPATIENT)
Dept: GERIATRIC MEDICINE | Facility: CLINIC | Age: 77
End: 2022-10-25

## 2022-10-25 NOTE — LETTER
October 25, 2022    Important [employer] Information    MADY NICHOLE  [address]   [place] [address]    Your New Care Coordinator  Dear Mady,  My name is Fiorella Whitehead RN and I am your new Care Coordinator. You may reach me by calling [phone]. I will be in touch with you shortly to address any questions you may have.   I have also been in contact with Chanda Pollack RN, your previous care coordinator, to ensure a smooth transition.  Questions?  Call me at [phone] Monday-Friday between 8am and 5pm. TTY: [phone]. I look forward to working with you as a [employer]  member.  Sincerely,    Fiorella Whitehead RN    Phone: [phone]   E-mail: [email or web address]      cc: member records                                                                                                                        CB5 ([facility]) (5-2020)    Civil Rights Notice  Discrimination is against the law. [employer] does not discriminate on the basis of any of the following:    Race    Color    National Origin    Creed    Judaism    Age    Public Assistance Status    Receipt of Health Care Services    Disability (including physical or mental impairment)    Sex (including sex stereotypes and gender identity)    Marital Status    Political Beliefs    Medical Condition    Genetic Information    Sexual Orientation    Claims Experience    Medical History    Health Status    Auxiliary Aids and Services:  [employer] provides auxiliary aids and services, like qualified interpreters or information in accessible formats, free of charge and in a timely manner, to ensure an equal opportunity to participate in our health care programs. Contact [employer] at [employer]/contact medicaid or call [phone] (toll free); TTY:711 or at [employer]/contactmedicaid.    Language Assistance Services:  [employer] provides translated documents and spoken language interpreting, free of charge and in a timely manner, when language assistance services are  necessary to ensure limited English speakers have meaningful access to our information and services. Contact [employer] at [phone] (toll free); TTY: 711 or [email or web address].     Civil Rights Complaints  You have the right to file a discrimination complaint if you believe you were treated in a discriminatory way by [employer]. You may contact any of the following four agencies directly to file a discrimination complaint.        U.S. Department of Health and Human Services  Office for Civil Rights (OCR)  You have the right to file a complaint with the OCR, a federal agency, if you believe you have been discriminated against because of any of the following:    Race    Disability    Color    Sex    National Origin    Age    Orthodoxy (in some cases)    Contact the OCR directly to file a complaint:         Director         U.S. Department of Health and Human Services  Office for Civil Rights         [address] [place] [address]         Customer Response Center: Toll-free: [phone]          TDD: [phone]         Email: [email or web address]    [place] Department of Human Rights (MDHR)  In [place], you have the right to file a complaint with the MDHR if you believe you have been discriminated against because of any of the following:      Race    Color    National Origin    Orthodoxy    Creed    Sex    Sexual Orientation    Marital Status    Public Assistance Status    Disability    Contact the MDHR directly to file a complaint:         [place] Department of Human Rights         [address] [place] [address]         [phone] (voice)          [phone] (toll free)         711 or [phone] ([place] Relay)         [phone] (Fax)         [email or web address] (Email)     [place] Department of Human Services (DHS)  You have the right to file a complaint with [facility] if you believe you have been  discriminated against in our health care programs because of any of the following:    Race    Color    National Origin    Creed    Orthodox    Age    Public Assistance Status    Receipt of Health Care Services    Disability (including physical or mental impairment)    Sex (including sex stereotypes and gender identity)    Marital Status    Political Beliefs    Medical Condition    Genetic Information    Sexual Orientation    Claims Experience    Medical History    Health Status    Complaints must be in writing and filed within 180 days of the date you discovered the alleged discrimination. The complaint must contain your name and address and describe the discrimination you are complaining about. After we get your complaint, we will review it and notify you in writing about whether we have authority to investigate. If we do, we will investigate the complaint.      [facility] will notify you in writing of the investigation s outcome. You have a right to appeal the outcome if you disagree with the decision. To appeal, you must send a written request to have [facility] review the investigation outcome. Be brief and state why you disagree with the decision. Include additional information you think is important.      If you file a complaint in this way, the people who work for the agency named in the complaint cannot retaliate against you. This means they cannot punish you in any way for filing a complaint. Filing a complaint in this way does not stop you from seeking out other legal or administration actions.     Contact [facility] directly to file a discrimination complaint:        Civil Rights Coordinator        [place] Department of Human Services        Equal Opportunity and Access Division        [address] [address]        [place] [address] [phone] (voice) or use your preferred relay service     [facility] Complaint Notice   You have the right to file a complaint with [employer] if you believe you have been discriminated against  because of any of the following:       Medical condition    Health status    Receipt of health care services    Claims experience    Medical history    Genetic information    Disability (including mental or physical impairment)    Marital status    Age    Sex (including sex stereotypes and gender identity)    Sexual orientation    National origin    Race    Color    Taoist    Creed    Public assistance status    Political beliefs    You can file a complaint and ask for help in filing a complaint in person or by mail, phone, fax, or email at:     [facility] Civil Rights Coordinator  [employer] [facility]  [address], [place] [address] [phone] (voice and fax) or TTY:998  Email: [email or web address]    American Indians can begin or continue to use [facility] and [facility] ([facility]) clinics. We will not require prior approval or impose any conditions for you to get services at these clinics. For elders age 65 years and older this includes Elderly Waiver (EW) services accessed through the [place]. If a doctor or other provider in a [facility] or [facility] clinic refers you to a provider in our network, we will not require you to see your primary care provider prior to the referral.

## 2022-10-25 NOTE — PROGRESS NOTES
[facility] Care Coordination Contact    Internal CC change effective 11/1/2022.  Mailed member CC Change letter.  Additional tasks to be completed by CMS include: update database & EPIC, enter CC Change in MMIS, and move member files on Q drive.    Nae Wilkes  Case Management Specialist  [facility]  [phone]

## 2022-11-02 ENCOUNTER — TELEPHONE (OUTPATIENT)
Dept: FAMILY MEDICINE | Facility: CLINIC | Age: 77
End: 2022-11-02

## 2022-11-02 NOTE — TELEPHONE ENCOUNTER
Order/Referral Request    Who is requesting: [facility]    Orders being requested: HHC and POC 3/23/22-5/21/22    Reason service is needed/diagnosis: continued services    When are orders needed by: when able    Has this been discussed with Provider: Yes    Placed forms in red folder and on Dr. Turcios desk, fax completed forms to [phone]    Jenni Nice/Kel[employer]-  [facility]

## 2022-11-03 NOTE — TELEPHONE ENCOUNTER
Form completed and signed, faxed, and sent to [employer].    Janessa Nice/   [facility] Villanueva

## 2022-11-08 ENCOUNTER — PATIENT OUTREACH (OUTPATIENT)
Dept: GERIATRIC MEDICINE | Facility: CLINIC | Age: 77
End: 2022-11-08

## 2022-11-08 NOTE — PROGRESS NOTES
[facility] Care Coordination Contact    New care coordinator called member to introduce self. Daughter, Emilee, answered and states member is not home. CC gave new number member and family can call if there are any questions, concerns, or requests.    Fiorella Whitehead RN  [facility]  [phone]

## 2022-11-29 ASSESSMENT — PATIENT HEALTH QUESTIONNAIRE - PHQ9
SUM OF ALL RESPONSES TO PHQ QUESTIONS 1-9: 3
10. IF YOU CHECKED OFF ANY PROBLEMS, HOW DIFFICULT HAVE THESE PROBLEMS MADE IT FOR YOU TO DO YOUR WORK, TAKE CARE OF THINGS AT HOME, OR GET ALONG WITH OTHER PEOPLE: NOT DIFFICULT AT ALL
SUM OF ALL RESPONSES TO PHQ QUESTIONS 1-9: 3

## 2022-11-29 ASSESSMENT — ENCOUNTER SYMPTOMS
NAUSEA: 0
CONSTIPATION: 0
DIZZINESS: 0
DYSURIA: 0
PALPITATIONS: 0
HEADACHES: 0
SHORTNESS OF BREATH: 0
CHILLS: 0
PARESTHESIAS: 0
ARTHRALGIAS: 0
SORE THROAT: 0
NERVOUS/ANXIOUS: 0
HEMATURIA: 0
FEVER: 0
WEAKNESS: 0
JOINT SWELLING: 0
FREQUENCY: 0
HEARTBURN: 0
MYALGIAS: 0
COUGH: 0
HEMATOCHEZIA: 0
EYE PAIN: 0
DIARRHEA: 0
ABDOMINAL PAIN: 0

## 2022-11-29 ASSESSMENT — ACTIVITIES OF DAILY LIVING (ADL)
CURRENT_FUNCTION: BATHING REQUIRES ASSISTANCE
CURRENT_FUNCTION: PREPARING MEALS REQUIRES ASSISTANCE
CURRENT_FUNCTION: HOUSEWORK REQUIRES ASSISTANCE
CURRENT_FUNCTION: TRANSPORTATION REQUIRES ASSISTANCE
CURRENT_FUNCTION: SHOPPING REQUIRES ASSISTANCE
CURRENT_FUNCTION: MEDICATION ADMINISTRATION REQUIRES ASSISTANCE
CURRENT_FUNCTION: LAUNDRY REQUIRES ASSISTANCE

## 2022-12-01 ENCOUNTER — OFFICE VISIT (OUTPATIENT)
Dept: FAMILY MEDICINE | Facility: CLINIC | Age: 77
End: 2022-12-01
Payer: COMMERCIAL

## 2022-12-01 VITALS
TEMPERATURE: 97.7 F | HEIGHT: 66 IN | HEART RATE: 74 BPM | SYSTOLIC BLOOD PRESSURE: 137 MMHG | RESPIRATION RATE: 16 BRPM | DIASTOLIC BLOOD PRESSURE: 86 MMHG | OXYGEN SATURATION: 94 % | WEIGHT: 169.2 LBS | BODY MASS INDEX: 27.19 KG/M2

## 2022-12-01 DIAGNOSIS — K59.01 SLOW TRANSIT CONSTIPATION: ICD-10-CM

## 2022-12-01 DIAGNOSIS — L30.0 NUMMULAR ECZEMA: ICD-10-CM

## 2022-12-01 DIAGNOSIS — Z12.5 SCREENING FOR PROSTATE CANCER: ICD-10-CM

## 2022-12-01 DIAGNOSIS — L23.9 DERMAL HYPERSENSITIVITY REACTION: ICD-10-CM

## 2022-12-01 DIAGNOSIS — Z00.00 HEALTH MAINTENANCE EXAMINATION: Primary | ICD-10-CM

## 2022-12-01 DIAGNOSIS — I10 ESSENTIAL HYPERTENSION: ICD-10-CM

## 2022-12-01 DIAGNOSIS — M65.351 TRIGGER LITTLE FINGER OF RIGHT HAND: ICD-10-CM

## 2022-12-01 LAB
ALBUMIN SERPL BCG-MCNC: 4.6 G/DL (ref 3.5–5.2)
ALP SERPL-CCNC: 65 U/L (ref 40–129)
ALT SERPL W P-5'-P-CCNC: 28 U/L (ref 10–50)
ANION GAP SERPL CALCULATED.3IONS-SCNC: 10 MMOL/L (ref 7–15)
AST SERPL W P-5'-P-CCNC: 33 U/L (ref 10–50)
BILIRUB SERPL-MCNC: 1.1 MG/DL
BUN SERPL-MCNC: 13.7 MG/DL (ref 8–23)
CALCIUM SERPL-MCNC: 9.2 MG/DL (ref 8.8–10.2)
CHLORIDE SERPL-SCNC: 103 MMOL/L (ref 98–107)
CHOLEST SERPL-MCNC: 153 MG/DL
CREAT SERPL-MCNC: 0.96 MG/DL (ref 0.67–1.17)
DEPRECATED HCO3 PLAS-SCNC: 27 MMOL/L (ref 22–29)
ERYTHROCYTE [DISTWIDTH] IN BLOOD BY AUTOMATED COUNT: 15.3 % (ref 10–15)
GFR SERPL CREATININE-BSD FRML MDRD: 81 ML/MIN/1.73M2
GLUCOSE SERPL-MCNC: 88 MG/DL (ref 70–99)
HCT VFR BLD AUTO: 50.1 % (ref 40–53)
HDLC SERPL-MCNC: 39 MG/DL
HGB BLD-MCNC: 16.8 G/DL (ref 13.3–17.7)
LDLC SERPL CALC-MCNC: 79 MG/DL
MCH RBC QN AUTO: 29.6 PG (ref 26.5–33)
MCHC RBC AUTO-ENTMCNC: 33.5 G/DL (ref 31.5–36.5)
MCV RBC AUTO: 88 FL (ref 78–100)
NONHDLC SERPL-MCNC: 114 MG/DL
PLATELET # BLD AUTO: 196 10E3/UL (ref 150–450)
POTASSIUM SERPL-SCNC: 4.4 MMOL/L (ref 3.4–5.3)
PROT SERPL-MCNC: 7.8 G/DL (ref 6.4–8.3)
PSA SERPL-MCNC: 3.25 NG/ML (ref 0–6.5)
RBC # BLD AUTO: 5.67 10E6/UL (ref 4.4–5.9)
SODIUM SERPL-SCNC: 140 MMOL/L (ref 136–145)
TRIGL SERPL-MCNC: 177 MG/DL
WBC # BLD AUTO: 7.4 10E3/UL (ref 4–11)

## 2022-12-01 PROCEDURE — G0103 PSA SCREENING: HCPCS | Performed by: FAMILY MEDICINE

## 2022-12-01 PROCEDURE — 80053 COMPREHEN METABOLIC PANEL: CPT | Performed by: FAMILY MEDICINE

## 2022-12-01 PROCEDURE — 80061 LIPID PANEL: CPT | Performed by: FAMILY MEDICINE

## 2022-12-01 PROCEDURE — 99397 PER PM REEVAL EST PAT 65+ YR: CPT | Performed by: FAMILY MEDICINE

## 2022-12-01 PROCEDURE — 36415 COLL VENOUS BLD VENIPUNCTURE: CPT | Performed by: FAMILY MEDICINE

## 2022-12-01 PROCEDURE — 85027 COMPLETE CBC AUTOMATED: CPT | Performed by: FAMILY MEDICINE

## 2022-12-01 PROCEDURE — 99214 OFFICE O/P EST MOD 30 MIN: CPT | Mod: 25 | Performed by: FAMILY MEDICINE

## 2022-12-01 RX ORDER — LOSARTAN POTASSIUM 100 MG/1
100 TABLET ORAL DAILY
Qty: 90 TABLET | Refills: 3 | Status: SHIPPED | OUTPATIENT
Start: 2022-12-01 | End: 2023-09-19

## 2022-12-01 RX ORDER — POLYETHYLENE GLYCOL 3350 17 G/17G
1 POWDER, FOR SOLUTION ORAL DAILY
Qty: 500 G | Refills: 3 | Status: SHIPPED | OUTPATIENT
Start: 2022-12-01

## 2022-12-01 RX ORDER — CETIRIZINE HYDROCHLORIDE 10 MG/1
10 TABLET ORAL DAILY
Qty: 90 TABLET | Refills: 3 | Status: SHIPPED | OUTPATIENT
Start: 2022-12-01 | End: 2023-09-19

## 2022-12-01 RX ORDER — MOMETASONE FUROATE 1 MG/G
CREAM TOPICAL DAILY
Qty: 50 G | Refills: 4 | Status: SHIPPED | OUTPATIENT
Start: 2022-12-01 | End: 2024-02-01

## 2022-12-01 ASSESSMENT — ENCOUNTER SYMPTOMS
ABDOMINAL PAIN: 0
PARESTHESIAS: 0
SORE THROAT: 0
HEMATURIA: 0
DIARRHEA: 0
COUGH: 0
HEADACHES: 0
CHILLS: 0
NAUSEA: 0
SHORTNESS OF BREATH: 0
ARTHRALGIAS: 0
EYE PAIN: 0
FREQUENCY: 0
NERVOUS/ANXIOUS: 0
MYALGIAS: 0
CONSTIPATION: 0
FEVER: 0
HEARTBURN: 0
JOINT SWELLING: 0
DIZZINESS: 0
HEMATOCHEZIA: 0
WEAKNESS: 0
PALPITATIONS: 0
DYSURIA: 0

## 2022-12-01 ASSESSMENT — ACTIVITIES OF DAILY LIVING (ADL)
CURRENT_FUNCTION: MEDICATION ADMINISTRATION REQUIRES ASSISTANCE
CURRENT_FUNCTION: PREPARING MEALS REQUIRES ASSISTANCE
CURRENT_FUNCTION: SHOPPING REQUIRES ASSISTANCE
CURRENT_FUNCTION: HOUSEWORK REQUIRES ASSISTANCE
CURRENT_FUNCTION: LAUNDRY REQUIRES ASSISTANCE
CURRENT_FUNCTION: BATHING REQUIRES ASSISTANCE
CURRENT_FUNCTION: TRANSPORTATION REQUIRES ASSISTANCE

## 2022-12-01 ASSESSMENT — PAIN SCALES - GENERAL: PAINLEVEL: MODERATE PAIN (4)

## 2022-12-01 NOTE — PROGRESS NOTES
{PROVIDER CHARTING PREFERENCE:195493}    Subjective   Limagusto is a 77 year old{ACCOMPANIED BY STATEMENT (Optional):569139}, presenting for the following health issues:  No chief complaint on file.      HPI     {SUPERLIST (Optional):487843}  {additonal problems for provider to add (Optional):530768}    Review of Systems   {ROS COMP (Optional):680280}      Objective    There were no vitals taken for this visit.  There is no height or weight on file to calculate BMI.  Physical Exam   {Exam List (Optional):442057}    {Diagnostic Test Results (Optional):059514}    {AMBULATORY ATTESTATION (Optional):985263}

## 2022-12-01 NOTE — PROGRESS NOTES
"SUBJECTIVE:   Roselia is a 77 year old who presents for Preventive Visit.    Patient has been advised of split billing requirements and indicates understanding: Yes  Are you in the first 12 months of your Medicare coverage?  No    Healthy Habits:     In general, how would you rate your overall health?  Good    Frequency of exercise:  4-5 days/week    Duration of exercise:  15-30 minutes    Do you usually eat at least 4 servings of fruit and vegetables a day, include whole grains    & fiber and avoid regularly eating high fat or \"junk\" foods?  Yes    Taking medications regularly:  Yes    Medication side effects:  None    Ability to successfully perform activities of daily living:  Transportation requires assistance, shopping requires assistance, preparing meals requires assistance, housework requires assistance, bathing requires assistance, laundry requires assistance and medication administration requires assistance    Home Safety:  No safety concerns identified    Hearing Impairment:  No hearing concerns    In the past 6 months, have you been bothered by leaking of urine?  No    In general, how would you rate your overall mental or emotional health?  Good      PHQ-2 Total Score: 3    Additional concerns today:  No      Have you ever done Advance Care Planning? (For example, a Health Directive, POLST, or a discussion with a medical provider or your loved ones about your wishes): No, advance care planning information given to patient to review.  Patient declined advance care planning discussion at this time.       Fall risk  Fallen 2 or more times in the past year?: Yes  Any fall with injury in the past year?: No    Cognitive Screening   1) Repeat 3 items (Leader, Season, Table)    2) Clock draw: NORMAL  3) 3 item recall: Recalls 3 objects  Results: 3 items recalled: COGNITIVE IMPAIRMENT LESS LIKELY    Mini-CogTM Copyright RIANA Root. Licensed by the author for use in [facility]; reprinted with permission " (shane@[email or web address]). All rights reserved.      Do you have sleep apnea, excessive snoring or daytime drowsiness?: no    Reviewed and updated as needed this visit by clinical staff                  Reviewed and updated as needed this visit by Provider                 Social History     Tobacco Use     Smoking status: Former     Packs/day: 0.30     Years: 20.00     Pack years: 6.00     Types: Cigarettes     Smokeless tobacco: Never   Substance Use Topics     Alcohol use: No     Alcohol/week: 0.0 standard drinks     If you drink alcohol do you typically have >3 drinks per day or >7 drinks per week? No    Alcohol Use 11/29/2022   Prescreen: >3 drinks/day or >7 drinks/week? No   Prescreen: >3 drinks/day or >7 drinks/week? -         Current providers sharing in care for this patient include:   Patient Care Team:  David Turcios MD as PCP - General (Family Practice)  David Turcios MD as Assigned PCP  Alicia Whitehead RN as Lead Care Coordinator    The following health maintenance items are reviewed in Epic and correct as of today:  Health Maintenance   Topic Date Due     LUNG CANCER SCREENING  Never done     COVID-19 Vaccine (4 - Booster for Pfizer series) 12/07/2021     INFLUENZA VACCINE (1) 09/01/2022     MEDICARE ANNUAL WELLNESS VISIT  10/12/2022     ANNUAL REVIEW OF HM ORDERS  09/26/2023     FALL RISK ASSESSMENT  12/01/2023     COLORECTAL CANCER SCREENING  07/23/2025     LIPID  10/12/2026     ADVANCE CARE PLANNING  10/12/2026     DTAP/TDAP/TD IMMUNIZATION (3 - Td or Tdap) 03/21/2029     PHQ-2 (once per calendar year)  Completed     Pneumococcal Vaccine: 65+ Years  Completed     ZOSTER IMMUNIZATION  Completed     IPV IMMUNIZATION  Aged Out     MENINGITIS IMMUNIZATION  Aged Out     HEPATITIS C SCREENING  Discontinued     BP Readings from Last 3 Encounters:   12/01/22 137/86   09/26/22 121/77   04/13/22 128/70    Wt Readings from Last 3 Encounters:   12/01/22 76.7 kg (169 lb 3.2 oz)   09/26/22 77.6 kg (171 lb)   04/13/22 78 kg  (172 lb)                  Patient Active Problem List   Diagnosis     Smoking     Dyspepsia     Asymptomatic varicose veins of both lower extremities     Hyperlipidemia LDL goal <130     H. pylori infection     Vertigo, benign positional     Essential hypertension     Hyperlipidemia with target LDL less than 130     Advanced directives, counseling/discussion     Left knee pain     Benign neoplasm of colon     No past surgical history on file.    Social History     Tobacco Use     Smoking status: Former     Packs/day: 0.30     Years: 20.00     Pack years: 6.00     Types: Cigarettes     Smokeless tobacco: Never   Substance Use Topics     Alcohol use: No     Alcohol/week: 0.0 standard drinks     Family History   Problem Relation Age of Onset     Hypertension Father      Cerebrovascular Disease Father      Hypertension Brother          Current Outpatient Medications   Medication Sig Dispense Refill     aspirin (ASA) 81 MG chewable tablet Take 81 mg by mouth daily       calcium carbonate 500 MG tablet Take 1 tablet by mouth daily Client taking it twice a day 100 tablet 3     cetirizine (ZYRTEC) 10 MG tablet Take 1 tablet (10 mg) by mouth daily 90 tablet 3     fish oil-omega-3 fatty acids 1000 MG capsule Take 2 capsules by mouth daily. 180 capsule 12     losartan (COZAAR) 100 MG tablet Take 1 tablet (100 mg) by mouth daily 90 tablet 3     mometasone (ELOCON) 0.1 % external cream Apply topically daily 50 g 4     Multiple Vitamins-Minerals (ONE-A-DAY MENS 50+ ADVANTAGE PO)        Multiple Vitamins-Minerals (PRESERVISION AREDS 2) CAPS        polyethylene glycol (MIRALAX) 17 GM/Dose powder Take 17 g (1 capful) by mouth daily 500 g 3     simvastatin (ZOCOR) 40 MG tablet TAKE 1 TABLET BY MOUTH EVERYDAY AT BEDTIME 90 tablet 1     UNABLE TO FIND MEDICATION NAME: PreserVision AREDS 2 formula soft gel. 1 soft gel twice daily       Allergies   Allergen Reactions     Penicillins      Sulfa Drugs Hives     Sulphasomidine       "Tetracycline      Recent Labs   Lab Test 04/13/22  1306 10/12/21  1120 04/09/21  1058 10/01/20  1153 02/25/20  1002 09/24/19  1036   LDL 56 104* 128* 57   < > 58   HDL  --  37*  --  41  --  37*   TRIG  --  219*  --  205*  --  223*   ALT  --  75* 59 41   < > 32   CR 0.84 0.85 0.88 0.97   < > 0.86   GFRESTIMATED 89 84 83 75   < > 85   GFRESTBLACK  --   --  >90 87   < > >90   POTASSIUM 3.4 3.6 4.0 4.4   < > 4.3    < > = values in this interval not displayed.      Pneumonia Vaccine:For adults with an immunocompromising condition, cerebrospinal fluid leak, or cochlear implant, administer 1 dose of PCV13 first then give 1 dose of PPSV23 at least 1 year later. Anyone who received any doses of PPSV23 before age 65 should receive 1 final dose of the vaccine at age 65 or older. Administer this last dose at least 5 years after the prior PPSV23 dose.        Review of Systems   Constitutional: Negative for chills and fever.   HENT: Negative for congestion, ear pain, hearing loss and sore throat.    Eyes: Negative for pain and visual disturbance.   Respiratory: Negative for cough and shortness of breath.    Cardiovascular: Negative for chest pain, palpitations and peripheral edema.   Gastrointestinal: Negative for abdominal pain, constipation, diarrhea, heartburn, hematochezia and nausea.   Genitourinary: Negative for dysuria, frequency, genital sores, hematuria, impotence, penile discharge and urgency.   Musculoskeletal: Negative for arthralgias, joint swelling and myalgias.   Skin: Positive for rash.   Neurological: Negative for dizziness, weakness, headaches and paresthesias.   Psychiatric/Behavioral: Negative for mood changes. The patient is not nervous/anxious.          OBJECTIVE:   /86 (BP Location: Left arm, Patient Position: Sitting, Cuff Size: Adult Large)   Pulse 74   Temp 97.7  F (36.5  C) (Temporal)   Resp 16   Ht 1.676 m (5' 5.98\")   Wt 76.7 kg (169 lb 3.2 oz)   SpO2 94%   BMI 27.32 kg/m   Estimated body " "mass index is 27.32 kg/m  as calculated from the following:    Height as of this encounter: 1.676 m (5' 5.98\").    Weight as of this encounter: 76.7 kg (169 lb 3.2 oz).  Physical Exam  GENERAL: healthy, alert and no distress  EYES: Eyes grossly normal to inspection, PERRL and conjunctivae and sclerae normal  HENT: ear canals and TM's normal, nose and mouth without ulcers or lesions  NECK: no adenopathy, no asymmetry, masses, or scars and thyroid normal to palpation  RESP: lungs clear to auscultation - no rales, rhonchi or wheezes  CV: regular rate and rhythm, normal S1 S2, no S3 or S4, no murmur, click or rub, no peripheral edema and peripheral pulses strong  ABDOMEN: soft, nontender, no hepatosplenomegaly, no masses and bowel sounds normal  MS: no gross musculoskeletal defects noted, no edema  SKIN: no suspicious lesions or rashes  NEURO: Normal strength and tone, mentation intact and speech normal  BACK: no CVA tenderness, no paralumbar tenderness  PSYCH: mentation appears normal, affect normal/bright        ASSESSMENT / PLAN:     (Z00.00) Health maintenance examination  (primary encounter diagnosis)  Plan: CBC with platelets, Comprehensive metabolic         panel (BMP + Alb, Alk Phos, ALT, AST, Total.         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.9) Dermal hypersensitivity reaction  Plan: cetirizine (ZYRTEC) 10 MG tablet            (Z12.5) Screening for prostate cancer  Plan: PSA, screen            (M65.351) Trigger little finger of right hand  Comment: has been worsening with frequent finger movement, will have him to try OT  If not improving, will consider intralesional injection   Plan: Occupational Therapy Referral            (L30.0) Nummular eczema  Comment: worsening for last 2 month with itching, will have him to try topical steroid cream   Plan: mometasone (ELOCON) 0.1 % " "external cream            (K59.01) Slow transit constipation  Comment: worsening for last 2 months, has no blood nor abd pain  Will have him to try miralax   Plan: polyethylene glycol (MIRALAX) 17 GM/Dose powder              Patient has been advised of split billing requirements and indicates understanding: Yes      COUNSELING:  Reviewed preventive health counseling, as reflected in patient instructions      BMI:   Estimated body mass index is 27.32 kg/m  as calculated from the following:    Height as of this encounter: 1.676 m (5' 5.98\").    Weight as of this encounter: 76.7 kg (169 lb 3.2 oz).   Weight management plan: Discussed healthy diet and exercise guidelines      He reports that he has quit smoking. His smoking use included cigarettes. He has a 6.00 pack-year smoking history. He has never used smokeless tobacco.      Appropriate preventive services were discussed with this patient, including applicable screening as appropriate for cardiovascular disease, diabetes, osteopenia/osteoporosis, and glaucoma.  As appropriate for age/gender, discussed screening for colorectal cancer, prostate cancer, breast cancer, and cervical cancer. Checklist reviewing preventive services available has been given to the patient.    Reviewed patients plan of care and provided an AVS. The Basic Care Plan (routine screening as documented in Health Maintenance) for [place] meets the Care Plan requirement. This Care Plan has been established and reviewed with the Patient.          David Turcios MD  [facility]    Identified Health Risks:  "

## 2023-01-19 NOTE — PROGRESS NOTES
Hand Therapy Initial Evaluation  Current Date:  1/23/2023    Diagnosis: Right Small Finger Trigger  DOI: 10/2022 (MD order 12/1/2022)  Post:  3 Months    Precautions: N/A    Subjective:  Roselia العراقي is a 78 year old male.    Patient reports symptoms of the right small finger which occurred due to unsure per patient report. Since onset symptoms are Unchanged  General health as reported by patient is fair.  Pertinent medical history includes:High Blood Pressure  Medical allergies:sulfa, teracyline.  Surgical history: none.  Medication history: High Blood Pressure.    Current occupation is retired    Occupational Profile Information:  Right hand dominant  Prior functional level:  no limitations  Patient reports symptoms of pain, stiffness/loss of motion and weakness/loss of strength  Special tests:  x-ray.    Previous treatment: none  Barriers include:none  Mobility: No difficulty  Leisure activities/hobbies: cooking  Other: Lives with wife and daughter    Functional Outcome Measure:   Upper Extremity Functional Index Score:  SCORE:   Column Totals: /80: (P) 48   (A lower score indicates greater disability.)    Objective  Pain Level (Scale 0-10)   1/23/2023   At Rest 0   With Use 5     Pain Description  Date 1/23/2023   Location Right Small Finger   Pain Quality Triggering, sore, stiff, painful   Frequency Daytime, daily, intermittent   Pain is worst  daytime   Exacerbated by gripping   Relieved by rest   Progression unchanged     Sensation   WNL throughout all nerve distributions; per patient report  Patient reports slight tingling in hand upon waking - non related to present treatment problem    Scar   NA    Edema (Circumference measured in cm)   1/23/2023 1/23/2023   Small Left Right   P1 5.9 6.3   PIP 5.7 6.1   P2 5.2 5.6     ROM  Small Finger 1/23/2023 1/23/2023   AROM (PROM) Left Right   MCP 0/88 0/60   PIP 0/92 0/67   DIP -5/91 0/60   FOSTER       Stage of Stenosing Tenosynovitis (SST)     1/23/2023   R Small  Finger Stage 3   Stage 1:  Normal  Stage 2:  Uneven motion of tendon  Stage 3:  Triggering, clicking, catching  Stage 4:  Locking in extension or flexion; unlocked by active motion  Stage 5:  Locking in extension or flexion; unlocked by passive motion  Stage 6:  Finger locked in extension or flexion    Strength   (Measured in pounds)  Pain Report:  - none  + mild    ++ moderate    +++ severe    1/23/2023 1/23/2023   Trials Left Right   1  2  3 48 50+   Average 48 50     Palpation  Pain Report:  - none  + mild    ++ moderate    +++ severe   R Small Finger 1/23/2023   A1 Pulley ++   A3 Pulley/PIP -   CMC NT   FA Flexors NT   FA Extensors NT     Assessment:  Patient presents with symptoms consistent with diagnosis of right small finger trigger, with conservative intervention.     Patient's limitations or Problem List includes:  Pain, Decreased ROM/motion, Increased edema, Weakness, Decreased , Decreased coordination, Decreased dexterity and Tightness in musculature of the right small finger which interferes with the patient's ability to perform Self Care Tasks (dressing, eating, bathing, hygiene/toileting), Recreational Activities and Household Chores as compared to previous level of function.    Rehab Potential:  Good - Return to full activity, some limitations    Patient will benefit from skilled Occupational Therapy to increase ROM, flexibility,  strength, coordination and dexterity and decrease pain and edema to return to previous activity level and resume normal daily tasks and to reach their rehab potential.    Barriers to Learning:  No barrier    Communication Issues:  Patient appears to be able to clearly communicate and understand verbal and written communication and follow directions correctly.    Chart Review: Chart Review, Detailed history review with patient and Detailed history review with family / caregivers    Identified Performance Deficits: dressing, hygiene and grooming, home  establishment and management, meal preparation and cleanup and leisure activities    Assessment of Occupational Performance:  5 or more Performance Deficits    Clinical Decision Making (Complexity): Low complexity    Treatment Explanation:  The following has been discussed with the patient:  RX ordered/plan of care  Anticipated outcomes  Possible risks and side effects    Plan:  Frequency:  1 X week, once daily  Duration:  for 8 weeks    Treatment Plan:    Modalities:    US and Laser Light   Therapeutic Exercise:    AROM, AAROM, PROM, Tendon Gliding and Blocking  Neuromuscular re-ed:   Kinesiotaping  Manual Techniques:   Friction massage and Myofascial release  Orthotic Fabrication:    Finger based and Hand based  Self Care:    Self Care Tasks    Discharge Plan:  Achieve all LTG.  Independent in home treatment program.  Reach maximal therapeutic benefit.    Home Exercise Program:  Trigger Finger Precautions  Anti -trigger splint  FM to SF A1 pulley  Extensor tracking on table  PROM finger flexion  Ice    Next Visit:  Check response to HEP and splint, consider extension gutter for R SF PRN for night  US  FM  PROM  Progress ROM (blocking) when able to do without triggering

## 2023-01-23 ENCOUNTER — THERAPY VISIT (OUTPATIENT)
Dept: OCCUPATIONAL THERAPY | Facility: CLINIC | Age: 78
End: 2023-01-23
Payer: COMMERCIAL

## 2023-01-23 ENCOUNTER — PATIENT OUTREACH (OUTPATIENT)
Dept: GERIATRIC MEDICINE | Facility: CLINIC | Age: 78
End: 2023-01-23

## 2023-01-23 DIAGNOSIS — M65.30 TRIGGER FINGER, ACQUIRED: ICD-10-CM

## 2023-01-23 DIAGNOSIS — M79.644 PAIN OF FINGER OF RIGHT HAND: ICD-10-CM

## 2023-01-23 PROCEDURE — 97110 THERAPEUTIC EXERCISES: CPT | Mod: GO | Performed by: OCCUPATIONAL THERAPIST

## 2023-01-23 PROCEDURE — 97760 ORTHOTIC MGMT&TRAING 1ST ENC: CPT | Mod: GO | Performed by: OCCUPATIONAL THERAPIST

## 2023-01-23 PROCEDURE — 97165 OT EVAL LOW COMPLEX 30 MIN: CPT | Mod: GO | Performed by: OCCUPATIONAL THERAPIST

## 2023-01-24 NOTE — PROGRESS NOTES
SOAP Note Objective Information for 1/30/2023    Diagnosis: Right Small Finger Trigger  DOI: 10/2022 (MD order 12/1/2022)  Post:  3 Months    Pain Level (Scale 0-10)   1/23/2023 1/30/23   At Rest 0 0   With Use 5 3-4     Pain Description  Date 1/23/2023   Location Right Small Finger   Pain Quality Triggering, sore, stiff, painful   Frequency Daytime, daily, intermittent   Pain is worst  daytime   Exacerbated by gripping   Relieved by rest   Progression unchanged     Edema (Circumference measured in cm)   1/23/2023 1/23/2023 1/30/23   Small Left Right Right   P1 5.9 6.3 6.1   PIP 5.7 6.1 5.7   P2 5.2 5.6 5.2     Home Exercise Program:  Trigger Finger Precautions  Anti -trigger splint  FM to SF A1 pulley  Extensor tracking on table  PROM finger flexion  Ice    Next Visit:  US  FM  PROM  Progress ROM (blocking) when able to do without triggering     Please refer to the daily flowsheet for treatment today, total treatment time and time spent performing 1:1 timed codes.

## 2023-01-26 NOTE — PROGRESS NOTES
[facility]    OUTPATIENT Occupational Therapy ORTHOPEDIC EVALUATION  PLAN OF TREATMENT FOR OUTPATIENT REHABILITATION  (COMPLETE FOR INITIAL CLAIMS ONLY)  Patient's Last Name, First Name, M.I.  YOB: 1945  Roselia العراقي       Provider s Name:  [facility]   Medical Record No.  [id number]   Start of Care Date:  01/23/23   Onset Date:   12/01/22 (MD order)   Treatment Diagnosis:  R Small Finger Trigger Medical Diagnosis:     Pain of finger of right hand  Trigger finger, acquired       Goals:     01/23/23 0500   Goal #1   Goal #1 household chores   Previous Performance Level Independent   Current Functional Task    Current Performance Level 5/10 pain   STG Target Perfomance Shopping bag   STG Target Perform Level 3/10 pain   Due Date 02/21/23   LTG Target Task/Performance Independent   Due Date 04/22/23         Therapy Frequency:  1 x Week  Predicted Duration of Therapy Intervention:  8 Weeks    Nae Dias, OT                 I CERTIFY THE NEED FOR THESE SERVICES FURNISHED UNDER        THIS PLAN OF TREATMENT AND WHILE UNDER MY CARE     (Physician attestation of this document indicates review and certification of the therapy plan).                     Certification Date From:  01/23/23   Certification Date To:  03/23/23    Referring Provider:  David Turcios    Initial Assessment        See Epic Evaluation SOC Date: 01/23/23

## 2023-01-30 ENCOUNTER — THERAPY VISIT (OUTPATIENT)
Dept: OCCUPATIONAL THERAPY | Facility: CLINIC | Age: 78
End: 2023-01-30
Payer: COMMERCIAL

## 2023-01-30 DIAGNOSIS — M65.30 TRIGGER FINGER, ACQUIRED: ICD-10-CM

## 2023-01-30 DIAGNOSIS — M79.644 PAIN OF FINGER OF RIGHT HAND: Primary | ICD-10-CM

## 2023-01-30 PROCEDURE — 97140 MANUAL THERAPY 1/> REGIONS: CPT | Mod: GO | Performed by: OCCUPATIONAL THERAPIST

## 2023-01-30 PROCEDURE — 97110 THERAPEUTIC EXERCISES: CPT | Mod: GO | Performed by: OCCUPATIONAL THERAPIST

## 2023-01-30 PROCEDURE — 97035 APP MDLTY 1+ULTRASOUND EA 15: CPT | Mod: GO | Performed by: OCCUPATIONAL THERAPIST

## 2023-02-09 ENCOUNTER — MYC MEDICAL ADVICE (OUTPATIENT)
Dept: FAMILY MEDICINE | Facility: CLINIC | Age: 78
End: 2023-02-09
Payer: COMMERCIAL

## 2023-02-09 NOTE — PROGRESS NOTES
SOAP Note Objective Information for 2/13/2023    Diagnosis: Right Small Finger Trigger  DOI: 10/2022 (MD order 12/1/2022)  Post:  3 Months    Pain Level (Scale 0-10)   1/23/2023 1/30/23 2/13/23   At Rest 0 0 0   With Use 5 3-4 2-4     Pain Description  Date 1/23/2023   Location Right Small Finger   Pain Quality Triggering, sore, stiff, painful   Frequency Daytime, daily, intermittent   Pain is worst  daytime   Exacerbated by gripping   Relieved by rest   Progression unchanged     Edema (Circumference measured in cm)   1/23/2023 1/23/2023 1/30/23 2/13/23   Small Left Right Right Right   P1 5.9 6.3 6.1 6.1   PIP 5.7 6.1 5.7 5.7   P2 5.2 5.6 5.2 5.3     Home Exercise Program:  Trigger Finger Precautions  Anti -trigger splint  FM to SF A1 pulley  Extensor tracking on table  PROM finger flexion  Ice  2/13/23  FDS and FDP blocking - no triggering!    Next Visit:  PN due  Check response to ROM (blocking)   US  FM  PROM    Please refer to the daily flowsheet for treatment today, total treatment time and time spent performing 1:1 timed codes.

## 2023-02-10 NOTE — TELEPHONE ENCOUNTER
Please see mychart from patient and advise appropriate course of action.    On chart review pt has hx nummular eczema. Please advise if you like further assessment form triage RN    Carey Lala, RN  [facility] Triage Nurse

## 2023-02-10 NOTE — TELEPHONE ENCOUNTER
Tried to call patient and speak to him about his rash. The phone number [phone] states we have the wrong number. Will send back my chart message to call clinic.     Tammie Samano RN  [facility]

## 2023-02-13 ENCOUNTER — THERAPY VISIT (OUTPATIENT)
Dept: OCCUPATIONAL THERAPY | Facility: CLINIC | Age: 78
End: 2023-02-13
Payer: COMMERCIAL

## 2023-02-13 ENCOUNTER — NURSE TRIAGE (OUTPATIENT)
Dept: FAMILY MEDICINE | Facility: CLINIC | Age: 78
End: 2023-02-13

## 2023-02-13 DIAGNOSIS — M79.644 PAIN OF FINGER OF RIGHT HAND: Primary | ICD-10-CM

## 2023-02-13 DIAGNOSIS — M65.30 TRIGGER FINGER, ACQUIRED: ICD-10-CM

## 2023-02-13 DIAGNOSIS — L30.9 DERMATITIS: ICD-10-CM

## 2023-02-13 PROCEDURE — 97035 APP MDLTY 1+ULTRASOUND EA 15: CPT | Mod: GO | Performed by: OCCUPATIONAL THERAPIST

## 2023-02-13 PROCEDURE — 97140 MANUAL THERAPY 1/> REGIONS: CPT | Mod: GO | Performed by: OCCUPATIONAL THERAPIST

## 2023-02-13 PROCEDURE — 97110 THERAPEUTIC EXERCISES: CPT | Mod: GO | Performed by: OCCUPATIONAL THERAPIST

## 2023-02-13 RX ORDER — PREDNISONE 10 MG/1
TABLET ORAL
Qty: 20 TABLET | Refills: 0 | Status: SHIPPED | OUTPATIENT
Start: 2023-02-13

## 2023-02-13 NOTE — TELEPHONE ENCOUNTER
Daughter calling stating to leave a message to PCP    Do I need to make appointment or does patient need an allergy test because of rash    Disposition states appointment today. Please advise. Did inform daughter if rash worsens or shortness of breath to seek medical attention immediately. Daughter verbalized understanding.     Tata Ng RN on 2/13/2023 at 1:53 PM        Nurse Triage SBAR    Is this a 2nd Level Triage? YES, LICENSED PRACTITIONER REVIEW IS REQUIRED    Situation:Rash for about a week  Background:Unsure of cause  Assessment: quarter sized, red rash, unraised, no blistering, itchy  Protocol Recommended Disposition:   See in Office Today    Recommendation: Appointment needed.   Routed to provider    Does the patient meet one of the following criteria for ADS visit consideration? 16+ years old, with an [facility] PCP     TIP  Providers, please consider if this condition is appropriate for management at one of our Acute and Diagnostic Services sites.     If patient is a good candidate, please use dotphrase <dot>triageresponse and select Refer to ADS to document.    Reason for Disposition    SEVERE itching    Additional Information    Negative: Sudden onset of rash (within last 2 hours) and difficulty with breathing or swallowing    Negative: Difficult to awaken or acting confused (e.g., disoriented, slurred speech)    Negative: Fever and purple or blood-colored spots or dots    Negative: Too weak or sick to stand    Negative: Life-threatening reaction (anaphylaxis) in the past to similar substance (e.g., food, insect bite/sting, chemical, etc.) and < 2 hours since exposure    Negative: Sounds like a life-threatening emergency to the triager    Negative: Insect bites suspected    Negative: Sunburn suspected    Negative: Hives suspected    Negative: Drug rash suspected and started taking new medicine within last 2 weeks  (Exception: Antihistamine, eye drops, ear drops, decongestant or other OTC cough/cold  "medicines.)    Negative: Bright red, sunburn-like rash and current tampon use or nasal packing    Negative: Bright red, sunburn-like rash and wound infection or recent surgery    Negative: Bright red skin that peels off in sheets    Negative: Stiff neck (can't touch chin to chest)    Negative: Patient sounds very sick or weak to the triager    Negative: Fever    Negative: Face becomes swollen    Negative: Headache    Negative: Purple or blood-colored spots or dots (no fever and sounds well to triager)    Negative: Joint pain or swelling    Negative: Bloody crusts on lips or sores in mouth    Negative: Large or small blisters on skin (i.e., fluid filled bubbles or sacs)    Negative: Pregnant    Negative: Rash began within 4 hours of a new prescription medication    Answer Assessment - Initial Assessment Questions  1. SYMPTOM:  \"What's the main symptom you're concerned about?\" (e.g., pain, itching, swelling, rash)      Rash  2. ONSET: \"When did the rash  start?\"      One week ago  3. RECTAL PAIN: \"Do you have any pain around your rectum?\" \"How bad is the pain?\"  (Scale 0-10; or mild, moderate, severe)    - NONE (0): no pain    - MILD (1-3): doesn't interfere with normal activities     - MODERATE (4-7): interferes with normal activities or awakens from sleep, limping     - SEVERE (8-10): excruciating pain, unable to have a bowel movement       on  4. RECTAL ITCHING: \"Do you have any itching in this area?\" \"How bad is the itching?\"  (Scale 0-10; or mild, moderate, severe)    - NONE: no itching    - MILD: doesn't interfere with normal activities     - MODERATE-SEVERE: interferes with normal activities or awakens from sleep      Yes. Severe  5. CONSTIPATION: \"Do you have constipation?\" If Yes, ask: \"How bad is it?\"      No  6. CAUSE: \"What do you think is causing the anus symptoms?\"      No  7. OTHER SYMPTOMS: \"Do you have any other symptoms?\"  (e.g., abdomen pain, fever, rectal bleeding, vomiting)      No  8. PREGNANCY: " "\"Is there any chance you are pregnant?\" \"When was your last menstrual period?\"      N/A    Answer Assessment - Initial Assessment Questions  1. APPEARANCE of RASH: \"Describe the rash.\" (e.g., spots, blisters, raised areas, skin peeling, scaly)      red  2. SIZE: \"How big are the spots?\" (e.g., tip of pen, eraser, coin; inches, centimeters)      Quarter size  3. LOCATION: \"Where is the rash located?\"      All over body  4. COLOR: \"What color is the rash?\" (Note: It is difficult to assess rash color in people with darker-colored skin. When this situation occurs, simply ask the caller to describe what they see.)      red  5. ONSET: \"When did the rash begin?\"      One week ago  6. FEVER: \"Do you have a fever?\" If Yes, ask: \"What is your temperature, how was it measured, and when did it start?\"      No   7. ITCHING: \"Does the rash itch?\" If Yes, ask: \"How bad is the itch?\" (Scale 1-10; or mild, moderate, severe)      Severe  8. CAUSE: \"What do you think is causing the rash?\"      I don't know  9. MEDICINE FACTORS: \"Have you started any new medicines within the last 2 weeks?\" (e.g., antibiotics)       No  10. OTHER SYMPTOMS: \"Do you have any other symptoms?\" (e.g., dizziness, headache, sore throat, joint pain)        No  11. PREGNANCY: \"Is there any chance you are pregnant?\" \"When was your last menstrual period?\"        N/A    Protocols used: RASH OR REDNESS - WIDESPREAD-A-OH, RECTAL SYMPTOMS-A-OH    Tata Ng RN on 2/13/2023 at 1:52 PM    "

## 2023-02-13 NOTE — TELEPHONE ENCOUNTER
Patient was prescribed Elocon on 12/1/22.   Rash is worse. It is now all over the patient's body.     Daughter is asking if Dr. Turcios needs to see the patient or if referral to a specialist.     Today the rash is better than 4 days ago. No idea why the improvement.   Enma Forman RN

## 2023-02-13 NOTE — TELEPHONE ENCOUNTER
I will have him to try tapering dose prednisone as he did last year for the current dermatitis.   However, he might need to see dermatology eventually. I placed referral order as well. Please have him to start taking prednisone and also schedule with dermatology for f/u assessment       thx

## 2023-02-14 NOTE — TELEPHONE ENCOUNTER
Received a call back from the patient's Daughter, Emilee (C2C on file). Relayed message from the provider. Daughter expressed understanding and had no additional questions at this time.     Lizy Garcia RN

## 2023-03-03 NOTE — PROGRESS NOTES
Hand Therapy Progress Note  Reporting Period:  1/23/2023 through 3/6/2023    Diagnosis: Right Small Finger Trigger  DOI: 10/2022 (MD order 12/1/2022)  Post:  5 Months    Precautions: N/A    Initial History:  Roselia العراقي is a 78 year old male.    Patient reports symptoms of the right small finger which occurred due to unsure per patient report. Since onset symptoms are Unchanged  General health as reported by patient is fair.  Pertinent medical history includes:High Blood Pressure  Medical allergies:sulfa, teracyline.  Surgical history: none.  Medication history: High Blood Pressure.    Current occupation is retired    Occupational Profile Information:  Right hand dominant  Prior functional level:  no limitations  Patient reports symptoms of pain, stiffness/loss of motion and weakness/loss of strength  Special tests:  x-ray.    Previous treatment: none  Barriers include:none  Mobility: No difficulty  Leisure activities/hobbies: cooking  Other: Lives with wife and daughter    Subjective:  Subjective changes as noted by patient: The finger is no longer clicking and is no longer painful!   Functional changes noted by patient: Improvement in Self Care Tasks (dressing, eating, bathing, hygiene/toileting), Recreational Activities and Household Chores  Response to previous treatment: good  Patient has noted adverse reaction to: None    Objective  Pain Level (Scale 0-10)   1/23/2023 1/30/23 2/13/23 3/6/23   At Rest 0 0 0 0   With Use 5 3-4 2-4 0-1     Pain Description  Date 1/23/2023 3/6/23   Location Right Small Finger Right small finger   Pain Quality Triggering, sore, stiff, painful N/A   Frequency Daytime, daily, intermittent daytime   Pain is worst  daytime daytime   Exacerbated by gripping Flexing the finger   Relieved by rest Rest, splint, exercises   Progression unchanged resolving     Edema (Circumference measured in cm)   1/23/2023 1/23/2023 1/30/23 2/13/23 3/6/23   Small Left Right Right Right Right   P1 5.9 6.3  6.1 6.1 6.1   PIP 5.7 6.1 5.7 5.7 5.7   P2 5.2 5.6 5.2 5.3 5.3       ROM  Small Finger 1/23/2023 1/23/2023 3/6/23   AROM (PROM) Left Right Right   MCP 0/88 0/60 /75   PIP 0/92 0/67 /80   DIP -5/91 0/60 /67   FOSTER        Stage of Stenosing Tenosynovitis (SST)     1/23/2023 3/6/23   R Small Finger Stage 3 Stage 2-3   Stage 1:  Normal  Stage 2:  Uneven motion of tendon  Stage 3:  Triggering, clicking, catching  Stage 4:  Locking in extension or flexion; unlocked by active motion  Stage 5:  Locking in extension or flexion; unlocked by passive motion  Stage 6:  Finger locked in extension or flexion    Strength   (Measured in pounds)  Pain Report:  - none  + mild    ++ moderate    +++ severe    1/23/2023 1/23/2023 3/6/23   Trials Left Right Right   1  2  3 48 50+ 50+   Average 48 50 50     Palpation  Pain Report:  - none  + mild    ++ moderate    +++ severe   R Small Finger 1/23/2023 3/6/23   A1 Pulley ++ +   A3 Pulley/PIP - -   CMC NT    FA Flexors NT    FA Extensors NT      Assessment:  Response to therapy has been improvement to:  ROM of Fingers: MP joint - Flex, PIP joint - Flex, DIP joint - Flex  Flexibility:  tendon gliding is improved  Pain:  frequency is less, intensity of pain is decreased, duration of pain is decreased and less tender over affected area  Overall Assessment:  Patient is progressing well and is ready to decrease frequency of treatment in the clinic.  STG/LTG:  See goal sheet for details and updates of remaining functional limitations.     Plan:  I have re-evaluated this patient and find that the nature, scope, duration and intensity of the therapy is appropriate for the medical condition of the patient.  Continue current plan of care  Frequency:  1 X week, once daily  Duration:  for 8 weeks    Home Exercise Program:  Trigger Finger Precautions  Anti -trigger splint  FM to SF A1 pulley  Extensor tracking on table  PROM finger flexion  Ice  2/13/23  FDS and FDP blocking - no triggering!    Next  Visit:  US  FM  PROM    Please refer to the daily flowsheet for treatment today, total treatment time and time spent performing 1:1 timed codes.

## 2023-03-06 ENCOUNTER — THERAPY VISIT (OUTPATIENT)
Dept: OCCUPATIONAL THERAPY | Facility: CLINIC | Age: 78
End: 2023-03-06
Payer: COMMERCIAL

## 2023-03-06 DIAGNOSIS — M79.644 PAIN OF FINGER OF RIGHT HAND: Primary | ICD-10-CM

## 2023-03-06 DIAGNOSIS — M65.30 TRIGGER FINGER, ACQUIRED: ICD-10-CM

## 2023-03-06 PROCEDURE — 97110 THERAPEUTIC EXERCISES: CPT | Mod: GO | Performed by: OCCUPATIONAL THERAPIST

## 2023-03-06 PROCEDURE — 97035 APP MDLTY 1+ULTRASOUND EA 15: CPT | Mod: GO | Performed by: OCCUPATIONAL THERAPIST

## 2023-03-06 PROCEDURE — 97140 MANUAL THERAPY 1/> REGIONS: CPT | Mod: GO | Performed by: OCCUPATIONAL THERAPIST

## 2023-03-14 ENCOUNTER — OFFICE VISIT (OUTPATIENT)
Dept: FAMILY MEDICINE | Facility: CLINIC | Age: 78
End: 2023-03-14
Payer: COMMERCIAL

## 2023-03-14 VITALS
HEIGHT: 65 IN | HEART RATE: 79 BPM | BODY MASS INDEX: 28.82 KG/M2 | SYSTOLIC BLOOD PRESSURE: 126 MMHG | WEIGHT: 173 LBS | DIASTOLIC BLOOD PRESSURE: 78 MMHG | OXYGEN SATURATION: 95 % | TEMPERATURE: 98 F

## 2023-03-14 DIAGNOSIS — I83.93 ASYMPTOMATIC VARICOSE VEINS OF BOTH LOWER EXTREMITIES: ICD-10-CM

## 2023-03-14 DIAGNOSIS — I10 ESSENTIAL HYPERTENSION: ICD-10-CM

## 2023-03-14 DIAGNOSIS — E78.2 MIXED HYPERLIPIDEMIA: ICD-10-CM

## 2023-03-14 DIAGNOSIS — E78.5 HYPERLIPIDEMIA LDL GOAL <130: ICD-10-CM

## 2023-03-14 DIAGNOSIS — L50.9 URTICARIA: Primary | ICD-10-CM

## 2023-03-14 PROCEDURE — 99214 OFFICE O/P EST MOD 30 MIN: CPT | Performed by: FAMILY MEDICINE

## 2023-03-14 RX ORDER — SIMVASTATIN 40 MG
40 TABLET ORAL AT BEDTIME
Qty: 90 TABLET | Refills: 3 | Status: SHIPPED | OUTPATIENT
Start: 2023-03-14 | End: 2023-09-19

## 2023-03-14 ASSESSMENT — PAIN SCALES - GENERAL: PAINLEVEL: NO PAIN (0)

## 2023-03-14 NOTE — PROGRESS NOTES
"  Assessment & Plan     Urticaria  Improving after steroid tapering, will have him to contact us when flares again    Essential hypertension  Has been stable, will continue monitoring with current regimen     Hyperlipidemia LDL goal <130  Stable, keep monitoring and recheck in 6 months     Asymptomatic varicose veins of both lower extremities  Stable  Keep monitoring     Mixed hyperlipidemia  Mentioned above   - simvastatin (ZOCOR) 40 MG tablet; Take 1 tablet (40 mg) by mouth At Bedtime           FUTURE APPOINTMENTS:       - Follow-up visit in 6 months     No follow-ups on file.    David Turcios MD  [facility] SUNI Veloz is a 78 year old presenting for the following health issues:  Hypertension (Follow up) and Recheck Medication (Review meds)      History of Present Illness       Hypertension: He presents for follow up of hypertension.  He does not check blood pressure  regularly outside of the clinic. Outpatient blood pressures have not been over 140/90. He follows a low salt diet.     He eats 0-1 servings of fruits and vegetables daily.He consumes 0 sweetened beverage(s) daily.He exercises with enough effort to increase his heart rate 20 to 29 minutes per day.  He exercises with enough effort to increase his heart rate 5 days per week. He is missing 1 dose(s) of medications per week.  He is not taking prescribed medications regularly due to remembering to take.       Review of Systems   Constitutional, HEENT, cardiovascular, pulmonary, gi and gu systems are negative, except as otherwise noted.      Objective    /78   Pulse 79   Temp 98  F (36.7  C) (Temporal)   Ht 1.651 m (5' 5\")   Wt 78.5 kg (173 lb)   SpO2 95%   BMI 28.79 kg/m    Body mass index is 28.79 kg/m .  Physical Exam   GENERAL: healthy, alert and no distress  EYES: Eyes grossly normal to inspection, PERRL and conjunctivae and sclerae normal  HENT: ear canals and TM's normal, nose and mouth without ulcers " or lesions  NECK: no adenopathy, no asymmetry, masses, or scars and thyroid normal to palpation  RESP: lungs clear to auscultation - no rales, rhonchi or wheezes  CV: regular rate and rhythm, normal S1 S2, no S3 or S4, no murmur, click or rub, no peripheral edema and peripheral pulses strong  ABDOMEN: soft, nontender, no hepatosplenomegaly, no masses and bowel sounds normal  MS: no gross musculoskeletal defects noted, no edema  SKIN: no suspicious lesions or rashes  NEURO: Normal strength and tone, mentation intact and speech normal  BACK: no CVA tenderness, no paralumbar tenderness  PSYCH: mentation appears normal, affect normal/bright  LYMPH: no cervical, supraclavicular, axillary, or inguinal adenopathy

## 2023-03-23 NOTE — PROGRESS NOTES
Hand Therapy Progress Note  Reporting Period: 3/6/2023 through 3/27/2023    Diagnosis: Right Small Finger Trigger  DOI: 10/2022 (MD order 12/1/2022)  Post:  5 Months    Precautions: N/A    Initial History:  Roselia العراقي is a 78 year old male.    Patient reports symptoms of the right small finger which occurred due to unsure per patient report. Since onset symptoms are Unchanged  General health as reported by patient is fair.  Pertinent medical history includes:High Blood Pressure  Medical allergies:sulfa, teracyline.  Surgical history: none.  Medication history: High Blood Pressure.    Current occupation is retired    Occupational Profile Information:  Right hand dominant  Prior functional level:  no limitations  Patient reports symptoms of pain, stiffness/loss of motion and weakness/loss of strength  Special tests:  x-ray.    Previous treatment: none  Barriers include:none  Mobility: No difficulty  Leisure activities/hobbies: cooking  Other: Lives with wife and daughter    S:  Subjective changes as noted by patient: The finger has been feeling good!    Functional changes noted by patient: Improvement in Self Care Tasks (dressing, eating, bathing, hygiene/toileting)  Response to previous treatment: good  Patient has noted adverse reaction to: None    Pain Level (Scale 0-10)   1/23/2023 1/30/23 2/13/23 3/6/23 3/27/23   At Rest 0 0 0 0 0   With Use 5 3-4 2-4 0-1 0-1     Pain Description  Date 1/23/2023 3/6/23   Location Right Small Finger Right small finger   Pain Quality Triggering, sore, stiff, painful N/A   Frequency Daytime, daily, intermittent daytime   Pain is worst  daytime daytime   Exacerbated by gripping Flexing the finger   Relieved by rest Rest, splint, exercises   Progression unchanged resolving       Edema (Circumference measured in cm)   1/23/2023 1/23/2023 1/30/23 2/13/23 3/6/23   Small Left Right Right Right Right   P1 5.9 6.3 6.1 6.1 6.1   PIP 5.7 6.1 5.7 5.7 5.7   P2 5.2 5.6 5.2 5.3 5.3        ROM  Small Finger 1/23/2023 1/23/2023 3/6/23   AROM (PROM) Left Right Right   MCP 0/88 0/60 /75   PIP 0/92 0/67 /80   DIP -5/91 0/60 /67   FOSTER        Stage of Stenosing Tenosynovitis (SST)     1/23/2023 3/6/23 3/27/23   R Small Finger Stage 3 Stage 2-3 Stage 2-3   Stage 1:  Normal  Stage 2:  Uneven motion of tendon  Stage 3:  Triggering, clicking, catching  Stage 4:  Locking in extension or flexion; unlocked by active motion  Stage 5:  Locking in extension or flexion; unlocked by passive motion  Stage 6:  Finger locked in extension or flexion    Strength   (Measured in pounds)  Pain Report:  - none  + mild    ++ moderate    +++ severe    1/23/2023 1/23/2023 3/6/23   Trials Left Right Right   1  2  3 48 50+ 50+   Average 48 50 50     Palpation  Pain Report:  - none  + mild    ++ moderate    +++ severe   R Small Finger 1/23/2023 3/6/23 3/27/23   A1 Pulley ++ + +   A3 Pulley/PIP - - NT   CMC NT  NT   FA Flexors NT  NT   FA Extensors NT  NT     Assessment:  Response to therapy has been improvement to:    Pain: Reducing pain with daily use  Flexibility:  tendon gliding is improved  Overall Assessment:  Patient is progressing well and is ready to decrease frequency of treatment in the clinic.  STG/LTG:  See goal sheet for details and updates of remaining functional limitations.     Plan:  I have re-evaluated this patient and find that the nature, scope, duration and intensity of the therapy is appropriate for the medical condition of the patient.  Continue current plan of care  Frequency:  1 X month, once daily  Duration:  for 3 months    Home Exercise Program:  Trigger Finger Precautions  Anti -trigger splint  FM to SF A1 pulley  Extensor tracking on table  PROM finger flexion  Ice  2/13/23  FDS and FDP blocking - no triggering!    Next Visit:  US  FM  PROM    Please refer to the daily flowsheet for treatment today, total treatment time and time spent performing 1:1 timed codes.

## 2023-03-27 ENCOUNTER — THERAPY VISIT (OUTPATIENT)
Dept: OCCUPATIONAL THERAPY | Facility: CLINIC | Age: 78
End: 2023-03-27
Payer: COMMERCIAL

## 2023-03-27 ENCOUNTER — TELEPHONE (OUTPATIENT)
Dept: FAMILY MEDICINE | Facility: CLINIC | Age: 78
End: 2023-03-27

## 2023-03-27 DIAGNOSIS — M65.30 TRIGGER FINGER, ACQUIRED: ICD-10-CM

## 2023-03-27 DIAGNOSIS — M79.644 PAIN OF FINGER OF RIGHT HAND: Primary | ICD-10-CM

## 2023-03-27 PROCEDURE — 97140 MANUAL THERAPY 1/> REGIONS: CPT | Mod: GO | Performed by: OCCUPATIONAL THERAPIST

## 2023-03-27 PROCEDURE — 97110 THERAPEUTIC EXERCISES: CPT | Mod: GO | Performed by: OCCUPATIONAL THERAPIST

## 2023-03-27 NOTE — TELEPHONE ENCOUNTER
Forms/Letter Request    Type of form/letter: [employer]    Have you been seen for this request: No    Do we have the form/letter: Yes    When is form/letter needed by: when able    How would you like the form/letter returned: Fax [phone]    Placed in red folder, and put on Dr Turcios's desk.    Raven PEREYRA    [facility]

## 2023-03-27 NOTE — PROGRESS NOTES
[facility]    OUTPATIENT Occupational Therapy ORTHOPEDIC EVALUATION  PLAN OF TREATMENT FOR OUTPATIENT REHABILITATION  (COMPLETE FOR INITIAL CLAIMS ONLY)  Patient's Last Name, First Name, M.I.  YOB: 1945  Roselia العراقي       Provider s Name:  KIMBER[facility]   Medical Record No.  [id number]   Start of Care Date:  01/23/23   Onset Date:   12/01/22 (MD order)   Treatment Diagnosis:  R Small Finger Trigger Medical Diagnosis:     Pain of finger of right hand  Trigger finger, acquired       Goals:     03/27/23 0500   Goal #1   Goal #1 household chores   Previous Performance Level Independent   Current Functional Task    Current Performance Level 3/10 pain   STG [employer] Shopping bag   STG Target Perform Level 2/10 pain   Due Date 04/04/23   If goal not met, Why? reducing pain   LTG Target Task/Performance Independent   Due Date 05/20/23         Therapy Frequency:  1 x Month  Predicted Duration of Therapy Intervention:  3 Months    Nae Dias OT                 I CERTIFY THE NEED FOR THESE SERVICES FURNISHED UNDER        THIS PLAN OF TREATMENT AND WHILE UNDER MY CARE     (Physician attestation of this document indicates review and certification of the therapy plan).                     Certification Date From:  03/24/23   Certification Date To:  06/20/23    Referring Provider:  David Turcios    Initial Assessment        See Epic Evaluation SOC Date: 01/23/23

## 2023-06-27 ENCOUNTER — PATIENT OUTREACH (OUTPATIENT)
Dept: GERIATRIC MEDICINE | Facility: CLINIC | Age: 78
End: 2023-06-27
Payer: COMMERCIAL

## 2023-06-27 NOTE — PROGRESS NOTES
[facility] Care Coordination Contact    Called adult daughter Emilee to schedule annual HRA home visit. HRA has been scheduled for Tuesday, 7/11.  Called Jenni Villarreal and scheduled an  for the home visit.   Daughter is requesting the assessment be completed at her home.    Fiorella Whitehead RN  [facility]  [phone]

## 2023-07-11 ENCOUNTER — PATIENT OUTREACH (OUTPATIENT)
Dept: GERIATRIC MEDICINE | Facility: CLINIC | Age: 78
End: 2023-07-11
Payer: COMMERCIAL

## 2023-07-11 DIAGNOSIS — H35.30 MACULAR DEGENERATION (SENILE) OF RETINA: Primary | ICD-10-CM

## 2023-07-11 ASSESSMENT — ACTIVITIES OF DAILY LIVING (ADL): DEPENDENT_IADLS:: CLEANING;COOKING;LAUNDRY;SHOPPING;MEAL PREPARATION;MEDICATION MANAGEMENT;TRANSPORTATION

## 2023-07-12 ENCOUNTER — OFFICE VISIT (OUTPATIENT)
Dept: FAMILY MEDICINE | Facility: CLINIC | Age: 78
End: 2023-07-12
Attending: FAMILY MEDICINE
Payer: COMMERCIAL

## 2023-07-12 DIAGNOSIS — I87.2 STASIS DERMATITIS OF BOTH LEGS: Primary | ICD-10-CM

## 2023-07-12 PROCEDURE — 99204 OFFICE O/P NEW MOD 45 MIN: CPT | Performed by: NURSE PRACTITIONER

## 2023-07-12 RX ORDER — TRIAMCINOLONE ACETONIDE 1 MG/G
OINTMENT TOPICAL 2 TIMES DAILY
Qty: 80 G | Refills: 1 | Status: SHIPPED | OUTPATIENT
Start: 2023-07-12

## 2023-07-12 NOTE — LETTER
7/12/2023         RE: Roselia العراقي  [address]  [place] [address]        Dear Colleague,    Thank you for referring your patient, Roselia العراقي, to the [facility]. Please see a copy of my visit note below.    [facility] Dermatology Note  Encounter Date: Jul 12, 2023  Office Visit     Reviewed patients past medical history and pertinent chart review prior to patients visit today.     Dermatology Problem List:  Stasis dermatitis, given triamcinolone 0.1% ointment to use twice daily as needed for pruritus or active rash 7/12/2023     Patient was accompanied by his daughter today    ____________________________________________    Assessment & Plan:     # Stasis dermatitis.  -When bathing, do not use soap on the lower legs unless they are physically dirty.  Pat skin dry instead of vigorous rubbing. Encouraged regular use of an emollient such as Vaseline, cera Ve Cream or Cetaphil Cream at least twice daily to the lower legs.  -compression is key in improving skin integrity. Discussed options of compression socks or ace wrapping. If fluid is leaking from legs apply a dressing under the wraps.   -prior to compression application, and upon removal at night apply triamcinolone 0.1% ointment twice to all areas of rash/skin breakdown or itching only when rash is present  -ambulation helps circulation, stand and walk at least once per hour, more is better.  -discoloration is likely permanent but erythema and symptom resolution is the goal of treatment.     Follow up in 1 month.      Cookie Ding, CNP  Dermatology    _______________________________________    CC: Derm Problem (Dark spots on legs that began 10 years ago as only a small area and has grown.  in [place] stated it was due to Venous Thrombosis /Using Mometasone Furate Cream Usp 0/1% every day and PRN/Skin lotion doesn't seem to work for dry skin- seems to be hard to find right consistency)    HPI:  Mr. Veloz  Dulce Maria is a(n) 78 year old male who presents today as a new patient for discoloration of the lower legs.  Patient comes from [place] and says this started about 10 years ago.  He was told in [place] that if it got worse they may have to amputate his legs.  He says he never has sores or skin breakdown but sometimes it is itchy or dry.  His daughter recently got her him a diabetic lotion to use.  He denies any pain in the legs but wonders why they continue to be discolored.    Patient is otherwise feeling well, without additional skin concerns.      Physical Exam:  SKIN: Focused examination of lower legs was performed.  -Many areas of ill-defined hyperpigmentation and varicose veins on the lower legs.  No erythema scale or skin breakdown    - No other lesions of concern on areas examined.     Medications:  Current Outpatient Medications   Medication     aspirin (ASA) 81 MG chewable tablet     calcium carbonate 500 MG tablet     cetirizine (ZYRTEC) 10 MG tablet     fish oil-omega-3 fatty acids 1000 MG capsule     losartan (COZAAR) 100 MG tablet     mometasone (ELOCON) 0.1 % external cream     Multiple Vitamins-Minerals (ONE-A-DAY MENS 50+ ADVANTAGE PO)     Multiple Vitamins-Minerals (PRESERVISION AREDS 2) CAPS     polyethylene glycol (MIRALAX) 17 GM/Dose powder     predniSONE (DELTASONE) 10 MG tablet     simvastatin (ZOCOR) 40 MG tablet     UNABLE TO FIND     No current facility-administered medications for this visit.      Past Medical History:   Patient Active Problem List   Diagnosis     Smoking     Dyspepsia     Asymptomatic varicose veins of both lower extremities     Hyperlipidemia LDL goal <130     H. pylori infection     Vertigo, benign positional     Essential hypertension     Hyperlipidemia with target LDL less than 130     Advanced directives, counseling/discussion     Left knee pain     Benign neoplasm of colon     Pain of finger of right hand     Trigger finger, acquired     Past Medical History:   Diagnosis  Date     Gastric ulcer, unspecified as acute or chronic, without mention of hemorrhage or perforation      Gastro-oesophageal reflux disease      High cholesterol      Hyperlipaemia      Hypertension      Smoking      Upper GI bleeding      Vertigo, benign positional        CC David Turcios MD  [address] [place] [phone] on close of this encounter.       Again, thank you for allowing me to participate in the care of your patient.        Sincerely,        MAXIM Salazar CNP

## 2023-07-12 NOTE — PROGRESS NOTES
[facility] Dermatology Note  Encounter Date: Jul 12, 2023  Office Visit     Reviewed patients past medical history and pertinent chart review prior to patients visit today.     Dermatology Problem List:  Stasis dermatitis, given triamcinolone 0.1% ointment to use twice daily as needed for pruritus or active rash 7/12/2023     Patient was accompanied by his daughter today    ____________________________________________    Assessment & Plan:     # Stasis dermatitis.  -When bathing, do not use soap on the lower legs unless they are physically dirty.  Pat skin dry instead of vigorous rubbing. Encouraged regular use of an emollient such as Vaseline, cera Ve Cream or Cetaphil Cream at least twice daily to the lower legs.  -compression is key in improving skin integrity. Discussed options of compression socks or ace wrapping. If fluid is leaking from legs apply a dressing under the wraps.   -prior to compression application, and upon removal at night apply triamcinolone 0.1% ointment twice to all areas of rash/skin breakdown or itching only when rash is present  -ambulation helps circulation, stand and walk at least once per hour, more is better.  -discoloration is likely permanent but erythema and symptom resolution is the goal of treatment.     Follow up in 1 month.      Cookie Ding, [facility]  Dermatology    _______________________________________    CC: Derm Problem (Dark spots on legs that began 10 years ago as only a small area and has grown. Dr in [place] stated it was due to Venous Thrombosis /Using Mometasone Furate Cream Usp 0/1% every day and PRN/Skin lotion doesn't seem to work for dry skin- seems to be hard to find right consistency)    HPI:  Mr. Roselia العراقي is a(n) 78 year old male who presents today as a new patient for discoloration of the lower legs.  Patient comes from [place] and says this started about 10 years ago.  He was told in [place] that if it got worse they may have to amputate his legs.   He says he never has sores or skin breakdown but sometimes it is itchy or dry.  His daughter recently got her him a diabetic lotion to use.  He denies any pain in the legs but wonders why they continue to be discolored.    Patient is otherwise feeling well, without additional skin concerns.      Physical Exam:  SKIN: Focused examination of lower legs was performed.  -Many areas of ill-defined hyperpigmentation and varicose veins on the lower legs.  No erythema scale or skin breakdown    - No other lesions of concern on areas examined.     Medications:  Current Outpatient Medications   Medication     aspirin (ASA) 81 MG chewable tablet     calcium carbonate 500 MG tablet     cetirizine (ZYRTEC) 10 MG tablet     fish oil-omega-3 fatty acids 1000 MG capsule     losartan (COZAAR) 100 MG tablet     mometasone (ELOCON) 0.1 % external cream     Multiple Vitamins-Minerals (ONE-A-DAY MENS 50+ ADVANTAGE PO)     Multiple Vitamins-Minerals (PRESERVISION AREDS 2) CAPS     polyethylene glycol (MIRALAX) 17 GM/Dose powder     predniSONE (DELTASONE) 10 MG tablet     simvastatin (ZOCOR) 40 MG tablet     UNABLE TO FIND     No current facility-administered medications for this visit.      Past Medical History:   Patient Active Problem List   Diagnosis     Smoking     Dyspepsia     Asymptomatic varicose veins of both lower extremities     Hyperlipidemia LDL goal <130     H. pylori infection     Vertigo, benign positional     Essential hypertension     Hyperlipidemia with target LDL less than 130     Advanced directives, counseling/discussion     Left knee pain     Benign neoplasm of colon     Pain of finger of right hand     Trigger finger, acquired     Past Medical History:   Diagnosis Date     Gastric ulcer, unspecified as acute or chronic, without mention of hemorrhage or perforation      Gastro-oesophageal reflux disease      High cholesterol      Hyperlipaemia      Hypertension      Smoking      Upper GI bleeding      Vertigo,  benign positional        CC David Turcios MD  [address] [place] [phone] on close of this encounter.

## 2023-07-12 NOTE — PATIENT INSTRUCTIONS
Patient Education       Proper skin care from [facility]:    -Eliminate harsh soaps as they strip the natural oils from the skin, often resulting in dry itchy skin ( i.e. Dial, Zest, Khmer Spring)  -Use mild soaps such as Cetaphil or Dove Sensitive Skin in the shower. You do not need to use soap on arms, legs, and trunk every time you shower unless visibly soiled.   -Avoid hot or cold showers.  -After showering, lightly dry off and apply moisturizing within 2-3 minutes. This will help trap moisture in the skin.   -Aggressive use of a moisturizer at least 1-2 times a day to the entire body (including -Vanicream, Cetaphil, Aquaphor or Cerave) and moisturize hands after every washing.  -We recommend using moisturizers that come in a tub that needs to be scooped out, not a pump. This has more of an oil base. It will hold moisture in your skin much better than a water base moisturizer. The above recommended are non-pore clogging.      Wear a sunscreen with at least SPF 30 on your face, ears, neck and V of the chest daily. Wear sunscreen on other areas of the body if those areas are exposed to the sun throughout the day. Sunscreens can contain physical and/or chemical blockers. Physical blockers are less likely to clog pores, these include zinc oxide and titanium dioxide. Reapply every two hour and after swimming.     Sunscreen examples: https://www.ewg.org/sunscreen/    UV radiation  UVA radiation remains constant throughout the day and throughout the year. It is a longer wavelength than UVB and therefore penetrates deeper into the skin leading to immediate and delayed tanning, photoaging, and skin cancer. 70-80% of UVA and UVB radiation occurs between the hours of 10am-2pm.  UVB radiation  UVB radiation causes the most harmful effects and is more significant during the summer months. However, snow and ice can reflect UVB radiation leading to skin damage during the winter months as well. UVB radiation is  responsible for tanning, burning, inflammation, delayed erythema (pinkness), pigmentation (brown spots), and skin cancer.     I recommend self monthly full body exams and yearly full body exams with a dermatology provider. If you develop a new or changing lesion please follow up for examination. Most skin cancers are pink and scaly or pink and pearly. However, we do see blue/brown/black skin cancers.  Consider the ABCDEs of melanoma when giving yourself your monthly full body exam ( don't forget the groin, buttocks, feet, toes, etc). A-asymmetry, B-borders, C-color, D-diameter, E-elevation or evolving. If you see any of these changes please follow up in clinic. If you cannot see your back I recommend purchasing a hand held mirror to use with a larger wall mirror.       Checking for Skin Cancer  You can find cancer early by checking your skin each month. There are 3 kinds of skin cancer. They are melanoma, basal cell carcinoma, and squamous cell carcinoma. Doing monthly skin checks is the best way to find new marks or skin changes. Follow the instructions below for checking your skin.   The ABCDEs of checking moles for melanoma   Check your moles or growths for signs of melanoma using ABCDE:   Asymmetry: the sides of the mole or growth don t match  Border: the edges are ragged, notched, or blurred  Color: the color within the mole or growth varies  Diameter: the mole or growth is larger than 6 mm (size of a pencil eraser)  Evolving: the size, shape, or color of the mole or growth is changing (evolving is not shown in the images below)    Checking for other types of skin cancer  Basal cell carcinoma or squamous cell carcinoma have symptoms such as:     A spot or mole that looks different from all other marks on your skin  Changes in how an area feels, such as itching, tenderness, or pain  Changes in the skin's surface, such as oozing, bleeding, or scaliness  A sore that does not heal  New swelling or redness beyond  the border of a mole    Who s at risk?  Anyone can get skin cancer. But you are at greater risk if you have:   Fair skin, light-colored hair, or light-colored eyes  Many moles or abnormal moles on your skin  A history of sunburns from sunlight or tanning beds  A family history of skin cancer  A history of exposure to radiation or chemicals  A weakened immune system  If you have had skin cancer in the past, you are at risk for recurring skin cancer.   How to check your skin  Do your monthly skin checkups in front of a full-length mirror. Check all parts of your body, including your:   Head (ears, face, neck, and scalp)  Torso (front, back, and sides)  Arms (tops, undersides, upper, and lower armpits)  Hands (palms, backs, and fingers, including under the nails)  Buttocks and genitals  Legs (front, back, and sides)  Feet (tops, soles, toes, including under the nails, and between toes)  If you have a lot of moles, take digital photos of them each month. Make sure to take photos both up close and from a distance. These can help you see if any moles change over time.   Most skin changes are not cancer. But if you see any changes in your skin, call your doctor right away. Only he or she can diagnose a problem. If you have skin cancer, seeing your doctor can be the first step toward getting the treatment that could save your life.   [employer] last reviewed this educational content on 4/1/2019 2000-2020 [employer]. [address] [place] [address], [place] [address]. All rights reserved. This information is not intended as a substitute for professional medical care. Always follow your healthcare professional's instructions.       When should I call my doctor?  If you are worsening or not improving, please, contact us or seek urgent care as noted below.     Who should I call with questions (adults)?  [facility] (adult and pediatric): [phone]  [facility]  [place] (adult): [phone]  [facility] ([place], [place], [place] and [place]) [phone]  For urgent needs outside of business hours call the [facility] at [phone] and ask for the dermatology resident on call to be paged  If this is a medical emergency and you are unable to reach an ER, Call 911      If you need a prescription refill, please contact your pharmacy. Refills are approved or denied by our Physicians during normal business hours, Monday through Fridays  Per office policy, refills will not be granted if you have not been seen within the past year (or sooner depending on your child's condition)

## 2023-07-14 ENCOUNTER — PATIENT OUTREACH (OUTPATIENT)
Dept: GERIATRIC MEDICINE | Facility: CLINIC | Age: 78
End: 2023-07-14
Payer: COMMERCIAL

## 2023-07-14 NOTE — PROGRESS NOTES
[facility] Care Coordination Contact     contacted [facility] to see the status of the member's MA renewal status since it is due 8/1/23.  The first call made to [facility] stated the member did not have any paperwork sent in and could either mail the packet or have the daughter renew online.   informed daughter, Emilee, who stated she was having difficulty renewing him online and asked for the county's number.  Emilee called back stating nothing was needed to be done for the renewal.    called the [facility] back to ensure this was the case, as there was a discrepancy between what was told to  and what was told to Emilee. [facility] states the member is under automatic renewal and will not be due for any additional paperwork until 8/1/2024.  Daughter was informed.    Fiorella Whitehead RN  [facility]  [phone]

## 2023-07-19 ENCOUNTER — TELEPHONE (OUTPATIENT)
Dept: FAMILY MEDICINE | Facility: CLINIC | Age: 78
End: 2023-07-19
Payer: COMMERCIAL

## 2023-07-19 ENCOUNTER — PATIENT OUTREACH (OUTPATIENT)
Dept: GERIATRIC MEDICINE | Facility: CLINIC | Age: 78
End: 2023-07-19
Payer: COMMERCIAL

## 2023-07-19 RX ORDER — ANTIOX #8/OM3/DHA/EPA/LUT/ZEAX 250-2.5 MG
1 CAPSULE ORAL DAILY
Qty: 90 CAPSULE | Refills: 3 | Status: SHIPPED | OUTPATIENT
Start: 2023-07-19

## 2023-07-19 SDOH — ECONOMIC STABILITY: INCOME INSECURITY: IN THE LAST 12 MONTHS, WAS THERE A TIME WHEN YOU WERE NOT ABLE TO PAY THE MORTGAGE OR RENT ON TIME?: NO

## 2023-07-19 SDOH — ECONOMIC STABILITY: FOOD INSECURITY: WITHIN THE PAST 12 MONTHS, YOU WORRIED THAT YOUR FOOD WOULD RUN OUT BEFORE YOU GOT MONEY TO BUY MORE.: NEVER TRUE

## 2023-07-19 SDOH — HEALTH STABILITY: PHYSICAL HEALTH: ON AVERAGE, HOW MANY DAYS PER WEEK DO YOU ENGAGE IN MODERATE TO STRENUOUS EXERCISE (LIKE A BRISK WALK)?: 7 DAYS

## 2023-07-19 SDOH — ECONOMIC STABILITY: FOOD INSECURITY: WITHIN THE PAST 12 MONTHS, THE FOOD YOU BOUGHT JUST DIDN'T LAST AND YOU DIDN'T HAVE MONEY TO GET MORE.: NEVER TRUE

## 2023-07-19 SDOH — HEALTH STABILITY: PHYSICAL HEALTH: ON AVERAGE, HOW MANY MINUTES DO YOU ENGAGE IN EXERCISE AT THIS LEVEL?: 30 MIN

## 2023-07-19 ASSESSMENT — SOCIAL DETERMINANTS OF HEALTH (SDOH)
DO YOU BELONG TO ANY CLUBS OR ORGANIZATIONS SUCH AS CHURCH GROUPS UNIONS, FRATERNAL OR ATHLETIC GROUPS, OR SCHOOL GROUPS?: NO
HOW OFTEN DO YOU ATTENT MEETINGS OF THE CLUB OR ORGANIZATION YOU BELONG TO?: NEVER
IN A TYPICAL WEEK, HOW MANY TIMES DO YOU TALK ON THE PHONE WITH FAMILY, FRIENDS, OR NEIGHBORS?: MORE THAN THREE TIMES A WEEK
HOW OFTEN DO YOU GET TOGETHER WITH FRIENDS OR RELATIVES?: MORE THAN THREE TIMES A WEEK
HOW OFTEN DO YOU ATTEND CHURCH OR RELIGIOUS SERVICES?: NEVER
HOW HARD IS IT FOR YOU TO PAY FOR THE VERY BASICS LIKE FOOD, HOUSING, MEDICAL CARE, AND HEATING?: NOT HARD AT ALL

## 2023-07-19 ASSESSMENT — LIFESTYLE VARIABLES
HOW OFTEN DO YOU HAVE A DRINK CONTAINING ALCOHOL: NEVER
AUDIT-C TOTAL SCORE: 0
SKIP TO QUESTIONS 9-10: 1
HOW OFTEN DO YOU HAVE SIX OR MORE DRINKS ON ONE OCCASION: NEVER
HOW MANY STANDARD DRINKS CONTAINING ALCOHOL DO YOU HAVE ON A TYPICAL DAY: PATIENT DOES NOT DRINK

## 2023-07-19 ASSESSMENT — PATIENT HEALTH QUESTIONNAIRE - PHQ9: SUM OF ALL RESPONSES TO PHQ QUESTIONS 1-9: 0

## 2023-07-19 NOTE — PROGRESS NOTES
"[facility] Care Coordination Contact    CC was able to communicate with [facility] in regards to getting the member a lighter and smaller magnifying glass to read.   Dr. Parham feels the member's right eye vision should be good enough to just need glasses. If the vision is worsening, the member could be referred to a \"low vision specialist\".  In the meantime, the member can go on [employer] and there should be many options to choose from.    Fiorella Whitehead RN  [facility]  [phone]        "

## 2023-07-19 NOTE — PROGRESS NOTES
[facility] Care Coordination Contact    [facility] Home Visit Assessment     Home visit for Health Risk Assessment with Roselia العراقي completed on July 19, 2023    Type of residence:: Private home - stairs  Current living arrangement:: I live in a private home with family     Assessment completed with:: Patient, Care Team Member, Children (Emilee-Daughter), Spouse or significant other    Current Care Plan  Member currently receiving the following home care services:  None   Member currently receiving the following community resources: Day Care, personal care attendant, and homemaking      Medication Review  Medication reconciliation completed in Epic: Yes  Medication set-up & administration: Family/informal caregiver sets up weekly.  Daughter sets up medications and provides reminders.  Medication Risk Assessment Medication (1 or more, place referral to MTM): N/A: No risk factors identified  MTM Referral Placed: No: No risk factors idenified    Mental/Behavioral Health   Depression Screening:   PHQ-2 Total Score (Adult) - Positive if 3 or more points; Administer PHQ-9 if positive: 0  PHQ-9 Total Score: 0    Mental health DX:: No   Mental health DX how managed:: None    Falls Assessment:   Fallen 2 or more times in the past year?: No   Any fall with injury in the past year?: No    ADL/IADL Dependencies:   Dependent ADLs:: Ambulation-cane, Dressing, Grooming, Transfers  Dependent IADLs:: Cleaning, Cooking, Laundry, Shopping, Meal Preparation, Medication Management, Transportation    [facility] Plan sponsored benefits: Shared information re: Silver Sneakers/gym memberships, ASA, Calcium +D.    PCA Assessment completed at visit: Yes Annual PCA assessment indicated 3.5 hours per day of PCA. This is an increase from the previous assessment.     Elderly Waiver Eligibility: Yes-will continue on EW    Care Plan & Recommendations:   Discussed MA renewal with member and family. Contacted [facility] states  the member is automatic renewal through 8/2024.  Member to continue with OT for trigger finger of the right hand.  Member to continue seeing ophthalmologist regarding worsening macular/retinal degeneration. Member continuing to take recommended AREDS 2, which he pays out of pocket for. Uses magnifier to help him read, but would like a smaller device.  Member would like a referral to audiology. Face to face visit with PCP may be required for this. Last wellness visit was 12/2022.  Member is looking forward to denture replacement in one year.    ACP Docs: Discussed with family and member who state it is against the Chinese culture to discuss this. Member denies at this time.    See [facility] for detailed assessment information.    Follow-Up Plan: Member informed of future contact, plan to f/u with member with a 6 month telephone assessment.  Contact information shared with member and family, encouraged member to call with any questions or concerns at any time.     [facility] care continuum providers: Please see Snapshot and Care Management Flowsheets for Specific details of care plan.    This CC note routed to PCP, David Turcios RN  [facility]  [phone]

## 2023-07-24 ENCOUNTER — PATIENT OUTREACH (OUTPATIENT)
Dept: GERIATRIC MEDICINE | Facility: CLINIC | Age: 78
End: 2023-07-24
Payer: COMMERCIAL

## 2023-07-24 NOTE — TELEPHONE ENCOUNTER
[facility] Care Coordination Contact    Received after visit chart from care coordinator.  Completed following tasks: Mailed copy of care plan to client, Mailed Safe Medication Disposal , Mailed [employer] Leave Behind Letter, Submitted referrals/auths for ADC (remote & in-person) and homemaking, and Updated services in Database  , Provider Signature - No POC Shared:  Member indicates that they do not want their POC shared with any EW providers.    , and [employer]:  Faxed completed PCA assessment to PCA Agency and mailed copies to member.  Faxed MD Communication to PCP.  Emailed referral form for auth to [employer].    Chelsea Bejarano  Care Management Specialist  [facility]  [phone]

## 2023-07-24 NOTE — LETTER
July 24, 2023    Important [employer] Information    MADY NICHOLE  [address]   [place] [address]  Your Care Plan  Dear Mady,  When we spoke recently, I promised to send you a Care Plan. The plan enclosed is a summary of our discussion. It includes the steps we agreed would help you meet your health goals. In addition, I can help you with:  Scnddep-D-[id number]  This program is available to members who need a ride to medical and dental visits. To schedule a ride, call [phone] or [phone] (toll free). TTY: 711. You can call Monday - Thursday 8 a.m. to 5 p.m. and Fridays 9 a.m. to 5 p.m.  One Pass  One Pass is your no-cost, complete, fitness solution for your mind and body. To learn more visit [employer]fitness or call One Pass, toll-free 1 [phone] (TTY: 711) 8 a.m. to 9 p.m. Monday-Friday.  Health Care Directive   This form helps you outline your health care wishes. You can request a form from me and I will answer any questions you have before you discuss it with your doctor.   Annual Physical  Take a key step on your path to good health and set up an annual physical at your clinic.  Questions?  Call me at [phone] Monday-Friday between 8am and 5pm.  TTY: 711. As we discussed, I plan to be in touch with you again in 6 months to follow up via phone.  Sincerely,    Fiorella Whitehead RN    Phone: [phone]   E-mail: [email or web address]    cc: member records

## 2023-09-19 ENCOUNTER — OFFICE VISIT (OUTPATIENT)
Dept: FAMILY MEDICINE | Facility: CLINIC | Age: 78
End: 2023-09-19
Payer: COMMERCIAL

## 2023-09-19 ENCOUNTER — TRANSFERRED RECORDS (OUTPATIENT)
Dept: HEALTH INFORMATION MANAGEMENT | Facility: CLINIC | Age: 78
End: 2023-09-19

## 2023-09-19 VITALS
BODY MASS INDEX: 27.66 KG/M2 | RESPIRATION RATE: 16 BRPM | OXYGEN SATURATION: 93 % | TEMPERATURE: 98 F | DIASTOLIC BLOOD PRESSURE: 70 MMHG | HEART RATE: 79 BPM | SYSTOLIC BLOOD PRESSURE: 104 MMHG | WEIGHT: 166.2 LBS

## 2023-09-19 DIAGNOSIS — E78.2 MIXED HYPERLIPIDEMIA: ICD-10-CM

## 2023-09-19 DIAGNOSIS — I78.1 TELANGIECTASIAS: ICD-10-CM

## 2023-09-19 DIAGNOSIS — I10 ESSENTIAL HYPERTENSION: ICD-10-CM

## 2023-09-19 DIAGNOSIS — L23.9 DERMAL HYPERSENSITIVITY REACTION: ICD-10-CM

## 2023-09-19 DIAGNOSIS — I83.10 VARICOSE VEINS OF LOWER EXTREMITY WITH INFLAMMATION, UNSPECIFIED LATERALITY: Primary | ICD-10-CM

## 2023-09-19 LAB
ANION GAP SERPL CALCULATED.3IONS-SCNC: 11 MMOL/L (ref 7–15)
BUN SERPL-MCNC: 16.3 MG/DL (ref 8–23)
CALCIUM SERPL-MCNC: 9.4 MG/DL (ref 8.8–10.2)
CHLORIDE SERPL-SCNC: 100 MMOL/L (ref 98–107)
CREAT SERPL-MCNC: 0.96 MG/DL (ref 0.67–1.17)
DEPRECATED HCO3 PLAS-SCNC: 27 MMOL/L (ref 22–29)
EGFRCR SERPLBLD CKD-EPI 2021: 80 ML/MIN/1.73M2
GLUCOSE SERPL-MCNC: 107 MG/DL (ref 70–99)
HGB BLD-MCNC: 16.1 G/DL (ref 13.3–17.7)
LDLC SERPL DIRECT ASSAY-MCNC: 69 MG/DL
POTASSIUM SERPL-SCNC: 4.1 MMOL/L (ref 3.4–5.3)
SODIUM SERPL-SCNC: 138 MMOL/L (ref 136–145)

## 2023-09-19 PROCEDURE — 80048 BASIC METABOLIC PNL TOTAL CA: CPT | Performed by: FAMILY MEDICINE

## 2023-09-19 PROCEDURE — 36415 COLL VENOUS BLD VENIPUNCTURE: CPT | Performed by: FAMILY MEDICINE

## 2023-09-19 PROCEDURE — 99214 OFFICE O/P EST MOD 30 MIN: CPT | Performed by: FAMILY MEDICINE

## 2023-09-19 PROCEDURE — 83721 ASSAY OF BLOOD LIPOPROTEIN: CPT | Performed by: FAMILY MEDICINE

## 2023-09-19 PROCEDURE — 85018 HEMOGLOBIN: CPT | Performed by: FAMILY MEDICINE

## 2023-09-19 RX ORDER — SIMVASTATIN 40 MG
40 TABLET ORAL AT BEDTIME
Qty: 90 TABLET | Refills: 3 | Status: SHIPPED | OUTPATIENT
Start: 2023-09-19

## 2023-09-19 RX ORDER — CETIRIZINE HYDROCHLORIDE 10 MG/1
10 TABLET ORAL DAILY
Qty: 90 TABLET | Refills: 3 | Status: SHIPPED | OUTPATIENT
Start: 2023-09-19

## 2023-09-19 RX ORDER — LOSARTAN POTASSIUM 100 MG/1
100 TABLET ORAL DAILY
Qty: 90 TABLET | Refills: 3 | Status: SHIPPED | OUTPATIENT
Start: 2023-09-19

## 2023-09-19 RX ORDER — ASPIRIN 81 MG/1
81 TABLET, CHEWABLE ORAL DAILY
Qty: 90 TABLET | Refills: 1 | Status: SHIPPED | OUTPATIENT
Start: 2023-09-19 | End: 2024-03-12

## 2023-09-19 ASSESSMENT — PAIN SCALES - GENERAL: PAINLEVEL: NO PAIN (0)

## 2023-09-19 NOTE — PROGRESS NOTES
"  Assessment & Plan     Dermal hypersensitivity reaction  Stable  Keep monitoring   - cetirizine (ZYRTEC) 10 MG tablet; Take 1 tablet (10 mg) by mouth daily  - aspirin (ASA) 81 MG chewable tablet; Take 1 tablet (81 mg) by mouth daily  - Hemoglobin; Future  - Basic metabolic panel  (Ca, Cl, CO2, Creat, Gluc, K, Na, BUN); Future  - LDL cholesterol direct; Future    Essential hypertension  Stable, keep monitoring   Will review the lab results and update pt   - losartan (COZAAR) 100 MG tablet; Take 1 tablet (100 mg) by mouth daily  - aspirin (ASA) 81 MG chewable tablet; Take 1 tablet (81 mg) by mouth daily  - Hemoglobin; Future  - Basic metabolic panel  (Ca, Cl, CO2, Creat, Gluc, K, Na, BUN); Future  - LDL cholesterol direct; Future    Mixed hyperlipidemia  stable  - simvastatin (ZOCOR) 40 MG tablet; Take 1 tablet (40 mg) by mouth At Bedtime  - aspirin (ASA) 81 MG chewable tablet; Take 1 tablet (81 mg) by mouth daily  - Hemoglobin; Future  - Basic metabolic panel  (Ca, Cl, CO2, Creat, Gluc, K, Na, BUN); Future  - LDL cholesterol direct; Future    Varicose veins of lower extremity with inflammation, unspecified laterality  Worsening with pain and telangiectasia, will have him to see vascular surgery for further evaluation    - aspirin (ASA) 81 MG chewable tablet; Take 1 tablet (81 mg) by mouth daily  - Vascular Surgery Referral; Future    Telangiectasias  Mentioned above   - Vascular Surgery Referral; Future           BMI:   Estimated body mass index is 27.66 kg/m  as calculated from the following:    Height as of 3/14/23: 1.651 m (5' 5\").    Weight as of this encounter: 75.4 kg (166 lb 3.2 oz).   Weight management plan: Discussed healthy diet and exercise guidelines    FUTURE APPOINTMENTS:       - Follow-up visit in 3 months for CPE    David Turcios MD  [facility] SUNI Veloz is a 78 year old, presenting for the following health issues:  Follow Up (Skin check), Back Pain, and " Hypertension        9/19/2023     2:17 PM   Additional Questions   Roomed by Ying HERNANDEZ   Accompanied by daughter       History of Present Illness       Back Pain:  He presents for follow up of back pain. Patient's back pain is a recurring problem.  Location of back pain:  Right middle of back  Description of back pain: cramping  Back pain spreads: right foot    Since patient first noticed back pain, pain is: unchanged  Does back pain interfere with his job:  Yes       He eats 4 or more servings of fruits and vegetables daily.He consumes 0 sweetened beverage(s) daily.He exercises with enough effort to increase his heart rate 20 to 29 minutes per day.  He exercises with enough effort to increase his heart rate 5 days per week. He is missing 7 dose(s) of medications per week.  He is not taking prescribed medications regularly due to remembering to take.       Review of Systems   Constitutional, HEENT, cardiovascular, pulmonary, gi and gu systems are negative, except as otherwise noted.      Objective    /70   Pulse 79   Temp 98  F (36.7  C) (Tympanic)   Resp 16   Wt 75.4 kg (166 lb 3.2 oz)   SpO2 93%   BMI 27.66 kg/m    Body mass index is 27.66 kg/m .  Physical Exam   GENERAL: healthy, alert and no distress  NECK: no adenopathy, no asymmetry, masses, or scars and thyroid normal to palpation  RESP: lungs clear to auscultation - no rales, rhonchi or wheezes  CV: regular rate and rhythm, normal S1 S2, no S3 or S4, no murmur, click or rub, no peripheral edema and peripheral pulses strong  ABDOMEN: soft, nontender, no hepatosplenomegaly, no masses and bowel sounds normal  MS: no gross musculoskeletal defects noted, no edema

## 2023-09-21 ENCOUNTER — TELEPHONE (OUTPATIENT)
Dept: VASCULAR SURGERY | Facility: CLINIC | Age: 78
End: 2023-09-21
Payer: COMMERCIAL

## 2023-10-10 ENCOUNTER — TRANSFERRED RECORDS (OUTPATIENT)
Dept: HEALTH INFORMATION MANAGEMENT | Facility: CLINIC | Age: 78
End: 2023-10-10

## 2023-10-10 PROCEDURE — 88305 TISSUE EXAM BY PATHOLOGIST: CPT | Mod: TC,ORL | Performed by: INTERNAL MEDICINE

## 2023-10-10 PROCEDURE — 88305 TISSUE EXAM BY PATHOLOGIST: CPT | Mod: 26 | Performed by: PATHOLOGY

## 2023-10-11 ENCOUNTER — LAB REQUISITION (OUTPATIENT)
Dept: LAB | Facility: CLINIC | Age: 78
End: 2023-10-11
Payer: COMMERCIAL

## 2023-10-11 DIAGNOSIS — D12.3 BENIGN NEOPLASM OF TRANSVERSE COLON: ICD-10-CM

## 2023-10-11 DIAGNOSIS — K64.4 RESIDUAL HEMORRHOIDAL SKIN TAGS: ICD-10-CM

## 2023-10-11 DIAGNOSIS — Z86.0100 PERSONAL HISTORY OF COLONIC POLYPS: ICD-10-CM

## 2023-10-12 LAB
PATH REPORT.COMMENTS IMP SPEC: NORMAL
PATH REPORT.COMMENTS IMP SPEC: NORMAL
PATH REPORT.FINAL DX SPEC: NORMAL
PATH REPORT.GROSS SPEC: NORMAL
PATH REPORT.MICROSCOPIC SPEC OTHER STN: NORMAL
PATH REPORT.RELEVANT HX SPEC: NORMAL
PHOTO IMAGE: NORMAL

## 2023-10-26 ENCOUNTER — PATIENT OUTREACH (OUTPATIENT)
Dept: GERIATRIC MEDICINE | Facility: CLINIC | Age: 78
End: 2023-10-26
Payer: COMMERCIAL

## 2023-10-26 NOTE — PROGRESS NOTES
[facility] Care Coordination Contact    CC was informed that this member's personal care attendant/homemaking agency ([employer]) is closing and will no longer be providing services. Member is transferring to [facility] for personal care attendant services who also works along side of [employer] [facility] for homemaking services.   CC spoke with assigned RN ([email or web address]) for these services. She is requesting the most up to date personal care attendant assessment, current waiver span, and authorization for these services to date of 10/23/2023.  This was completed today.    Fiorella Whitehead RN  [facility]  [phone]

## 2023-10-27 NOTE — TELEPHONE ENCOUNTER
[facility] Care Coordination Contact  Per CC, CMS submitted RRF to [employer] d/t agency change for PCA and homemaking.  Previous agency closed.  Provider Signature - No POC Shared:  Member indicates that they do not want their POC shared with any EW providers. and Member Signature - POC Change:  Per CC, member has made a change to their POC.  Care Plan Change Letter mailed to member for signature with a self-addressed return envelope.    Chelsea Bejarano  Care Management Specialist  [facility]  [phone]

## 2023-10-30 ENCOUNTER — OFFICE VISIT (OUTPATIENT)
Dept: VASCULAR SURGERY | Facility: CLINIC | Age: 78
End: 2023-10-30
Attending: FAMILY MEDICINE
Payer: COMMERCIAL

## 2023-10-30 DIAGNOSIS — I83.10 VARICOSE VEINS OF LOWER EXTREMITY WITH INFLAMMATION, UNSPECIFIED LATERALITY: ICD-10-CM

## 2023-10-30 DIAGNOSIS — I78.1 TELANGIECTASIAS: ICD-10-CM

## 2023-10-30 DIAGNOSIS — I83.891 VARICOSE VEINS OF RIGHT LOWER EXTREMITY WITH COMPLICATIONS: ICD-10-CM

## 2023-10-30 PROCEDURE — 99203 OFFICE O/P NEW LOW 30 MIN: CPT | Performed by: SURGERY

## 2023-10-30 NOTE — NURSING NOTE
Patient Reported symptoms:    Right leg   Heaviness None of the time   Achiness None of the time   Swelling Some of the time   Throbbing None of the time   Itching A good bit of the time   Appearance Moderately noticeable   Impact on work/activities Symptoms but full able to participate

## 2023-10-30 NOTE — LETTER
10/30/2023         RE: Roselia العراقي  [address]  [place] [address]        Dear Colleague,    Thank you for referring your patient, Roselia العراقي, to the [facility] [place]. Please see a copy of my visit note below.    VEIN CLINIC LEG DRAWING:            Memo Grier, [facility]: [place] العراقي is a 78-year-old very active Chinese male who developed painful right leg varicosities with no known complications.  Referred by primary care Dr David Turcios for further evaluation.    He has had varicose veins in his right calf and distal thigh for up to 20 years.  In 2008 he started noticing some color changes adjacent to the varicosities initially treated with steroid creams.  The varicosities have increased in size more recently though this has been a very gradual process.  He does notice more discomfort at the end of the day over the varicosities along with itchiness.  He puts up with the discomfort and does not take Tylenol or Advil since he is somewhat stoic Chinese gentleman.  He has not worn compression over the years that adds up to at least a year if not longer but does not like wearing these at all times.  It also does not help the varicosities in the distal thigh since he wears a calf high stockings because thigh-high would not stay in place.  He does notice itchiness with the stockings.  He can tolerate these in the winter months but not the hotter summer months.    He does have a few small varicosity in the left medial calf but these are completely asymptomatic.    The right leg he has never had a history of phlebitis, DVT, bleeding, ulceration.  He does have swelling but this is localized to the acute varicosities and not his distal calf or ankle.          PMH: Medications: Cozaar, Zocor, fish oil, aspirin   Medical: Hypertension    Hyperlipidemia on statin    History of gastric ulcer and GERD--resolved     History of smoking     10/10/2023 colonoscopy with  biopsy of benign tubular adenomas x2    FMH: Brother  of a pulmonary embolism following a left hip fracture which does worry the patient.  No history of hypercoagulable state and family.    Exam: Alert and appropriate.  Very friendly.  English is very poor but his daughter is his  (did not want the hospital  system)  HEENT= unremarkable with no carotid bruits  Chest= clear to auscultation with no wheezing  Cardiovascular= regular rate with no murmur  Abdomen= thin, soft, nontender  Extremities= no distal swelling.  Normal sensation.   +3 DP/PT pulses bilaterally   Proximately 3 mm varicose vein in the right distal medial thigh.  6 to 8 mm.  Dense varicosity complex in the proximal one half of the right posterior medial calf.  Distal greater saphenous vein is dilated to the ankle.  Some hemosiderin type skin changes are noted in the calf region.  Difficult to determine whether this is all stasis dermatitis but more likely not.  Skin is soft and supple with normal sensation otherwise.      IMPRESSION: #1.  Longstanding worsening symptomatic right calf and distal thigh varicose veins.  He certainly would explain his symptoms which are gradually progressing.  He has tried compression with difficulty use at all times though it did slightly help.  With the huge side to the varicosities I do feel that further evaluation and treatment would be indicated.  We will perform a right leg venous duplex ultrasound if this confirms incompetent greater saphenous vein would discussed the closure technique.  I would also recommend to perform cosmetic phlebectomies on the very large calf varicosities.  Though this is not absolutely necessary the very large size would warrant this treatment and discussed the reasons why.  This would be performed during the same operative procedure which would be done here in our clinic under light oral sedation the tumescent anesthetic that I discussed with the patient via his  daughter as an .  We also discussed her 72-hour follow-up duplex along with the compression protocol for 2 weeks.  Also where there is a chance he may have some temporary permanent numbness to the greater saphenous nerve since very likely the entire vein needs to be closed from the groin to the ankle due to the significant incompetence.    We will proceed with a venous duplex and I will see visit with the daughter via video or phone call once completed.     #2.  Asymptomatic left calf varicose veins.  No specific treatment is indicated nor evaluation.       #3.  We also discussed his brothers death following a hip fracture and pulmonary emboli.  Chance of this occurring with the patient with this type of surgery is minuscule and we discussed what we do to prevent this and the follow-up duplex ultrasound.    35 minutes with patient today.  All questions answered by the patient and his daughter.    Right leg VCSS=4   CEAP: C3      Memo Grier MD       Again, thank you for allowing me to participate in the care of your patient.        Sincerely,        Memo Grier MD

## 2023-10-30 NOTE — PROGRESS NOTES
VEIN CLINIC LEG DRAWING:            Memo Grier, [facility]: iGnette العراقي is a 78-year-old very active Chinese male who developed painful right leg varicosities with no known complications.  Referred by primary care Dr David Turcios for further evaluation.    He has had varicose veins in his right calf and distal thigh for up to 20 years.  In  he started noticing some color changes adjacent to the varicosities initially treated with steroid creams.  The varicosities have increased in size more recently though this has been a very gradual process.  He does notice more discomfort at the end of the day over the varicosities along with itchiness.  He puts up with the discomfort and does not take Tylenol or Advil since he is somewhat stoic Chinese gentleman.  He has not worn compression over the years that adds up to at least a year if not longer but does not like wearing these at all times.  It also does not help the varicosities in the distal thigh since he wears a calf high stockings because thigh-high would not stay in place.  He does notice itchiness with the stockings.  He can tolerate these in the winter months but not the hotter summer months.    He does have a few small varicosity in the left medial calf but these are completely asymptomatic.    The right leg he has never had a history of phlebitis, DVT, bleeding, ulceration.  He does have swelling but this is localized to the acute varicosities and not his distal calf or ankle.          PMH: Medications: Cozaar, Zocor, fish oil, aspirin   Medical: Hypertension    Hyperlipidemia on statin    History of gastric ulcer and GERD--resolved     History of smoking     10/10/2023 colonoscopy with biopsy of benign tubular adenomas x2    FMH: Brother  of a pulmonary embolism following a left hip fracture which does worry the patient.  No history of hypercoagulable state and family.    Exam: Alert and appropriate.  Very friendly.  English is  very poor but his daughter is his  (did not want the \[facility]\""  system)  HEENT= unremarkable with no carotid bruits  Chest= clear to auscultation with no wheezing  Cardiovascular= regular rate with no murmur  Abdomen= thin, soft, nontender  Extremities= no distal swelling.  Normal sensation.   +3 DP/PT pulses bilaterally   Proximately 3 mm varicose vein in the right distal medial thigh.  6 to 8 mm.  Dense varicosity complex in the proximal one half of the right posterior medial calf.  Distal greater saphenous vein is dilated to the ankle.  Some hemosiderin type skin changes are noted in the calf region.  Difficult to determine whether this is all stasis dermatitis but more likely not.  Skin is soft and supple with normal sensation otherwise.      IMPRESSION: #1.  Longstanding worsening symptomatic right calf and distal thigh varicose veins.  He certainly would explain his symptoms which are gradually progressing.  He has tried compression with difficulty use at all times though it did slightly help.  With the huge side to the varicosities I do feel that further evaluation and treatment would be indicated.  We will perform a right leg venous duplex ultrasound if this confirms incompetent greater saphenous vein would discussed the closure technique.  I would also recommend to perform cosmetic phlebectomies on the very large calf varicosities.  Though this is not absolutely necessary the very large size would warrant this treatment and discussed the reasons why.  This would be performed during the same operative procedure which would be done here in our clinic under light oral sedation the tumescent anesthetic that I discussed with the patient via his daughter as an .  We also discussed her 72-hour follow-up duplex along with the compression protocol for 2 weeks.  Also where there is a chance he may have some temporary permanent numbness to the greater saphenous nerve since very likely  the entire vein needs to be closed from the groin to the ankle due to the significant incompetence.    We will proceed with a venous duplex and I will see visit with the daughter via video or phone call once completed.     #2.  Asymptomatic left calf varicose veins.  No specific treatment is indicated nor evaluation.       #3.  We also discussed his brothers death following a hip fracture and pulmonary emboli.  Chance of this occurring with the patient with this type of surgery is minuscule and we discussed what we do to prevent this and the follow-up duplex ultrasound.    35 minutes with patient today.  All questions answered by the patient and his daughter.    Right leg VCSS=4   CEAP: C3      Memo Grier MD

## 2023-11-10 ENCOUNTER — MYC MEDICAL ADVICE (OUTPATIENT)
Dept: FAMILY MEDICINE | Facility: CLINIC | Age: 78
End: 2023-11-10
Payer: COMMERCIAL

## 2023-11-10 ENCOUNTER — OFFICE VISIT (OUTPATIENT)
Dept: FAMILY MEDICINE | Facility: CLINIC | Age: 78
End: 2023-11-10
Payer: COMMERCIAL

## 2023-11-10 ENCOUNTER — NURSE TRIAGE (OUTPATIENT)
Dept: FAMILY MEDICINE | Facility: CLINIC | Age: 78
End: 2023-11-10

## 2023-11-10 VITALS
RESPIRATION RATE: 26 BRPM | HEIGHT: 68 IN | WEIGHT: 165 LBS | DIASTOLIC BLOOD PRESSURE: 68 MMHG | OXYGEN SATURATION: 95 % | SYSTOLIC BLOOD PRESSURE: 114 MMHG | TEMPERATURE: 96.9 F | HEART RATE: 80 BPM | BODY MASS INDEX: 25.01 KG/M2

## 2023-11-10 DIAGNOSIS — R06.09 DOE (DYSPNEA ON EXERTION): ICD-10-CM

## 2023-11-10 DIAGNOSIS — R00.0 TACHYCARDIA: Primary | ICD-10-CM

## 2023-11-10 PROCEDURE — 99214 OFFICE O/P EST MOD 30 MIN: CPT | Performed by: PHYSICIAN ASSISTANT

## 2023-11-10 PROCEDURE — 93000 ELECTROCARDIOGRAM COMPLETE: CPT | Performed by: PHYSICIAN ASSISTANT

## 2023-11-10 ASSESSMENT — PAIN SCALES - GENERAL: PAINLEVEL: NO PAIN (0)

## 2023-11-10 NOTE — PROGRESS NOTES
EKG shows NSR, T wave inversion of lead 2 and 3.  Recommend stress test and Holter monitor for further workup.  Monitor for CP or worsening SOB.  Seek urgent care if he should have any new symptoms    1. Tachycardia    - EKG 12-lead complete w/read - Clinics    2. VELAZQUEZ (dyspnea on exertion)    - Adult Leadless EKG Monitor 3 to 7 Days; Future  - NM Lexiscan stress test; Future      Subjective   Roselia is a 78 year old, presenting for the following health issues:  Heart Problem (Tachycardia concern)        11/10/2023    10:56 AM   Additional Questions   Roomed by Yumiko BILLY   Accompanied by Daughter       Heart Problem    History of Present Illness       Reason for visit:  Heart rate  Symptom onset:  1-3 days ago  Symptoms include:  No  Symptom intensity:  Mild  Symptom progression:  Staying the same  Had these symptoms before:  No  What makes it worse:  Havey breath  What makes it better:  No    He eats 2-3 servings of fruits and vegetables daily.He consumes 0 sweetened beverage(s) daily.He exercises with enough effort to increase his heart rate 30 to 60 minutes per day.  He exercises with enough effort to increase his heart rate 7 days per week.   He is taking medications regularly.       Roselia presents to the clinic with his daughter for evaluation of tachycardia and dyspnea on exertion.  He was alerted by his apple watch that his HR has been fast recently with exercise (191 BPM per review of his Apple watch today).  He noes that he has been more fatigued after going his daily walks, and is feeling more SOB while walking. No CP, diaphoresis, dizziness, n/v. He is not having symptoms now      Review of Systems   Constitutional, HEENT, cardiovascular, pulmonary, GI, , musculoskeletal, neuro, skin, endocrine and psych systems are negative, except as otherwise noted.      Objective    /68 (BP Location: Left arm, Patient Position: Sitting, Cuff Size: Adult Regular)   Pulse 80   Temp 96.9  F (36.1  C) (Tympanic)   " Resp 26   Ht 1.72 m (5' 7.72\")   Wt 74.8 kg (165 lb)   SpO2 95%   BMI 25.30 kg/m    Body mass index is 25.3 kg/m .  Physical Exam   GENERAL: healthy, alert and no distress  EYES: Eyes grossly normal to inspection, PERRL and conjunctivae and sclerae normal  HENT: ear canals and TM's normal, nose and mouth without ulcers or lesions  NECK: no adenopathy, no asymmetry, masses, or scars and thyroid normal to palpation  RESP: lungs clear to auscultation - no rales, rhonchi or wheezes  CV: regular rate and rhythm, normal S1 S2, no S3 or S4, no murmur, click or rub, no peripheral edema and peripheral pulses strong  PSYCH: mentation appears normal, affect normal/bright        EKG;  NSR, left axis deviation, some inversion in lead 2 and 3, no changes from previous EKG      "

## 2023-11-10 NOTE — TELEPHONE ENCOUNTER
Patients daughter reports pt has fast heart rate and high BP.   Pt has an Iphone watch and she received alert of HR change. Patients pulse is between 53-96 and daughter was unable to provide BP readings. Pt has SOB with exertion. No chest pain, palpitations or breathing problems at rest. Daughter wants reassurance that there is noting wrong since he has hx of high BP.  She is unable to provide other symptoms. OV was scheduled to evaluate symptoms today.         Reason for Disposition   Age > 60 years  (Exception: Brief heartbeat symptoms that went away and now feels well.)    Additional Information   Negative: Passed out (i.e., lost consciousness, collapsed and was not responding)   Negative: Shock suspected (e.g., cold/pale/clammy skin, too weak to stand, low BP, rapid pulse)   Negative: Difficult to awaken or acting confused (e.g., disoriented, slurred speech)   Negative: Visible sweat on face or sweat dripping down face   Negative: Unable to walk, or can only walk with assistance (e.g., requires support)   Negative: Received SHOCK from implantable cardiac defibrillator and has persisting symptoms (i.e., palpitations, lightheadedness)   Negative: Dizziness, lightheadedness, or weakness and heart beating very rapidly (e.g., > 140 / minute)   Negative: Dizziness, lightheadedness, or weakness and heart beating very slowly (e.g., < 50 / minute)   Negative: Sounds like a life-threatening emergency to the triager   Negative: Chest pain   Negative: Difficulty breathing   Negative: Dizziness, lightheadedness, or weakness   Negative: Heart beating very rapidly (e.g., > 140 / minute) and present now  (Exception: During exercise.)   Negative: Heart beating very slowly (e.g., < 50 / minute)  (Exception: Athlete and heart rate normal for caller.)   Negative: New or worsened shortness of breath with activity (dyspnea on exertion)   Negative: Patient sounds very sick or weak to the triager   Negative: Wearing a 'Holter monitor'  or 'cardiac event monitor'   Negative: Received SHOCK from implantable cardiac defibrillator (and now feels well)   Negative: Heart beating very rapidly (e.g., > 140 / minute) and not present now  (Exception: During exercise.)   Negative: Skipped or extra beat(s) and increases with exercise or exertion   Negative: Skipped or extra beat(s) and occurs 4 or more times per minute   Negative: History of heart disease (i.e., heart attack, bypass surgery, angina, angioplasty)  (Exception: Brief heartbeat symptoms that went away and now feels well.)    Protocols used: Heart Rate and Heartbeat [id number]-F-IR

## 2023-11-14 ENCOUNTER — ANCILLARY PROCEDURE (OUTPATIENT)
Dept: ULTRASOUND IMAGING | Facility: CLINIC | Age: 78
End: 2023-11-14
Attending: SURGERY
Payer: COMMERCIAL

## 2023-11-14 PROCEDURE — 93971 EXTREMITY STUDY: CPT | Mod: RT | Performed by: SURGERY

## 2023-11-15 ENCOUNTER — PATIENT OUTREACH (OUTPATIENT)
Dept: GERIATRIC MEDICINE | Facility: CLINIC | Age: 78
End: 2023-11-15
Payer: COMMERCIAL

## 2023-11-15 NOTE — PROGRESS NOTES
[facility] Care Coordination Contact    Member is requesting a REEMO watch. CC will place a referral.    Fiorella Whitehead RN  [facility]  [phone]

## 2023-11-20 ENCOUNTER — HOSPITAL ENCOUNTER (OUTPATIENT)
Dept: CARDIOLOGY | Facility: CLINIC | Age: 78
Discharge: HOME | End: 2023-11-20
Attending: PHYSICIAN ASSISTANT
Payer: COMMERCIAL

## 2023-11-20 ENCOUNTER — HOSPITAL ENCOUNTER (OUTPATIENT)
Dept: CARDIOLOGY | Facility: CLINIC | Age: 78
Discharge: HOME | End: 2023-11-20
Attending: PHYSICIAN ASSISTANT | Admitting: PHYSICIAN ASSISTANT
Payer: COMMERCIAL

## 2023-11-20 VITALS — HEART RATE: 71 BPM | SYSTOLIC BLOOD PRESSURE: 130 MMHG | DIASTOLIC BLOOD PRESSURE: 80 MMHG

## 2023-11-20 VITALS
OXYGEN SATURATION: 94 % | BODY MASS INDEX: 25.9 KG/M2 | SYSTOLIC BLOOD PRESSURE: 126 MMHG | WEIGHT: 165 LBS | HEART RATE: 74 BPM | HEIGHT: 67 IN | DIASTOLIC BLOOD PRESSURE: 74 MMHG

## 2023-11-20 DIAGNOSIS — R06.09 DOE (DYSPNEA ON EXERTION): ICD-10-CM

## 2023-11-20 LAB
CV STRESS MAX HR HE: 94
NUC STRESS EJECTION FRACTION: 61 %
RATE PRESSURE PRODUCT: NORMAL
STRESS ECHO BASELINE DIASTOLIC HE: 80
STRESS ECHO BASELINE HR: 70 BPM
STRESS ECHO BASELINE SYSTOLIC BP: 130
STRESS ECHO CALCULATED PERCENT HR: 67 %
STRESS ECHO LAST STRESS DIASTOLIC BP: 76
STRESS ECHO LAST STRESS SYSTOLIC BP: 124
STRESS ECHO TARGET HR: 141

## 2023-11-20 PROCEDURE — 78452 HT MUSCLE IMAGE SPECT MULT: CPT | Mod: 26 | Performed by: INTERNAL MEDICINE

## 2023-11-20 PROCEDURE — 93018 CV STRESS TEST I&R ONLY: CPT | Performed by: INTERNAL MEDICINE

## 2023-11-20 PROCEDURE — 343N000001 HC RX 343: Performed by: PHYSICIAN ASSISTANT

## 2023-11-20 PROCEDURE — 93016 CV STRESS TEST SUPVJ ONLY: CPT | Performed by: INTERNAL MEDICINE

## 2023-11-20 PROCEDURE — A9502 TC99M TETROFOSMIN: HCPCS | Performed by: PHYSICIAN ASSISTANT

## 2023-11-20 PROCEDURE — 93017 CV STRESS TEST TRACING ONLY: CPT

## 2023-11-20 PROCEDURE — 93242 EXT ECG>48HR<7D RECORDING: CPT

## 2023-11-20 PROCEDURE — 78452 HT MUSCLE IMAGE SPECT MULT: CPT

## 2023-11-20 PROCEDURE — 93248 EXT ECG>7D<15D REV&INTERPJ: CPT | Performed by: INTERNAL MEDICINE

## 2023-11-20 PROCEDURE — 250N000011 HC RX IP 250 OP 636: Performed by: PHYSICIAN ASSISTANT

## 2023-11-20 RX ORDER — REGADENOSON 0.08 MG/ML
0.4 INJECTION, SOLUTION INTRAVENOUS ONCE
Status: COMPLETED | OUTPATIENT
Start: 2023-11-20 | End: 2023-11-20

## 2023-11-20 RX ORDER — ACYCLOVIR 200 MG/1
0-1 CAPSULE ORAL
Status: DISCONTINUED | OUTPATIENT
Start: 2023-11-20 | End: 2023-11-21 | Stop reason: HOSPADM

## 2023-11-20 RX ORDER — ALBUTEROL SULFATE 90 UG/1
2 AEROSOL, METERED RESPIRATORY (INHALATION) EVERY 5 MIN PRN
Status: DISCONTINUED | OUTPATIENT
Start: 2023-11-20 | End: 2023-11-21 | Stop reason: HOSPADM

## 2023-11-20 RX ORDER — CAFFEINE CITRATE 20 MG/ML
60 SOLUTION INTRAVENOUS
Status: DISCONTINUED | OUTPATIENT
Start: 2023-11-20 | End: 2023-11-21 | Stop reason: HOSPADM

## 2023-11-20 RX ORDER — AMINOPHYLLINE 25 MG/ML
50-100 INJECTION, SOLUTION INTRAVENOUS
Status: DISCONTINUED | OUTPATIENT
Start: 2023-11-20 | End: 2023-11-21 | Stop reason: HOSPADM

## 2023-11-20 RX ADMIN — REGADENOSON 0.4 MG: 0.08 INJECTION, SOLUTION INTRAVENOUS at 14:44

## 2023-11-20 RX ADMIN — TETROFOSMIN 9.57 MILLICURIE: 1.38 INJECTION, POWDER, LYOPHILIZED, FOR SOLUTION INTRAVENOUS at 14:47

## 2023-11-20 RX ADMIN — TETROFOSMIN 3.29 MILLICURIE: 1.38 INJECTION, POWDER, LYOPHILIZED, FOR SOLUTION INTRAVENOUS at 13:21

## 2023-11-21 NOTE — RESULT ENCOUNTER NOTE
Roselia-  Here are your recent results.    I am pleased to inform you that your stress test with normal and does not show evidence of poor blood flow to your heart.    Please let me know if your symptoms persist and we can reevaluate    Ivan Ross PA-C

## 2023-11-23 ENCOUNTER — HOSPITAL ENCOUNTER (EMERGENCY)
Facility: CLINIC | Age: 78
Discharge: HOME | End: 2023-11-23
Attending: EMERGENCY MEDICINE | Admitting: EMERGENCY MEDICINE
Payer: COMMERCIAL

## 2023-11-23 VITALS
RESPIRATION RATE: 18 BRPM | OXYGEN SATURATION: 94 % | HEART RATE: 63 BPM | SYSTOLIC BLOOD PRESSURE: 137 MMHG | DIASTOLIC BLOOD PRESSURE: 83 MMHG | TEMPERATURE: 98.1 F

## 2023-11-23 DIAGNOSIS — I10 HYPERTENSION, UNSPECIFIED TYPE: Primary | ICD-10-CM

## 2023-11-23 DIAGNOSIS — R42 LIGHTHEADEDNESS: ICD-10-CM

## 2023-11-23 DIAGNOSIS — R42 DIZZINESS: ICD-10-CM

## 2023-11-23 LAB
ANION GAP SERPL CALCULATED.3IONS-SCNC: 11 MMOL/L (ref 7–15)
ATRIAL RATE - MUSE: 67 BPM
BASOPHILS # BLD AUTO: 0.1 10E3/UL (ref 0–0.2)
BASOPHILS NFR BLD AUTO: 1 %
BUN SERPL-MCNC: 16.9 MG/DL (ref 8–23)
CALCIUM SERPL-MCNC: 8.8 MG/DL (ref 8.8–10.2)
CHLORIDE SERPL-SCNC: 100 MMOL/L (ref 98–107)
CREAT SERPL-MCNC: 0.75 MG/DL (ref 0.67–1.17)
DEPRECATED HCO3 PLAS-SCNC: 23 MMOL/L (ref 22–29)
DIASTOLIC BLOOD PRESSURE - MUSE: NORMAL MMHG
EGFRCR SERPLBLD CKD-EPI 2021: >90 ML/MIN/1.73M2
EOSINOPHIL # BLD AUTO: 0.2 10E3/UL (ref 0–0.7)
EOSINOPHIL NFR BLD AUTO: 3 %
ERYTHROCYTE [DISTWIDTH] IN BLOOD BY AUTOMATED COUNT: 13.5 % (ref 10–15)
GLUCOSE SERPL-MCNC: 97 MG/DL (ref 70–99)
HCT VFR BLD AUTO: 48.6 % (ref 40–53)
HGB BLD-MCNC: 16 G/DL (ref 13.3–17.7)
IMM GRANULOCYTES # BLD: 0 10E3/UL
IMM GRANULOCYTES NFR BLD: 0 %
INTERPRETATION ECG - MUSE: NORMAL
LYMPHOCYTES # BLD AUTO: 1.6 10E3/UL (ref 0.8–5.3)
LYMPHOCYTES NFR BLD AUTO: 26 %
MCH RBC QN AUTO: 28.3 PG (ref 26.5–33)
MCHC RBC AUTO-ENTMCNC: 32.9 G/DL (ref 31.5–36.5)
MCV RBC AUTO: 86 FL (ref 78–100)
MONOCYTES # BLD AUTO: 0.5 10E3/UL (ref 0–1.3)
MONOCYTES NFR BLD AUTO: 9 %
NEUTROPHILS # BLD AUTO: 3.8 10E3/UL (ref 1.6–8.3)
NEUTROPHILS NFR BLD AUTO: 61 %
NRBC # BLD AUTO: 0 10E3/UL
NRBC BLD AUTO-RTO: 0 /100
P AXIS - MUSE: 44 DEGREES
PLATELET # BLD AUTO: 191 10E3/UL (ref 150–450)
POTASSIUM SERPL-SCNC: 3.9 MMOL/L (ref 3.4–5.3)
PR INTERVAL - MUSE: 168 MS
QRS DURATION - MUSE: 86 MS
QT - MUSE: 422 MS
QTC - MUSE: 445 MS
R AXIS - MUSE: -43 DEGREES
RBC # BLD AUTO: 5.66 10E6/UL (ref 4.4–5.9)
SODIUM SERPL-SCNC: 134 MMOL/L (ref 135–145)
SYSTOLIC BLOOD PRESSURE - MUSE: NORMAL MMHG
T AXIS - MUSE: 40 DEGREES
TROPONIN T SERPL HS-MCNC: 10 NG/L
TROPONIN T SERPL HS-MCNC: 11 NG/L
TSH SERPL DL<=0.005 MIU/L-ACNC: 1.52 UIU/ML (ref 0.3–4.2)
VENTRICULAR RATE- MUSE: 67 BPM
WBC # BLD AUTO: 6.2 10E3/UL (ref 4–11)

## 2023-11-23 PROCEDURE — 80048 BASIC METABOLIC PNL TOTAL CA: CPT | Performed by: EMERGENCY MEDICINE

## 2023-11-23 PROCEDURE — 84484 ASSAY OF TROPONIN QUANT: CPT | Performed by: EMERGENCY MEDICINE

## 2023-11-23 PROCEDURE — 84443 ASSAY THYROID STIM HORMONE: CPT | Performed by: EMERGENCY MEDICINE

## 2023-11-23 PROCEDURE — 85025 COMPLETE CBC W/AUTO DIFF WBC: CPT | Performed by: EMERGENCY MEDICINE

## 2023-11-23 PROCEDURE — 36415 COLL VENOUS BLD VENIPUNCTURE: CPT | Performed by: EMERGENCY MEDICINE

## 2023-11-23 PROCEDURE — 93005 ELECTROCARDIOGRAM TRACING: CPT

## 2023-11-23 PROCEDURE — 99284 EMERGENCY DEPT VISIT MOD MDM: CPT

## 2023-11-23 RX ORDER — MECLIZINE HYDROCHLORIDE 25 MG/1
25-50 TABLET ORAL 3 TIMES DAILY PRN
Qty: 30 TABLET | Refills: 1 | Status: SHIPPED | OUTPATIENT
Start: 2023-11-23

## 2023-11-23 ASSESSMENT — ACTIVITIES OF DAILY LIVING (ADL)
ADLS_ACUITY_SCORE: 35
ADLS_ACUITY_SCORE: 35

## 2023-11-23 NOTE — ED PROVIDER NOTES
History     Chief Complaint:  Dizziness       HPI   Edwarding Dulce Maria is a 78 year old male with history of hyperlipidemia and hypertension who presents with dizziness. The patient reports that he has had room-spinning dizziness and nausea since this morning. His daughter adds that she took his blood pressure this morning which was 150 systolic, and it did not come down as the morning progressed. The patient's son-in-law also notes that he appeared short of breath. He denies chest pain, abdominal pain, and double vision. The patient took his blood pressure medications but nothing else. He does not feel dizzy in the room and feels close to his baseline. Of note, the patient was diagnosed at [facility] ED with vertigo years ago, and he states that his symptoms now are similar but worse. His symptoms improved later on without medical intervention. The patient also has a Ziopatch ordered 3 days ago by his primary care provider for heart palpitations.    Independent Historian:   Daughter and son-in-law     Review of External Notes:   Reviewed prior ED visit from 3/30/2015.  Patient had MRI including MRA of the head and neck which was unremarkable.  Diagnosed with vertigo during that visit.      Medications:    Aspirin 81 mg  Zyrtec  Cozaar  Zocor    Past Medical History:    Dyspepsia  HLD  Vertigo  HTN  Benign neoplasm of colon  H. Pylori infection  Varicose veins  GERD  Gastric ulcer    Physical Exam   Patient Vitals for the past 24 hrs:   BP Temp Pulse Resp SpO2   11/23/23 1400 137/83 -- 63 18 94 %   11/23/23 1345 139/82 -- 65 14 94 %   11/23/23 1330 (!) 142/86 -- 64 12 97 %   11/23/23 1315 138/83 -- 64 (!) 0 96 %   11/23/23 1300 139/89 -- 65 (!) 6 95 %   11/23/23 1245 (!) 145/86 -- 64 11 96 %   11/23/23 1230 (!) 164/93 -- 66 13 95 %   11/23/23 1148 (!) 175/96 98.1  F (36.7  C) 70 18 92 %      Physical Exam  General: Alert, appears well-developed and well-nourished. Cooperative.     In mild  distress  HEENT:  Head:  Atraumatic  Ears:  External ears are normal  Mouth/Throat:  Oropharynx is without erythema or exudate and mucous membranes are moist.   Eyes:   Conjunctivae normal and EOM are normal. No scleral icterus. PERRL.   CV:  Normal rate, regular rhythm, normal heart sounds and radial pulses are 2+ and symmetric.  No murmur.  Resp:  Breath sounds are clear bilaterally    Non-labored, no retractions or accessory muscle use  GI:  Abdomen is soft, no distension, no tenderness. No rebound or guarding.  No CVA tenderness bilaterally  MS:  Normal range of motion. No edema.    Normal strength in all 4 extremities.     Back atraumatic.    No midline cervical, thoracic, or lumbar tenderness  Skin:  Warm and dry.  No rash or lesions noted.  Neuro:   Alert. Normal strength.  Sensation intact in all 4 extremities. GCS: 15    Cranial nerves 2-12 intact.  Psych: Normal mood and affect.    Emergency Department Course   ECG  ECG taken at 1149, ECG read at 1232  Sinus rhythm with occasional premature ventricular complexes  Left axis deviation  Abnormal ECG   No change as compared to prior, dated 12/26/14.  Rate 67 bpm. AR interval 168 ms. QRS duration 86 ms. QT/QTc 422/445 ms. P-R-T axes 44 -43 40.       Laboratory:  Labs Ordered and Resulted from Time of ED Arrival to Time of ED Departure   BASIC METABOLIC PANEL - Abnormal       Result Value    Sodium 134 (*)     Potassium 3.9      Chloride 100      Carbon Dioxide (CO2) 23      Anion Gap 11      Urea Nitrogen 16.9      Creatinine 0.75      GFR Estimate >90      Calcium 8.8      Glucose 97     TROPONIN T, HIGH SENSITIVITY - Normal    Troponin T, High Sensitivity 10     TSH WITH FREE T4 REFLEX - Normal    TSH 1.52     TROPONIN T, HIGH SENSITIVITY - Normal    Troponin T, High Sensitivity 11     CBC WITH PLATELETS AND DIFFERENTIAL    WBC Count 6.2      RBC Count 5.66      Hemoglobin 16.0      Hematocrit 48.6      MCV 86      MCH 28.3      MCHC 32.9      RDW 13.5       Platelet Count 191      % Neutrophils 61      % Lymphocytes 26      % Monocytes 9      % Eosinophils 3      % Basophils 1      % Immature Granulocytes 0      NRBCs per 100 WBC 0      Absolute Neutrophils 3.8      Absolute Lymphocytes 1.6      Absolute Monocytes 0.5      Absolute Eosinophils 0.2      Absolute Basophils 0.1      Absolute Immature Granulocytes 0.0      Absolute NRBCs 0.0          Emergency Department Course & Assessments:      Interventions:  Medications - No data to display     Assessments:  1230 I obtained history and examined the patient as noted above  1448 I rechecked the patient and discussed discharge instructions    Independent Interpretation (X-rays, CTs, rhythm strip):  None    Consultations/Discussion of Management or Tests:  None        Social Determinants of Health affecting care:   None    Disposition:  The patient was discharged to home.     Impression & Plan      Medical Decision Making:  Patient is a 78-year-old male who presents with elevated blood pressure and lightheadedness/dizziness this morning.  Symptoms lasted approximately 90 minutes but have fully resolved on arrival in the emergency department.  He is not having any focal neurologic deficits here in the emergency department.  He is not describing any dizziness or lightheadedness at this time.  He denies any prior history of stroke.  Of note patient had been evaluated for vertigo several years ago and had unremarkable MRI/MRA at that time.  Given elevated blood pressure reported by family we did obtain a EKG including laboratory work.  Thankfully no evidence of endorgan damage.  Normal renal function, electrolytes, EKG with no concerning ischemic changes and troponin studies have been nondynamic, very low concern for ACS.  No evidence of thyroid dysfunction, anemia.  Vital signs remained stable here and patient has had normal blood pressures on final recheck before consideration of disposition.  No indication for advanced CT  or MRI imaging of the brain today as patient is having no focal neurologic deficits and is no longer feeling dizzy while here in the emergency department.  Unclear whether his dizziness or lightheadedness was related to an elevated blood pressure earlier at home today.  Thankfully low suspicion for stroke or other sinister intracranial pathology today.  Plan for close outpatient follow-up with primary care in the next 1 week for recheck.  Return precautions understood for severely elevated blood pressures, new neurologic symptoms, or any new concerning symptom onset such as chest pain, shortness of breath, fever etc.  After all questions answered and return precautions understood, discharged home.    Diagnosis:    ICD-10-CM    1. Hypertension, unspecified type  I10       2. Dizziness  R42       3. Lightheadedness  R42          Discharge Medications:  New Prescriptions    MECLIZINE (ANTIVERT) 25 MG TABLET    Take 1-2 tablets (25-50 mg) by mouth 3 times daily as needed for dizziness (vertigo)      Scribe Disclosure:  I, Ishan Medrano, am serving as a scribe at 12:06 PM on 11/23/2023 to document services personally performed by Jose Becker MD based on my observations and the provider's statements to me.   11/23/2023   Jose Becker MD White, Scott, MD  11/23/23 4220

## 2023-11-23 NOTE — ED TRIAGE NOTES
Pt reports feeling dizzy since this am.  Hypertensive at home.     Triage Assessment (Adult)       Row Name 11/23/23 1145          Triage Assessment    Airway WDL WDL        Respiratory WDL    Respiratory WDL WDL        Skin Circulation/Temperature WDL    Skin Circulation/Temperature WDL WDL        Cardiac WDL    Cardiac WDL WDL     Cardiac Rhythm NSR        Peripheral/Neurovascular WDL    Peripheral Neurovascular WDL WDL        Cognitive/Neuro/Behavioral WDL    Cognitive/Neuro/Behavioral WDL --  dizziness

## 2023-11-28 ENCOUNTER — PATIENT OUTREACH (OUTPATIENT)
Dept: GERIATRIC MEDICINE | Facility: CLINIC | Age: 78
End: 2023-11-28
Payer: COMMERCIAL

## 2023-11-28 NOTE — PROGRESS NOTES
[facility] Care Coordination Contact  CC received notification of Emergency Room visit.  ER visit occurred on 11/23/2023 at [facility] with Dx of Hypertension, dizziness, and lightheadedness.    CC contacted this care coordinator and adult daughter Emilee  and reviewed discharge summary.  Member has a follow-up appointment with PCP: Yes: scheduled on 12/7/2023 for an annual wellness exam, but will discuss these issues.  Member has had a change in condition: No  New referrals placed: No  Home Visit Needed: No  Care plan reviewed and updated.  PCP notified of ED visit via EMR.    Fiorella Whitehead, STEWART  [facility]  [phone]

## 2023-12-07 ENCOUNTER — OFFICE VISIT (OUTPATIENT)
Dept: FAMILY MEDICINE | Facility: CLINIC | Age: 78
End: 2023-12-07
Payer: COMMERCIAL

## 2023-12-07 VITALS
WEIGHT: 163.8 LBS | OXYGEN SATURATION: 96 % | HEART RATE: 72 BPM | TEMPERATURE: 97.8 F | HEIGHT: 66 IN | SYSTOLIC BLOOD PRESSURE: 136 MMHG | BODY MASS INDEX: 26.33 KG/M2 | RESPIRATION RATE: 18 BRPM | DIASTOLIC BLOOD PRESSURE: 82 MMHG

## 2023-12-07 DIAGNOSIS — Z13.9 ENCOUNTER FOR SCREENING INVOLVING SOCIAL DETERMINANTS OF HEALTH (SDOH): ICD-10-CM

## 2023-12-07 DIAGNOSIS — Z00.01 ENCOUNTER FOR GENERAL ADULT MEDICAL EXAMINATION WITH ABNORMAL FINDINGS: Primary | ICD-10-CM

## 2023-12-07 DIAGNOSIS — Z12.5 SCREENING FOR PROSTATE CANCER: ICD-10-CM

## 2023-12-07 DIAGNOSIS — Z29.11 NEED FOR VACCINATION AGAINST RESPIRATORY SYNCYTIAL VIRUS: ICD-10-CM

## 2023-12-07 LAB
ALBUMIN SERPL BCG-MCNC: 4.4 G/DL (ref 3.5–5.2)
ALP SERPL-CCNC: 77 U/L (ref 40–150)
ALT SERPL W P-5'-P-CCNC: 20 U/L (ref 0–70)
ANION GAP SERPL CALCULATED.3IONS-SCNC: 11 MMOL/L (ref 7–15)
AST SERPL W P-5'-P-CCNC: 24 U/L (ref 0–45)
BILIRUB SERPL-MCNC: 0.5 MG/DL
BUN SERPL-MCNC: 15.7 MG/DL (ref 8–23)
CALCIUM SERPL-MCNC: 9.1 MG/DL (ref 8.8–10.2)
CHLORIDE SERPL-SCNC: 102 MMOL/L (ref 98–107)
CHOLEST SERPL-MCNC: 140 MG/DL
CREAT SERPL-MCNC: 0.9 MG/DL (ref 0.67–1.17)
DEPRECATED HCO3 PLAS-SCNC: 27 MMOL/L (ref 22–29)
EGFRCR SERPLBLD CKD-EPI 2021: 87 ML/MIN/1.73M2
ERYTHROCYTE [DISTWIDTH] IN BLOOD BY AUTOMATED COUNT: 13.6 % (ref 10–15)
FASTING STATUS PATIENT QL REPORTED: YES
GLUCOSE SERPL-MCNC: 101 MG/DL (ref 70–99)
HCT VFR BLD AUTO: 52.2 % (ref 40–53)
HDLC SERPL-MCNC: 35 MG/DL
HGB BLD-MCNC: 16.7 G/DL (ref 13.3–17.7)
LDLC SERPL CALC-MCNC: 50 MG/DL
MCH RBC QN AUTO: 27.8 PG (ref 26.5–33)
MCHC RBC AUTO-ENTMCNC: 32 G/DL (ref 31.5–36.5)
MCV RBC AUTO: 87 FL (ref 78–100)
NONHDLC SERPL-MCNC: 105 MG/DL
PLATELET # BLD AUTO: 209 10E3/UL (ref 150–450)
POTASSIUM SERPL-SCNC: 4.2 MMOL/L (ref 3.4–5.3)
PROT SERPL-MCNC: 7.7 G/DL (ref 6.4–8.3)
PSA SERPL DL<=0.01 NG/ML-MCNC: 3.36 NG/ML (ref 0–6.5)
RBC # BLD AUTO: 6.01 10E6/UL (ref 4.4–5.9)
SODIUM SERPL-SCNC: 140 MMOL/L (ref 135–145)
TRIGL SERPL-MCNC: 273 MG/DL
WBC # BLD AUTO: 6.5 10E3/UL (ref 4–11)

## 2023-12-07 PROCEDURE — 80061 LIPID PANEL: CPT | Performed by: FAMILY MEDICINE

## 2023-12-07 PROCEDURE — 36415 COLL VENOUS BLD VENIPUNCTURE: CPT | Performed by: FAMILY MEDICINE

## 2023-12-07 PROCEDURE — G0439 PPPS, SUBSEQ VISIT: HCPCS | Performed by: FAMILY MEDICINE

## 2023-12-07 PROCEDURE — 85027 COMPLETE CBC AUTOMATED: CPT | Performed by: FAMILY MEDICINE

## 2023-12-07 PROCEDURE — G0103 PSA SCREENING: HCPCS | Performed by: FAMILY MEDICINE

## 2023-12-07 PROCEDURE — 80053 COMPREHEN METABOLIC PANEL: CPT | Performed by: FAMILY MEDICINE

## 2023-12-07 RX ORDER — RESPIRATORY SYNCYTIAL VIRUS VACCINE 120MCG/0.5
0.5 KIT INTRAMUSCULAR ONCE
Qty: 1 EACH | Refills: 0 | Status: CANCELLED | OUTPATIENT
Start: 2023-12-07 | End: 2023-12-07

## 2023-12-07 RX ORDER — CHLORHEXIDINE GLUCONATE ORAL RINSE 1.2 MG/ML
SOLUTION DENTAL
COMMUNITY
Start: 2023-09-20

## 2023-12-07 ASSESSMENT — PAIN SCALES - GENERAL: PAINLEVEL: NO PAIN (0)

## 2023-12-07 ASSESSMENT — ACTIVITIES OF DAILY LIVING (ADL)
CURRENT_FUNCTION: SHOPPING REQUIRES ASSISTANCE
CURRENT_FUNCTION: HOUSEWORK REQUIRES ASSISTANCE

## 2023-12-07 ASSESSMENT — ENCOUNTER SYMPTOMS: DIZZINESS: 1

## 2023-12-07 NOTE — PATIENT INSTRUCTIONS
Patient Education   Personalized Prevention Plan  You are due for the preventive services outlined below.  Your care team is available to assist you in scheduling these services.  If you have already completed any of these items, please share that information with your care team to update in your medical record.  Health Maintenance Due   Topic Date Due     LUNG CANCER SCREENING  Never done     RSV VACCINE (Pregnancy & 60+) (1 - 1-dose 60+ series) Never done     Your Health Risk Assessment indicates you feel you are not in good health    A healthy lifestyle helps keep the body fit and the mind alert. It helps protect you from disease, helps you fight disease, and helps prevent chronic disease (disease that doesn't go away) from getting worse. This is important as you get older and begin to notice twinges in muscles and joints and a decline in the strength and stamina you once took for granted. A healthy lifestyle includes good healthcare, good nutrition, weight control, recreation, and regular exercise. Avoid harmful substances and do what you can to keep safe. Another part of a healthy lifestyle is stay mentally active and socially involved.    Good healthcare   Have a wellness visit every year.   If you have new symptoms, let us know right away. Don't wait until the next checkup.   Take medicines exactly as prescribed and keep your medicines in a safe place. Tell us if your medicine causes problems.   Healthy diet and weight control   Eat 3 or 4 small, nutritious, low-fat, high-fiber meals a day. Include a variety of fruits, vegetables, and whole-grain foods.   Make sure you get enough calcium in your diet. Calcium, vitamin D, and exercise help prevent osteoporosis (bone thinning).   If you live alone, try eating with others when you can. That way you get a good meal and have company while you eat it.   Try to keep a healthy weight. If you eat more calories than your body uses for energy, it will be stored as fat  "and you will gain weight.     Recreation   Recreation is not limited to sports and team events. It includes any activity that provides relaxation, interest, enjoyment, and exercise. Recreation provides an outlet for physical, mental, and social energy. It can give a sense of worth and achievement. It can help you stay healthy.    Mental Exercise and Social Involvement  Mental and emotional health is as important as physical health. Keep in touch with friends and family. Stay as active as possible. Continue to learn and challenge yourself.   Things you can do to stay mentally active are:  Learn something new, like a foreign language or musical instrument.   Play SCRABBLE or do crossword puzzles. If you cannot find people to play these games with you at home, you can play them with others on your computer through the Internet.   Join a games club--anything from card games to chess or checkers or lawn bowling.   Start a new hobby.   Go back to school.   Volunteer.   Read.   Keep up with world events.  Learning About Being Physically Active  What is physical activity?     Being physically active means doing any kind of activity that gets your body moving.  The types of physical activity that can help you get fit and stay healthy include:  Aerobic or \"cardio\" activities. These make your heart beat faster and make you breathe harder, such as brisk walking, riding a bike, or running. They strengthen your heart and lungs and build up your endurance.  Strength training activities. These make your muscles work against, or \"resist,\" something. Examples include lifting weights or doing push-ups. These activities help tone and strengthen your muscles and bones.  Stretches. These let you move your joints and muscles through their full range of motion. Stretching helps you be more flexible.  Reaching a balance between these three types of physical activity is important because each one contributes to your overall fitness.  What " "are the benefits of being active?  Being active is one of the best things you can do for your health. It helps you to:  Feel stronger and have more energy to do all the things you like to do.  Focus better at school or work.  Feel, think, and sleep better.  Reach and stay at a healthy weight.  Lose fat and build lean muscle.  Lower your risk for serious health problems, including diabetes, heart attack, high blood pressure, and some cancers.  Keep your heart, lungs, bones, muscles, and joints strong and healthy.  How can you make being active part of your life?  Start slowly. Make it your long-term goal to get at least 30 minutes of exercise on most days of the week. Walking is a good choice. You also may want to do other activities, such as running, swimming, cycling, or playing tennis or team sports.  Pick activities that you like--ones that make your heart beat faster, your muscles stronger, and your muscles and joints more flexible. If you find more than one thing you like doing, do them all. You don't have to do the same thing every day.  Get your heart pumping every day. Any activity that makes your heart beat faster and keeps it at that rate for a while counts.  Here are some great ways to get your heart beating faster:  Go for a brisk walk, run, or hike.  Go for a swim or bike ride.  Take an online exercise class or dance.  Play a game of touch football, basketball, or soccer.  Play tennis, pickleball, or racquetball.  Climb stairs.  Even some household chores can be aerobic. Just do them at a faster pace. Raking or mowing the lawn, sweeping the garage, and vacuuming and cleaning your home all can help get your heart rate up.  Strengthen your muscles during the week. You don't have to lift heavy weights or grow big, bulky muscles to get stronger. Doing a few simple activities that make your muscles work against, or \"resist,\" something can help you get stronger. Aim for at least twice a week.  For example, " "you can:  Do push-ups or sit-ups, which use your own body weight as resistance.  Lift weights or dumbbells or use stretch bands at home or in a gym or community center.  Stretch your muscles often. Stretching will help you as you become more active. It can help you stay flexible and loosen tight muscles. It can also help improve your balance and posture and can be a great way to relax.  Be sure to stretch the muscles you'll be using when you work out. It's best to warm your muscles slightly before you stretch them. Walk or do some other light aerobic activity for a few minutes. Then start stretching.  When you stretch your muscles:  Do it slowly. Stretching is not about going fast or making sudden movements.  Don't push or bounce during a stretch.  Hold each stretch for at least 15 to 30 seconds, if you can. You should feel a stretch in the muscle, but not pain.  Breathe out as you do the stretch. Then breathe in as you hold the stretch. Don't hold your breath.  If you're worried about how more activity might affect your health, have a checkup before you start. Follow any special advice your doctor gives you for getting a smart start.  Where can you learn more?  Go to https://www.[employer].net/patiented  Enter W332 in the search box to learn more about \"Learning About Being Physically Active.\"  Current as of: June 6, 2023               Content Version: 13.8    8961-9416 [employer].   Care instructions adapted under license by your healthcare professional. If you have questions about a medical condition or this instruction, always ask your healthcare professional. [employer] disclaims any warranty or liability for your use of this information.      Activities of Daily Living    Your Health Risk Assessment indicates you have difficulties with activities of daily living such as housework, bathing, preparing meals, taking medication, etc. Please make a follow up appointment for us to address " this issue in more detail.  Hearing Loss: Care Instructions  Overview     Hearing loss is a sudden or slow decrease in how well you hear. It can range from slight to profound. Permanent hearing loss can occur with aging. It also can happen when you are exposed long-term to loud noise. Examples include listening to loud music, riding motorcycles, or being around other loud machines.  Hearing loss can affect your work and home life. It can make you feel lonely or depressed. You may feel that you have lost your independence. But hearing aids and other devices can help you hear better and feel connected to others.  Follow-up care is a key part of your treatment and safety. Be sure to make and go to all appointments, and call your doctor if you are having problems. It's also a good idea to know your test results and keep a list of the medicines you take.  How can you care for yourself at home?  Avoid loud noises whenever possible. This helps keep your hearing from getting worse.  Always wear hearing protection around loud noises.  Wear a hearing aid as directed.  A professional can help you pick a hearing aid that will work best for you.  You can also get hearing aids over the counter for mild to moderate hearing loss.  Have hearing tests as your doctor suggests. They can show whether your hearing has changed. Your hearing aid may need to be adjusted.  Use other devices as needed. These may include:  Telephone amplifiers and hearing aids that can connect to a television, stereo, radio, or microphone.  Devices that use lights or vibrations. These alert you to the doorbell, a ringing telephone, or a baby monitor.  Television closed-captioning. This shows the words at the bottom of the screen. Most new TVs can do this.  TTY (text telephone). This lets you type messages back and forth on the telephone instead of talking or listening. These devices are also called TDD. When messages are typed on the keyboard, they are sent  "over the phone line to a receiving TTY. The message is shown on a monitor.  Use text messaging, social media, and email if it is hard for you to communicate by telephone.  Try to learn a listening technique called speechreading. It is not lipreading. You pay attention to people's gestures, expressions, posture, and tone of voice. These clues can help you understand what a person is saying. Face the person you are talking to, and have them face you. Make sure the lighting is good. You need to see the other person's face clearly.  Think about counseling if you need help to adjust to your hearing loss.  When should you call for help?  Watch closely for changes in your health, and be sure to contact your doctor if:    You think your hearing is getting worse.     You have new symptoms, such as dizziness or nausea.   Where can you learn more?  Go to https://www.[employer]Me.Ryzing/patiented  Enter R798 in the search box to learn more about \"Hearing Loss: Care Instructions.\"  Current as of: February 28, 2023               Content Version: 13.8    4967-9120 [employer].   Care instructions adapted under license by your healthcare professional. If you have questions about a medical condition or this instruction, always ask your healthcare professional. [employer] disclaims any warranty or liability for your use of this information.      Your Health Risk Assessment indicates you feel you are not in good emotional health.    Recreation   Recreation is not limited to sports and team events. It includes any activity that provides relaxation, interest, enjoyment, and exercise. Recreation provides an outlet for physical, mental, and social energy. It can give a sense of worth and achievement. It can help you stay healthy.    Mental Exercise and Social Involvement  Mental and emotional health is as important as physical health. Keep in touch with friends and family. Stay as active as possible. Continue to " learn and challenge yourself.   Things you can do to stay mentally active are:  Learn something new, like a foreign language or musical instrument.   Play SCRABBLE or do crossword puzzles. If you cannot find people to play these games with you at home, you can play them with others on your computer through the Internet.   Join a games club--anything from card games to chess or checkers or lawn bowling.   Start a new hobby.   Go back to school.   Volunteer.   Read.   Keep up with world events.  Preventing Falls: Care Instructions  Injuries and health problems such as trouble walking or poor eyesight can increase your risk of falling. So can some medicines. But there are things you can do to help prevent falls. You can exercise to get stronger. You can also arrange your home to make it safer.    Talk to your doctor about the medicines you take. Ask if any of them increase the risk of falls and whether they can be changed or stopped.   Try to exercise regularly. It can help improve your strength and balance. This can help lower your risk of falling.     Practice fall safety and prevention.    Wear low-heeled shoes that fit well and give your feet good support. Talk to your doctor if you have foot problems that make this hard.  Carry a cellphone or wear a medical alert device that you can use to call for help.  Use stepladders instead of chairs to reach high objects. Don't climb if you're at risk for falls. Ask for help, if needed.  Wear the correct eyeglasses, if you need them.    Make your home safer.    Remove rugs, cords, clutter, and furniture from walkways.  Keep your house well lit. Use night-lights in hallways and bathrooms.  Install and use sturdy handrails on stairways.  Wear nonskid footwear, even inside. Don't walk barefoot or in socks without shoes.    Be safe outside.    Use handrails, curb cuts, and ramps whenever possible.  Keep your hands free by using a shoulder bag or backpack.  Try to walk in  "well-lit areas. Watch out for uneven ground, changes in pavement, and debris.  Be careful in the winter. Walk on the grass or gravel when sidewalks are slippery. Use de-icer on steps and walkways. Add non-slip devices to shoes.    Put grab bars and nonskid mats in your shower or tub and near the toilet. Try to use a shower chair or bath bench when bathing.   Get into a tub or shower by putting in your weaker leg first. Get out with your strong side first. Have a phone or medical alert device in the bathroom with you.   Where can you learn more?  Go to https://www.[employer].net/patiented  Enter G117 in the search box to learn more about \"Preventing Falls: Care Instructions.\"  Current as of: July 18, 2023               Content Version: 13.8    5070-1233 [employer].   Care instructions adapted under license by your healthcare professional. If you have questions about a medical condition or this instruction, always ask your healthcare professional. [employer] disclaims any warranty or liability for your use of this information.      How to Get Up Safely After a Fall: Care Instructions  Overview     If you have injuries, health problems, or other reasons that may make it easy for you to fall at home, it is a good idea to learn how to get up safely after a fall. Learning how to get up correctly can help you avoid making an injury worse.  Also, knowing what to do if you cannot get up can help you stay safe until help arrives.  Follow-up care is a key part of your treatment and safety. Be sure to make and go to all appointments, and call your doctor if you are having problems. It's also a good idea to know your test results and keep a list of the medicines you take.  How can you care for yourself after a fall?  If you think you can get up  First lie still for a few minutes and think about how you feel. If your body feels okay and you think you can get up safely, follow the rest of the steps " below:  Look for a chair or other piece of furniture that is close to you.  Roll onto your side and rest. Roll by turning your head in the direction you want to roll, move your shoulder and arm, then hip and leg in the same direction.  Lie still for a moment to let your blood pressure adjust.  Slowly push your upper body up, lift your head, and take a moment to rest.  Slowly get up on your hands and knees, and crawl to the chair or other stable piece of furniture.  Put your hands on the chair.  Move one foot forward, and place it flat on the floor. Your other leg should be bent with the knee on the floor.  Rise slowly, turn your body, and sit in the chair. Stay seated for a bit and think about how you feel. Call for help. Even if you feel okay, let someone know what happened to you. You might not know that you have a serious injury.  If you cannot get up  If you think you are injured after a fall or you cannot get up, try not to panic.  Call out for help.  If you have a phone within reach or you have an emergency call device, use it to call for help.  If you do not have a phone within reach, try to slide yourself toward it. If you cannot get to the phone, try to slide toward a door or window or a place where you think you can be heard.  [place] or use an object to make noise so someone might hear you.  If you can reach something that you can use for a pillow, place it under your head. Try to stay warm by covering yourself with a blanket or clothing while you wait for help.  When should you call for help?   Call 911 anytime you think you may need emergency care. For example, call if:    You passed out (lost consciousness).     You cannot get up after a fall.     You have severe pain.   Call your doctor now or seek immediate medical care if:    You have new or worse pain.     You are dizzy or lightheaded.     You hit your head.   Watch closely for changes in your health, and be sure to contact your doctor if:    You do  "not get better as expected.   Where can you learn more?  Go to https://www.[employer].net/patiented  Enter G513 in the search box to learn more about \"How to Get Up Safely After a Fall: Care Instructions.\"  Current as of: November 13, 2022               Content Version: 13.8    0349-2058 [employer].   Care instructions adapted under license by your healthcare professional. If you have questions about a medical condition or this instruction, always ask your healthcare professional. [employer], [employer] disclaims any warranty or liability for your use of this information.         "

## 2023-12-07 NOTE — PROGRESS NOTES
"SUBJECTIVE:   Roselia is a 78 year old, presenting for the following:  Wellness Visit (Physical, fasting. )        12/7/2023     9:29 AM   Additional Questions   Roomed by Helene GARCIA   Accompanied by Daughter       Are you in the first 12 months of your Medicare coverage?  No    Healthy Habits:     In general, how would you rate your overall health?  Fair    Frequency of exercise:  None    Do you usually eat at least 4 servings of fruit and vegetables a day, include whole grains    & fiber and avoid regularly eating high fat or \"junk\" foods?  Yes    Taking medications regularly:  Yes    Ability to successfully perform activities of daily living:  Shopping requires assistance and housework requires assistance    Home Safety:  Lighting is poor    Hearing Impairment:  Need to ask people to speak up or repeat themselves    In the past 6 months, have you been bothered by leaking of urine?  No    In general, how would you rate your overall mental or emotional health?  Fair    Additional concerns today:  No      Today's PHQ-2 Score:       12/7/2023     9:28 AM   PHQ-2 ( 1999 [employer])   Q1: Little interest or pleasure in doing things 0   Q2: Feeling down, depressed or hopeless 0   PHQ-2 Score 0   Q1: Little interest or pleasure in doing things Not at all   Q2: Feeling down, depressed or hopeless Not at all   PHQ-2 Score 0           Have you ever done Advance Care Planning? (For example, a Health Directive, POLST, or a discussion with a medical provider or your loved ones about your wishes): No, advance care planning information given to patient to review.  Patient declined advance care planning discussion at this time.       Fall risk  Fallen 2 or more times in the past year?: Yes  Any fall with injury in the past year?: No    Cognitive Screening   1) Repeat 3 items (Leader, Season, Table)    2) Clock draw: NORMAL  3) 3 item recall: Recalls 2 objects   Results: NORMAL clock, 1-2 items recalled: COGNITIVE IMPAIRMENT LESS " LIKELY    Mini-CogTM Copyright RIANA Root. Licensed by the author for use in [facility]; reprinted with permission (shane@[facility]). All rights reserved.      Do you have sleep apnea, excessive snoring or daytime drowsiness? : no    Reviewed and updated as needed this visit by clinical staff   Tobacco  Allergies  Meds              Reviewed and updated as needed this visit by Provider                 Social History     Tobacco Use     Smoking status: Former     Packs/day: 0.30     Years: 20.00     Additional pack years: 0.00     Total pack years: 6.00     Types: Cigarettes     Smokeless tobacco: Never   Substance Use Topics     Alcohol use: No             12/7/2023     9:28 AM   Alcohol Use   Prescreen: >3 drinks/day or >7 drinks/week? No          No data to display              Do you have a current opioid prescription? No  Do you use any other controlled substances or medications that are not prescribed by a provider? None        Current providers sharing in care for this patient include:   Patient Care Team:  David Turcios MD as PCP - General (Family Practice)  David Turcios MD as Assigned PCP  Alicia Whitehead RN as Lead Care Coordinator  Jyotsna Ding APRN CNP as Nurse Practitioner (Dermatology)  Jyotsna Ding APRN CNP as Assigned Surgical Provider  Memo Grier MD as Assigned Heart and Vascular Provider    The following health maintenance items are reviewed in Epic and correct as of today:  Health Maintenance   Topic Date Due     LUNG CANCER SCREENING  Never done     RSV VACCINE (Pregnancy & 60+) (1 - 1-dose 60+ series) Never done     MEDICARE ANNUAL WELLNESS VISIT  12/01/2023     ANNUAL REVIEW OF HM ORDERS  09/19/2024     FALL RISK ASSESSMENT  12/07/2024     COLORECTAL CANCER SCREENING  07/23/2025     LIPID  12/01/2027     ADVANCE CARE PLANNING  12/07/2028     DTAP/TDAP/TD IMMUNIZATION (2 - Td or Tdap) 03/21/2029     PHQ-2 (once per calendar year)  Completed     INFLUENZA  VACCINE  Completed     Pneumococcal Vaccine: 65+ Years  Completed     ZOSTER IMMUNIZATION  Completed     COVID-19 Vaccine  Completed     IPV IMMUNIZATION  Aged Out     HPV IMMUNIZATION  Aged Out     MENINGITIS IMMUNIZATION  Aged Out     RSV MONOCLONAL ANTIBODY  Aged Out     HEPATITIS C SCREENING  Discontinued     BP Readings from Last 3 Encounters:   12/07/23 136/82   11/23/23 137/83   11/20/23 126/74    Wt Readings from Last 3 Encounters:   12/07/23 74.3 kg (163 lb 12.8 oz)   11/20/23 74.8 kg (165 lb)   11/10/23 74.8 kg (165 lb)                  Patient Active Problem List   Diagnosis     Smoking     Dyspepsia     Asymptomatic varicose veins of both lower extremities     Hyperlipidemia LDL goal <130     H. pylori infection     Vertigo, benign positional     Essential hypertension     Hyperlipidemia with target LDL less than 130     Advanced directives, counseling/discussion     Left knee pain     Benign neoplasm of colon     Pain of finger of right hand     Trigger finger, acquired     No past surgical history on file.    Social History     Tobacco Use     Smoking status: Former     Packs/day: 0.30     Years: 20.00     Additional pack years: 0.00     Total pack years: 6.00     Types: Cigarettes     Smokeless tobacco: Never   Substance Use Topics     Alcohol use: No     Family History   Problem Relation Age of Onset     Hypertension Father      Cerebrovascular Disease Father      Hypertension Brother      Hyperlipidemia Maternal Grandmother          Current Outpatient Medications   Medication Sig Dispense Refill     aspirin (ASA) 81 MG chewable tablet Take 1 tablet (81 mg) by mouth daily 90 tablet 1     calcium carbonate 500 MG tablet Take 1 tablet by mouth daily Client taking it twice a day 100 tablet 3     cetirizine (ZYRTEC) 10 MG tablet Take 1 tablet (10 mg) by mouth daily 90 tablet 3     chlorhexidine (PERIDEX) 0.12 % solution SWISH WITH 15ML FOR 30 SECONDS,THEN SPIT OUT. USE TWO TIMES DAILY       fish  oil-omega-3 fatty acids 1000 MG capsule Take 2 capsules by mouth daily. 180 capsule 12     losartan (COZAAR) 100 MG tablet Take 1 tablet (100 mg) by mouth daily 90 tablet 3     mometasone (ELOCON) 0.1 % external cream Apply topically daily 50 g 4     Multiple Vitamins-Minerals (ONE-A-DAY MENS 50+ ADVANTAGE PO)        Multiple Vitamins-Minerals (PRESERVISION AREDS 2) CAPS Take 1 capsule by mouth daily 90 capsule 3     Multiple Vitamins-Minerals (PRESERVISION AREDS 2) CAPS        polyethylene glycol (MIRALAX) 17 GM/Dose powder Take 17 g (1 capful) by mouth daily 500 g 3     simvastatin (ZOCOR) 40 MG tablet Take 1 tablet (40 mg) by mouth At Bedtime 90 tablet 3     triamcinolone (KENALOG) 0.1 % external ointment Apply topically 2 times daily For active rash or itching on the legs.  Do not use if no redness or itching present 80 g 1     UNABLE TO FIND MEDICATION NAME: PreserVision AREDS 2 formula soft gel. 1 soft gel twice daily       meclizine (ANTIVERT) 25 MG tablet Take 1-2 tablets (25-50 mg) by mouth 3 times daily as needed for dizziness (vertigo) (Patient not taking: Reported on 12/7/2023) 30 tablet 1     predniSONE (DELTASONE) 10 MG tablet Take 3 tabs by mouth daily x 3 days, then 2 tabs daily x 3 days, then 1 tab daily x 3 days, then 1/2 tab daily x 3 days. (Patient not taking: Reported on 12/7/2023) 20 tablet 0     Allergies   Allergen Reactions     Penicillins      Sulfa Antibiotics Hives     Sulphasomidine      Tetracycline      Recent Labs   Lab Test 11/23/23  1218 09/19/23  1457 12/01/22  1329 04/13/22  1306 04/13/22  1306 10/12/21  1120 10/12/21  1120 04/09/21  1058 10/01/20  1153   LDL  --  69 79  --  56  --  104* 128* 57   HDL  --   --  39*  --   --   --  37*  --  41   TRIG  --   --  177*  --   --   --  219*  --  205*   ALT  --   --  28  --   --   --  75* 59 41   CR 0.75 0.96 0.96  --  0.84   < > 0.85 0.88 0.97   GFRESTIMATED >90 80 81  --  89   < > 84 83 75   GFRESTBLACK  --   --   --   --   --   --   --   ">90 87   POTASSIUM 3.9 4.1 4.4   < > 3.4   < > 3.6 4.0 4.4   TSH 1.52  --   --   --   --   --   --   --   --     < > = values in this interval not displayed.              Review of Systems   Skin:  Positive for rash.   Neurological:  Positive for dizziness.         OBJECTIVE:   /82   Pulse 72   Temp 97.8  F (36.6  C) (Temporal)   Resp 18   Ht 1.683 m (5' 6.26\")   Wt 74.3 kg (163 lb 12.8 oz)   SpO2 96%   BMI 26.23 kg/m   Estimated body mass index is 26.23 kg/m  as calculated from the following:    Height as of this encounter: 1.683 m (5' 6.26\").    Weight as of this encounter: 74.3 kg (163 lb 12.8 oz).  Physical Exam  GENERAL: healthy, alert and no distress  EYES: Eyes grossly normal to inspection, PERRL and conjunctivae and sclerae normal  HENT: ear canals and TM's normal, nose and mouth without ulcers or lesions  NECK: no adenopathy, no asymmetry, masses, or scars and thyroid normal to palpation  RESP: lungs clear to auscultation - no rales, rhonchi or wheezes  CV: regular rate and rhythm, normal S1 S2, no S3 or S4, no murmur, click or rub, no peripheral edema and peripheral pulses strong  ABDOMEN: soft, nontender, no hepatosplenomegaly, no masses and bowel sounds normal  MS: no gross musculoskeletal defects noted, no edema  SKIN: no suspicious lesions or rashes  NEURO: Normal strength and tone, mentation intact and speech normal  BACK: no CVA tenderness, no paralumbar tenderness  PSYCH: mentation appears normal, affect normal/bright  LYMPH: no cervical, supraclavicular, axillary, or inguinal adenopathy        ASSESSMENT / PLAN:       ICD-10-CM    1. Encounter for general adult medical examination with abnormal findings  Z00.01 CBC with platelets     Comprehensive metabolic panel (BMP + Alb, Alk Phos, ALT, AST, Total. Bili, TP)     Lipid panel reflex to direct LDL Fasting     PSA, screen      2. Need for vaccination against respiratory syncytial virus  Z29.11       3. Encounter for screening involving " social determinants of health (SDoH)  Z13.9       4. Screening for prostate cancer  Z12.5 PSA, screen          Patient has been advised of split billing requirements and indicates understanding: Yes      COUNSELING:  Reviewed preventive health counseling, as reflected in patient instructions        He reports that he has quit smoking. His smoking use included cigarettes. He has a 6.00 pack-year smoking history. He has never used smokeless tobacco.      Appropriate preventive services were discussed with this patient, including applicable screening as appropriate for fall prevention, nutrition, physical activity, Tobacco-use cessation, weight loss and cognition.  Checklist reviewing preventive services available has been given to the patient.    Reviewed patients plan of care and provided an AVS. The Basic Care Plan (routine screening as documented in Health Maintenance) for Lim[place] meets the Care Plan requirement. This Care Plan has been established and reviewed with the Patient.          David Turcios MD  [facility]    Identified Health Risks:  I have reviewed Opioid Use Disorder and Substance Use Disorder risk factors and made any needed referrals.

## 2023-12-07 NOTE — PROGRESS NOTES
The patient was provided with suggestions to help him develop a healthy physical lifestyle.  He is at risk for lack of exercise and has been provided with information to increase physical activity for the benefit of his well-being.  The patient was provided with written information regarding signs of hearing loss.  The patient was provided with suggestions to help him develop a healthy emotional lifestyle.  He is at risk for falling and has been provided with information to reduce the risk of falling at home.

## 2023-12-08 NOTE — RESULT ENCOUNTER NOTE
Liming-  Here are your recent results.     Your holter monitor was read as normal.  It did not show any evidence of rapid heart rate or an irregular heart beat.     If your symptoms persist, please return for reevaluation.     Ivan Ross PA-C

## 2023-12-11 ENCOUNTER — VIRTUAL VISIT (OUTPATIENT)
Dept: VASCULAR SURGERY | Facility: CLINIC | Age: 78
End: 2023-12-11
Payer: COMMERCIAL

## 2023-12-11 DIAGNOSIS — I83.811 VARICOSE VEINS OF LEG WITH PAIN, RIGHT: Primary | ICD-10-CM

## 2023-12-11 PROCEDURE — 99443 PR PHYSICIAN TELEPHONE EVALUATION 21-30 MIN: CPT | Mod: 93 | Performed by: SURGERY

## 2023-12-11 NOTE — PROGRESS NOTES
[facility]: [place] returns for follow-up to discuss venous duplex study.  Initially evaluated 10/30/2023 for longstanding right leg varicose painful veins per particular in the right medial calf.  A few asymptomatic left medial calf small varicosities.  These have been present for 20 years but started to see stasis changes in 2008 along with increasing size and discomfort.  He is worn compression for years and have not been helpful.    12/7/2023 laboratory: K= 4.2 SCr= 0.90   LDL= 50 Hgb= 16.7 glucose= 101    Duplex performed 11/14/2023.  Segmental insufficiency right common femoral vein and distal superficial femoral vein.  Proximal-mid superficial femoral veins competent as is the popliteal vein and thus this is of no major clinical significance.  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  Maximum reflux 5477 ms.  This gives off the clinical varicosities in the mid and distal thigh and also proximal calf.  Varicosities measure up to 5.1 mm in diameter.  There is incompetence of the lesser saphenous vein from the junction to the proximal calf but no major varicosities coming off of the segment and thus not clinically significant.  Incompetent  is noted more posteriorly again of no clinical significance.      TELEPHONE CONSULTATION: I spoke with the patient with the aid of the guerita .  His daughter who is English is fairly good was on the line also.  Discussed these findings.  With his ongoing symptomatic right leg varicose veins I recommend closure of the right greater saphenous vein at least from the junction to the mid calf where there may be some numbness along the greater saphenous nerve distribution.  With his stasis changes the phlebectomies would be medically necessary and this was also discussed.    I sent him images of the incompetent greater saphenous vein to help him understand the procedure.  They had multiple questions about  this which were answered.    They also want to know whether this procedure was absolutely necessary and we discussed this.  He could continue wearing his compression but as mentioned this was becoming less and less effective.  He is a very stoic individual and does not take any oral medications when his leg is sore.    I did mention that with the large size of the calf varicosities that I would expect to be extremely pleased with the results of the surgery.  However, this is a decision that he and his family needs to make if he would like to have this done.    The  went through all this with the patient and his family.  They were going to discuss this amongst themselves and call us if he would like to go ahead with the right leg surgery.    Spent 23 minutes on the phone call today with the family and the .  This was completed at 1625 hrs.    Memo Grier MD

## 2023-12-11 NOTE — LETTER
12/11/2023         RE: Roselia العراقي  [address]  [place] [address]        Dear Colleague,    Thank you for referring your patient, Roselia العراقي, to the [facility] [place]. Please see a copy of my visit note below.     [facility]: [place] العراقي returns for follow-up to discuss venous duplex study.  Initially evaluated 10/30/2023 for longstanding right leg varicose painful veins per particular in the right medial calf.  A few asymptomatic left medial calf small varicosities.  These have been present for 20 years but started to see stasis changes in 2008 along with increasing size and discomfort.  He is worn compression for years and have not been helpful.    12/7/2023 laboratory: K= 4.2 SCr= 0.90   LDL= 50 Hgb= 16.7 glucose= 101    Duplex performed 11/14/2023.  Segmental insufficiency right common femoral vein and distal superficial femoral vein.  Proximal-mid superficial femoral veins competent as is the popliteal vein and thus this is of no major clinical significance.  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  Maximum reflux 5477 ms.  This gives off the clinical varicosities in the mid and distal thigh and also proximal calf.  Varicosities measure up to 5.1 mm in diameter.  There is incompetence of the lesser saphenous vein from the junction to the proximal calf but no major varicosities coming off of the segment and thus not clinically significant.  Incompetent  is noted more posteriorly again of no clinical significance.      TELEPHONE CONSULTATION: I spoke with the patient with the aid of the guerita .  His daughter who is English is fairly good was on the line also.  Discussed these findings.  With his ongoing symptomatic right leg varicose veins I recommend closure of the right greater saphenous vein at least from the junction to the mid calf where there may be some numbness along the greater saphenous nerve  distribution.  With his stasis changes the phlebectomies would be medically necessary and this was also discussed.    I sent him images of the incompetent greater saphenous vein to help him understand the procedure.  They had multiple questions about this which were answered.    They also want to know whether this procedure was absolutely necessary and we discussed this.  He could continue wearing his compression but as mentioned this was becoming less and less effective.  He is a very stoic individual and does not take any oral medications when his leg is sore.    I did mention that with the large size of the calf varicosities that I would expect to be extremely pleased with the results of the surgery.  However, this is a decision that he and his family needs to make if he would like to have this done.    The  went through all this with the patient and his family.  They were going to discuss this amongst themselves and call us if he would like to go ahead with the right leg surgery.    Spent 23 minutes on the phone call today with the family and the .  This was completed at 1625 hrs.    Memo Grier MD     Roselia is a 78 year old who is being evaluated via a billable telephone visit.      What phone number would you like to be contacted at? [phone]  How would you like to obtain your AVS? MyChart    Distant Location (provider location):  On-site    Phone call duration: 23 minutes        Again, thank you for allowing me to participate in the care of your patient.        Sincerely,        Memo Grier MD

## 2023-12-11 NOTE — PROGRESS NOTES
Roselia is a 78 year old who is being evaluated via a billable telephone visit.      What phone number would you like to be contacted at? [phone]  How would you like to obtain your AVS? Apoorva    Distant Location (provider location):  On-site    Phone call duration: 23 minutes

## 2023-12-12 NOTE — PROGRESS NOTES
December 12, 2023    Vein Procedure Recommendation    Called and spoke with patient's daughter Emilee.    Dr. Grier has recommended patient to have the following vein procedure(s):     1. Right leg VNUS closure GSV and 1 unit Phlebectomies (medically necessary)    Per pt's daughter, pt may only want to have the ablation done.    Patient Pre-op Questions:  Preferred [facility]acy[facility] in [place]  Anticoagulant/ASA: Yes, ASA 81mg  Artificial Joint or Heart Valve:  No  Open ulcer:  No  Sedation nausea: No    Patient is recommended to wear Thigh High compression hose following his procedure. Discussed compression hose. Explained to patient if insurance doesn't cover the compression hose there are a couple different options to getting them and to call the clinic to let us know if they need help. Pt's daughter agreed to purchase a pair of thigh high hose from the clinic on [address] - black, closed-toe, size 2.    Entered in patient's After Visit Summary (viewable in [employer]) written procedure instructions to review on his own (see After Visit Summary).    Next steps:    Pt would like cost estimates from Jossy, surgery scheduler.     Insurance Submission  Informed patient this process could take up to 14 business days, but once approved, the patient will be contacted by our surgery scheduler to schedule the above procedure. Gave patient our surgery scheduler's information.    Patient's daughter is in agreement with all of the above and has no further questions at this time.    Raina Perez RN  [facility]  Vein Clinic

## 2023-12-12 NOTE — PATIENT INSTRUCTIONS
Jossy Palacios, Surgery Scheduler - [phone].          Pre-Procedure Instructions:                           VNUS Closure and Phlebectomies   You are having a Closure(s) - where one or more veins are closed and Phlebectomies - where one or more veins are removed.   Insurance  Precertification and/or referral authorization may be required by your insurance company.  We will call your insurance company to verify benefits for the medically necessary part of your procedure.    Your Current Medications and Allergies  To reduce bruising, please do not take aspirin-type medications (Motrin, Aleve, Ibuprofen, Advil, etc.) for three days before your procedure. You may take Tylenol if you need a pain reliever.  Are you on blood thinner medications? (Plavix, Coumadin, Eliquis, Xarelto) Please discuss this with your surgeon. You may resume taking your blood thinner medication after your procedure.  Are you sensitive to latex or adhesives used for fake fingernails? Please let us know!    Driving Escort and   Please arrange to have a trusted adult (18 years old or older) drive you to and from the clinic.  For your safety, we recommend you have a trusted adult to stay with you until the next morning.    Your Health  If you have a change in your health before the procedure, contact our office immediately. (For example: cold symptoms, cough, urinary tract infection, fever, flu symptoms.)  A pre-procedure physical is not required.    Note  It is sometimes necessary to adjust the procedure schedule due to emergencies. We greatly appreciate your flexibility and understanding in this matter.                  Check List: The Morning of Your Procedure  ___1. Please do not put anything on your leg(s) or shave the day of your procedure.  ___2. You may take your normal medications the day of your procedure.  ___3. It is recommended you eat a light breakfast or lunch the day of your procedure.  ___4. Wear comfortable  loose-fitting clothing and wide-fitting shoes (i.e. tennis shoes, slip-ons).  ___5. Please arrive at our clinic at the specified time given by the nurse.  ___6. You will sign an affirmation of informed consent.  ___7. Bring your pre-procedure sedation medication (lorazepam and clonidine) with you to the clinic.              One hour before your procedure, you will be instructed to take these medications. The lorazepam              (Ativan) lowers anxiety and sedates you; the clonidine makes the lorazepam more effective. Everyone's              body processes these medications differently. Therefore, reactions to these medications vary. Some              people stay awake and some people sleep through the whole procedure. You may not remember              everything about the procedure or the day. You do not want to make any big decisions for the rest of the              day.    The Day of Your Procedure:       VNUS Closure and Phlebectomies  In the Exam Room  A nurse will bring you back to an exam room with your family member or friend. This is when your informed consent will be signed, and you will take your pre-procedure medications.  You will be asked to remove everything from the waist down, including undergarments. You will then put on a hospital gown or shorts and blue booties.  Your surgeon will come in to answer any questions and sejal any bulging varicose veins to be removed.  You will be taken to the restroom to empty your bladder before going into the procedure room.    In the Procedure Room  You will be escorted to the procedure room. You will lie on a procedure table covered with a sheet or blanket.  A nurse will put a blood pressure cuff on your arm and a pulse/oxygen monitor on a finger. Your vital signs will be monitored every 15 minutes.  Your gown will be pulled up slightly and the groin exposed for a short period of time. The surgeon's assistant will clean your foot, leg, and groin with an  antibacterial solution. We will get you covered up as quickly as possible!  Sterile towels and blue drapes will be used to cover you and the table. You will be asked to keep your hands under the blue drapes during the procedure.  The lights will be turned down. The table will be tipped so your head is higher than your feet. You may feel like you're going to slide off, but you won't.    The Procedure  The surgeon will visualize your veins with an ultrasound machine. He or she will then numb your skin and access the vein. A catheter is passed up the vein and positioned with ultrasound guidance. The table will then be tipped head down.  Once the catheter is in the correct position, medication will be injected to numb your leg. You will feel some needle sticks and may feel discomfort as the medication goes in. Once this is done, you should not experience significant discomfort. But if you do, please let us know and more numbing medication can be injected. As the catheter sends out heat, the vein closes off and the catheter is withdrawn.  For the phlebectomy part of the procedure, small incisions are made where the bulging varicose veins have been marked on your leg(s) and these veins will be removed using a small karla hook instrument.    Post-Procedure  Once the procedure is done, your leg(s) will be washed with warm water and dried. Your leg(s) will be bandaged with large soft dressings and a large ACE bandage wrapped from toes to groin.   You will be offered something to drink and a light snack.  You will rest with your leg(s) elevated for approximately 30 minutes. Your friend or family member may join you.  For your safety, you will be taken to your car in a wheelchair. If you are able to, it is good to keep your leg(s) elevated on the car ride home.    Post-Procedure Instructions:             VNUS Closure and Phlebectomies      Post-Op Day Zero - The Day of Your Procedure:0  1. Medication for Pain Control and  Inflammation Control   - The numbing medication injected during your procedure will last for several hours. The pre-procedure                 tablets may make you very sleepy and you might not remember everything from the procedure or from                 the day. This will usually wear off by the next day.   - Ibuprofen:  If tolerated, take ibuprofen (e.g., Advil) to reduce inflammation whether or not you have                 pain. For three days, take two tablets (200 mg each) with every meal and at bedtime with a snack. If                 your pain is not controlled with ibuprofen, you may take prescription pain medication (such as Norco),                 if prescribed.   - You may resume taking any medications you were taking before your procedure.  2. Activity   - Rest with your leg(s) elevated above your heart. This will prevent from a lot of swelling and                 bleeding. You do not need to elevate your leg(s) while sleeping at night. You may go upstairs, sit up to                 eat, use the bathroom, and take several five-minute walks. Otherwise, keep your leg(s) elevated.                 Minimize the amount of time you are up on your feet to about 30 minutes at a time.  3. Bandages   - The incision sites will be covered with soft bandages and an ACE wrap. Keep your bandages on                 and dry for 48 hours. The ACE should provide  snug  compression but should not cause pain or                 numbness in the toes. If you have significant discomfort or your toes become cold or numb, unwrap                 your ACE and rewrap with less tension starting at the toes wrapping upward.  4. Incisions   - Bleeding: You may see some incision sites that are oozing through the bandages. This is not unusual      and can be managed with Rest, Ice, Compression and Elevation (RICE). Apply ice and firm pressure      directly to the site that is bleeding and rest with your leg(s) elevated above your heart  for 20-30                 minutes.    Post-Op Day One:  1. Medication   - Ibuprofen: Continue the same as the Day of Your Procedure. If your pain is not controlled with                 ibuprofen, you may take prescription pain medication (such as Norco), if prescribed.   2. Activity   - We would like you to get up at least six times and walk around for short periods of time, unless it is      causing you pain. You should not be on your feet more than 90 minutes at a time. Elevate your leg      above your heart when you are not walking.  3. Bandages   - Your bandages must be kept on and dry for 48 hours.  4. Driving   - You may resume driving when you can do so safely. Do not drive if you are taking narcotic pain      medication.  Post-Op Day Two:   1. Medication  - Ibuprofen: Continue the same as the Day of Your Procedure.  2.  Activity  - Walk as tolerated. Elevate as much as possible when not walking.  3. Bandages and Compression  - Remove ACE wrap and padding. Shower and put on your compression hose during waking hours only       for at least 5 days. (Your doctor may instruct you to keep your bandages on until your return     appointment; please follow your doctor's instructions.)  4. Incisions  - Your leg(s) will be bruised; there may be swelling, hard knots under the skin and possibly some     numbness. These will likely resolve over time. If you see  hair-like  strings coming out of your     incisions, do not pull them (this will only cause pain/discomfort). We will trim them when you come     back for your follow-up appointment.  5. Call Us If:   - You see any areas on your leg that are red and angry in appearance.   - You notice any drainage that is milky or cloudy in appearance or that has a foul odor.   - You run a temperature of 100.5 or greater.    Post-Op Day Three:  You will have a follow up appointment 2-4 days post-procedure. At this appointment, you will have an ultrasound and we will check your  incisions.    ________________________________________________________________________________________    The Two Weeks Following Your Procedure  1.  Skin Care   - Do not use any lotions, creams or powders on your incisions for 14 days or until the incisions have                 healed.   - Do not soak in a bathtub, hot tub or go swimming for 14 days or until your incisions have healed.  2.  Medications   - You may use ibuprofen or acetaminophen (e.g., Tylenol) as needed for pain or discomfort.  3.  Activity   - Do not lift over 25 pounds. After about two weeks you may resume exercise such as aerobics,                 running, tennis or weightlifting. Use your common sense and ease back into your exercise routine                 slowly.   - You may feel a cord-like tightness along the inside of your leg. Gentle stretching can be helpful.  4. Compression Hose   - Your doctor may instruct you to wear compression for longer than seven days; please follow your                 doctor's instructions. As a comfort measure, you may choose to wear compression for longer than                 required.  5.  Travel   - Do not fly in an airplane for 14 days after your procedure. If you have a long car trip planned within                 two to three weeks following your procedure, stop and walk for a few minutes every two hours.      Periodic ankle pumps during the ride may be helpful.    Six Week Appointment  - At your six-week appointment, you will see your surgeon for an exam and evaluation. This office visit     will be scheduled when you return for post-op day three return appointment.       Return to Work  1.  If you work outside the home, you may return to work in a few days depending on the extent of your        procedure, how you tolerate it, and the type of work you perform.  2.  Paperwork: If your employer requires paperwork or you would like a letter written to your employer, please        let us know. We will  complete disability type forms at no charge. Please allow five business days for forms        to be completed.       THESE INSTRUCTIONS BELOW ARE FOR VENOUS ABLATION ONLY:        Pre-Procedure Instructions:                                        VNUS Closure   You are having a VNUS Closure. One or more of your veins will be closed with radiofrequency heating.    Insurance  Precertification and/or referral authorization may be required by your insurance company.  We will call your insurance company to verify benefits for the medically necessary part of your procedure.    Your Current Medications and Allergies  Are you on blood thinner medications? (Aspirin, Plavix, Coumadin, Eliquis, Xarelto) Please discuss this with your surgeon.  Are you sensitive to latex or adhesives used for fake fingernails? Please let us know!     Driving Escort and   Please arrange to have a trusted adult (18 years old or older) drive you to and from the clinic.  For your safety, we recommend you have a trusted adult to stay with you until the next morning.    Your Health  If you have a change in your health before the procedure, contact our office immediately.  (For example: cold symptoms, cough, urinary tract infection, fever, flu symptoms.)  A pre-procedure physical is not required.    Note  It is sometimes necessary to adjust the procedure schedule due to emergencies. We greatly appreciate your flexibility and understanding in this matter.  ____________________________________________________________________________________  Check List:  The Morning of Your Procedure  ___1.  Please do not apply anything on your leg(s) or shave the day of your procedure.  ___2.  You may take your normal medications the day of your procedure.  ___3.  It is recommended you eat a light breakfast or lunch on the day of your procedure.  ___4.  Wear comfortable loose-fitting clothing and wide-fitting shoes (i.e. tennis shoes, slip-ons).  ___5.  Please  arrive at our clinic at the specified time given by the nurse.  ___6.  You will sign an affirmation of informed consent.  ___7.  Bring your pre-procedure sedation medication (lorazepam and clonidine) with you to the clinic.              One hour before your procedure, you will be instructed to take these medications. The lorazepam              (Ativan) lowers anxiety and sedates you; the clonidine makes the lorazepam more effective. Everyone's              body processes these medications differently. Therefore, reactions to these medications vary. Some              people stay awake and some people sleep through the whole procedure. You may not remember              everything about the procedure or the day. You do not want to make any big decisions for the rest of the              day.         The Day of Your Procedure:                  VNUS Closure  In the Exam Room  A nurse will bring you back to an exam room with your family member or friend. This is when your informed consent will be signed, and you will take your pre-procedure medications.  You will be asked to remove everything from the waist down, including undergarments. You will then put on a hospital gown or shorts and blue booties.  Your surgeon will come in to answer any questions and sejal the appropriate leg(s) with a marker.  You will be taken to the restroom to empty your bladder before going into the procedure room.    In the Procedure Room  You will be escorted to the procedure room. You will lie on a procedure table covered with a sheet or blanket.  A nurse will put a blood pressure cuff on your arm and a pulse/oxygen monitor on a finger. Your vital signs will be monitored every 15 minutes.  Your gown will be pulled up slightly and the groin exposed for a short period of time. The surgeon's assistant will clean your foot, leg, and groin with an antibacterial solution. We will get you covered up as quickly as possible!  Sterile towels and blue  drapes will be used to cover you and the table. You will be asked to keep your hands under the blue drapes during the procedure.    The Procedure  The surgeon will visualize your veins with an ultrasound machine. He or she will then numb your skin and access the vein. A catheter is passed up the vein and positioned with ultrasound guidance. The table will then be tipped head down.  Once the catheter is in the correct position, medication will be injected to numb your leg. You will feel some needle sticks and may feel discomfort as the medication goes in. Once this is done, you should not experience significant discomfort. But if you do, please let us know and more numbing medication can be injected. As the catheter sends out heat, the vein closes off and the catheter is withdrawn.    Post-Procedure  Once the procedure is done, your leg will be washed with warm water and dried. You will have one or more small bandages covering your incisions. Your compression hose will be put on or an ACE wrap from your toes to groin. If the ACE wrap feels too tight or binds, please elevate your leg and loosen it.  You will be offered something to drink and a light snack.  You will rest with your leg(s) elevated for approximately 30 minutes. Your friend or family member may join you.   For your safety, you will be accompanied to your car by a staff member.      Post-Procedure Instructions:                          VNUS Closure    Post-Op Day Zero - The Day of Your Procedure:  1. Medication for Pain Control and Inflammation Control   - The numbing medication injected during your procedure will last for several hours. The pre-procedure    tablets may make you very sleepy and you might not remember everything from the procedure or from    the day. This will usually wear off by the next day.   - Ibuprofen:  If tolerated, take ibuprofen (e.g., Advil) to reduce inflammation whether or not you have    pain. For three days, take two tablets  (200mg each) with every meal and at bedtime with a snack. If    you are not able to take Ibuprofen, Tylenol is another option.   - You may resume taking any medications you were taking before your procedure.  2. Activity   You may be up as tolerated when the sedation wears off. Elevate if possible when not walking.  3. Bandages   - You will have one or more small bandages covering the incision site(s) where we accessed the               vein(s). Keep your bandages on and dry for 48 hours. Compression hose should be worn continuously               over the bandages for the first 24 hours.  4. Incisions   - Bleeding: You may see some incision sites that are oozing through the bandages. This is not unusual    and can be managed with Rest, Ice, Compression and Elevation (RICE). Apply ice and firm pressure    directly to the site that is bleeding and rest with your leg(s) elevated above your heart for 20-30               minutes.    Post-Op Day One:  1. Medication   - Ibuprofen:  Continue the same as the Day of Your Procedure.  2. Activity   - Walk as tolerated. Resume your normal daily walking activities. If it hurts, stop. We encourage you to               walk. Elevate if possible when not walking.  3. Bandages and Compression   - After 24 hours, you may remove your compression hose to take a shower. Please keep your               bandage(s) on and intact. You may want to cover your bandage(s) to ensure it remains dry during your               shower. Reapply your compression hose after your shower and wear during waking hours only.  4. Driving   - You may resume driving when you can do so safely.    Post-Op Day Two:   1. Medication  - Ibuprofen:  Continue the same as the Day of Your Procedure.  2.  Activity   - Walk as tolerated. Elevate if possible when not walking.  3. Bandages and Compression  - You may remove your bandage after 48 hours. Continue wearing your compression hose during waking hours only for a total  of seven days following your procedure.  4. Incisions   - Your leg(s) may be bruised at or near incision site(s) and possibly have numb spots. This is normal.   5. Call Us If:   - You see any areas on your leg that are red and angry in appearance.   - You notice any drainage that is milky or cloudy in appearance or that has a foul odor.   - You run a temperature of 100.5 or greater.      Post-Op Day Three:  You will have a follow up appointment 2-4 days post-procedure. At this appointment, you will have an ultrasound and we will check your incisions.        The Two Weeks Following Your Procedure  1.  Skin Care              - Do not use any lotions, creams, or powders on your incision(s) for 14 days or until the incisions have               healed.              - Do not soak in a bathtub, hot tub or go swimming for 14 days or until your incisions have healed.  2.  Medications   - You may use ibuprofen or acetaminophen (e.g., Tylenol) as needed for pain or discomfort.  3.  Activity   - Do not lift over 25 pounds. After about two weeks you may resume exercise such as aerobics,               running, tennis or weightlifting. Use your common sense and ease back into your exercise routine              slowly.   - You may feel a cord-like tightness along the inside of your leg. Gentle stretching can be helpful.  4. Compression Hose   - Your doctor may instruct you to wear compression for longer than seven days; please    follow your doctor's instructions. As a comfort measure, you may choose to wear compression for    longer than required.  5.  Travel   - Do not fly in an airplane for 14 days after your procedure.  If you have a long car trip planned within    two to three weeks following your procedure, stop and walk for a few minutes every two hours.    Periodic ankle pumps during the ride may be helpful.    Six Week Appointment  - At your six-week appointment, you will see your surgeon for an exam and evaluation. This  office visit               will be scheduled when you return for post-op day three return appointment.     Return to Work  1.  If you work outside the home, you may return to work in a few days depending on the extent of your    procedure, how you tolerate it, and the type of work you perform.  2.  Paperwork: If your employer requires paperwork or you would like a letter written to your employer, please    let us know. We will complete disability type forms at no charge. Please allow five business days for    forms to be completed.

## 2024-01-16 ENCOUNTER — PATIENT OUTREACH (OUTPATIENT)
Dept: GERIATRIC MEDICINE | Facility: CLINIC | Age: 79
End: 2024-01-16
Payer: COMMERCIAL

## 2024-01-16 NOTE — PROGRESS NOTES
[facility] Care Coordination Contact      [facility] Six-Month Telephone Assessment    6 month telephone assessment completed on 01/16/2024.    ER visits: Yes -  [facility]  Hospitalizations: No  TCU stays: No  Significant health status changes: None  Falls/Injuries: No  ADL/IADL changes: No  Changes in services: No    Caregiver Assessment follow up:  NA    Goals: See POC in chart for goal progress documentation.      Roselia may need to have ablations on his varicose veins and already received authorization from his health plan. His daughter (Emilee) says Roselia is worried about this surgery because his brother had it and ended up dying from a blood clot. Emilee asked if the member needs this surgery; if it is necessary. CC encourages Emilee and the member to discuss this with his vascular surgeon, Dr. Grier, as he is one of the best. Emilee verbalizes understanding.    Will see member in 6 months for an annual health risk assessment.   Encouraged member to call CC with any questions or concerns in the meantime.     Fiorella Whitehead RN  [facility]  [phone]

## 2024-02-01 DIAGNOSIS — L30.0 NUMMULAR ECZEMA: ICD-10-CM

## 2024-02-01 RX ORDER — MOMETASONE FUROATE 1 MG/G
CREAM TOPICAL
Qty: 45 G | Refills: 1 | Status: SHIPPED | OUTPATIENT
Start: 2024-02-01

## 2024-03-07 NOTE — PROGRESS NOTES
[facility] Care Coordination Contact  Member off [place] since 5/31/18 and 90 days ling period with health plan ended 8/31/18. Will no longer follow. Member and daughter aware of the insurance issue and daughter has been working on trying to get member to become a citizen so he can have access to insurance. POC updated. Chart to CMS for processing disenrollment.   Chanda Pollack RN, PHN, [facility] Care Coordination  [phone]    
07-Mar-2024 16:46

## 2024-03-12 DIAGNOSIS — I83.10 VARICOSE VEINS OF LOWER EXTREMITY WITH INFLAMMATION, UNSPECIFIED LATERALITY: ICD-10-CM

## 2024-03-12 DIAGNOSIS — L23.9 DERMAL HYPERSENSITIVITY REACTION: ICD-10-CM

## 2024-03-12 DIAGNOSIS — E78.2 MIXED HYPERLIPIDEMIA: ICD-10-CM

## 2024-03-12 DIAGNOSIS — I10 ESSENTIAL HYPERTENSION: ICD-10-CM

## 2024-03-12 RX ORDER — ASPIRIN 81 MG
81 TABLET,CHEWABLE ORAL DAILY
Qty: 90 TABLET | Refills: 1 | Status: SHIPPED | OUTPATIENT
Start: 2024-03-12 | End: 2024-09-04

## 2024-03-12 NOTE — TELEPHONE ENCOUNTER
Prescription approved per [facility] Refill Protocol.  Kalyani Arellano RN  [facility]

## 2024-04-23 ENCOUNTER — PATIENT OUTREACH (OUTPATIENT)
Dept: GERIATRIC MEDICINE | Facility: CLINIC | Age: 79
End: 2024-04-23
Payer: COMMERCIAL

## 2024-04-23 NOTE — TELEPHONE ENCOUNTER
[facility] Care Coordination Contact    Received after visit chart from care coordinator.  Completed following tasks: Submitted referrals/auths for homemaking and PCA with [facility] and Updated services in Database    Provider Signature - No POC Shared:  Member indicates that they do not want their POC shared with any EW providers. and Member Signature - POC Change:  Per CC, member has made a change to their POC.  Care Plan Change Letter mailed to member for signature with a self-addressed return envelope.    Chelsea eBjarano  Care Management Specialist  [facility]  [phone]

## 2024-05-28 ENCOUNTER — PATIENT OUTREACH (OUTPATIENT)
Dept: GERIATRIC MEDICINE | Facility: CLINIC | Age: 79
End: 2024-05-28
Payer: COMMERCIAL

## 2024-05-28 NOTE — PROGRESS NOTES
[facility] Care Coordination Contact    Called member and adult daughter Emilee  to schedule annual HRA home visit. HRA has been scheduled for Thursday, June 13th at 9:30AM.  Called [facility] [employer] [facility] and scheduled an  for the home visit.    Fiorella Whitehead RN  [facility]  [phone]

## 2024-06-10 ENCOUNTER — OFFICE VISIT (OUTPATIENT)
Dept: FAMILY MEDICINE | Facility: CLINIC | Age: 79
End: 2024-06-10
Payer: COMMERCIAL

## 2024-06-10 VITALS
OXYGEN SATURATION: 96 % | TEMPERATURE: 98 F | HEART RATE: 68 BPM | WEIGHT: 165 LBS | BODY MASS INDEX: 26.42 KG/M2 | SYSTOLIC BLOOD PRESSURE: 138 MMHG | RESPIRATION RATE: 28 BRPM | DIASTOLIC BLOOD PRESSURE: 80 MMHG

## 2024-06-10 DIAGNOSIS — S90.229A SUBUNGUAL HEMATOMA OF FOOT, UNSPECIFIED LATERALITY, INITIAL ENCOUNTER: ICD-10-CM

## 2024-06-10 DIAGNOSIS — I10 ESSENTIAL HYPERTENSION: Primary | ICD-10-CM

## 2024-06-10 DIAGNOSIS — L30.0 NUMMULAR ECZEMA: ICD-10-CM

## 2024-06-10 DIAGNOSIS — I83.93 ASYMPTOMATIC VARICOSE VEINS OF BOTH LOWER EXTREMITIES: ICD-10-CM

## 2024-06-10 DIAGNOSIS — E78.2 MIXED HYPERLIPIDEMIA: ICD-10-CM

## 2024-06-10 LAB
ALT SERPL W P-5'-P-CCNC: 20 U/L (ref 0–70)
ANION GAP SERPL CALCULATED.3IONS-SCNC: 12 MMOL/L (ref 7–15)
BUN SERPL-MCNC: 13.9 MG/DL (ref 8–23)
CALCIUM SERPL-MCNC: 9.5 MG/DL (ref 8.8–10.2)
CHLORIDE SERPL-SCNC: 101 MMOL/L (ref 98–107)
CREAT SERPL-MCNC: 0.84 MG/DL (ref 0.67–1.17)
DEPRECATED HCO3 PLAS-SCNC: 25 MMOL/L (ref 22–29)
EGFRCR SERPLBLD CKD-EPI 2021: 88 ML/MIN/1.73M2
GLUCOSE SERPL-MCNC: 88 MG/DL (ref 70–99)
LDLC SERPL DIRECT ASSAY-MCNC: 87 MG/DL
POTASSIUM SERPL-SCNC: 4.2 MMOL/L (ref 3.4–5.3)
SODIUM SERPL-SCNC: 138 MMOL/L (ref 135–145)

## 2024-06-10 PROCEDURE — 83721 ASSAY OF BLOOD LIPOPROTEIN: CPT | Performed by: FAMILY MEDICINE

## 2024-06-10 PROCEDURE — 99214 OFFICE O/P EST MOD 30 MIN: CPT | Performed by: FAMILY MEDICINE

## 2024-06-10 PROCEDURE — 36415 COLL VENOUS BLD VENIPUNCTURE: CPT | Performed by: FAMILY MEDICINE

## 2024-06-10 PROCEDURE — 80048 BASIC METABOLIC PNL TOTAL CA: CPT | Performed by: FAMILY MEDICINE

## 2024-06-10 PROCEDURE — 84460 ALANINE AMINO (ALT) (SGPT): CPT | Performed by: FAMILY MEDICINE

## 2024-06-10 RX ORDER — RESPIRATORY SYNCYTIAL VIRUS VACCINE 120MCG/0.5
0.5 KIT INTRAMUSCULAR ONCE
Qty: 1 EACH | Refills: 0 | Status: CANCELLED | OUTPATIENT
Start: 2024-06-10 | End: 2024-06-10

## 2024-06-10 ASSESSMENT — PAIN SCALES - GENERAL: PAINLEVEL: NO PAIN (0)

## 2024-06-10 NOTE — PROGRESS NOTES
"  Assessment & Plan     Essential hypertension  Has been stable with current dose of meds, has no side effect from the meds, will keep monitoring   Will review the lab and update pt   - Basic metabolic panel  (Ca, Cl, CO2, Creat, Gluc, K, Na, BUN); Future  - LDL cholesterol direct; Future  - ALT; Future    Mixed hyperlipidemia  Stable   - Basic metabolic panel  (Ca, Cl, CO2, Creat, Gluc, K, Na, BUN); Future  - LDL cholesterol direct; Future  - ALT; Future    Nummular eczema  Has been stable  Keep monitoring     Asymptomatic varicose veins of both lower extremities  Has been stable without any clinical worsening, will keep monitoring     Subungual hematoma of foot, unspecified laterality, initial encounter  Encouraged him to continue monitoring           BMI  Estimated body mass index is 26.42 kg/m  as calculated from the following:    Height as of 12/7/23: 1.683 m (5' 6.26\").    Weight as of this encounter: 74.8 kg (165 lb).         FUTURE APPOINTMENTS:       - Follow-up visit in 6 months for AWV    Subjective   Liming is a 79 year old, presenting for the following health issues:  Hypertension and Lipids        6/10/2024     3:10 PM   Additional Questions   Roomed by Cecelia   Accompanied by Daughter     History of Present Illness       Hyperlipidemia:  He presents for follow up of hyperlipidemia.   He is taking medication to lower cholesterol. He is not having myalgia or other side effects to statin medications.    Hypertension: He presents for follow up of hypertension.  He does not check blood pressure  regularly outside of the clinic. Outpatient blood pressures have not been over 140/90. He follows a low salt diet.     Reason for visit:  Blood pressure follow up    He eats 4 or more servings of fruits and vegetables daily.He consumes 5 sweetened beverage(s) daily.He exercises with enough effort to increase his heart rate 20 to 29 minutes per day.  He exercises with enough effort to increase his heart rate 6 days " per week.   He is taking medications regularly.  Hyperlipidemia           Medication Followup of losartan and simvastatin  Taking Medication as prescribed: yes  Side Effects:  None  Medication Helping Symptoms:  not applicable        Review of Systems  Constitutional, HEENT, cardiovascular, pulmonary, GI, , musculoskeletal, neuro, skin, endocrine and psych systems are negative, except as otherwise noted.      Objective    /80   Pulse 68   Temp 98  F (36.7  C) (Temporal)   Resp 28   Wt 74.8 kg (165 lb)   SpO2 96%   BMI 26.42 kg/m    Body mass index is 26.42 kg/m .  Physical Exam   GENERAL: alert and no distress  EYES: Eyes grossly normal to inspection, PERRL and conjunctivae and sclerae normal  HENT: ear canals and TM's normal, nose and mouth without ulcers or lesions  NECK: no adenopathy, no asymmetry, masses, or scars  RESP: lungs clear to auscultation - no rales, rhonchi or wheezes  CV: regular rate and rhythm, normal S1 S2, no S3 or S4, no murmur, click or rub, no peripheral edema  ABDOMEN: soft, nontender, no hepatosplenomegaly, no masses and bowel sounds normal  MS: no gross musculoskeletal defects noted, no edema  SKIN: no suspicious lesions or rashes  NEURO: Normal strength and tone, mentation intact and speech normal  BACK: no CVA tenderness, no paralumbar tenderness            Signed Electronically by: David Turcios MD

## 2024-06-13 ENCOUNTER — PATIENT OUTREACH (OUTPATIENT)
Dept: GERIATRIC MEDICINE | Facility: CLINIC | Age: 79
End: 2024-06-13
Payer: COMMERCIAL

## 2024-06-13 ASSESSMENT — ACTIVITIES OF DAILY LIVING (ADL)
DEPENDENT_IADLS:: CLEANING;COOKING;LAUNDRY;SHOPPING;MEDICATION MANAGEMENT;MEAL PREPARATION;MONEY MANAGEMENT;TRANSPORTATION

## 2024-06-13 NOTE — PROGRESS NOTES
[facility] Care Coordination Contact    [facility] Home Visit Assessment     Home visit for Health Risk Assessment with Roselia العراقي completed on June 13, 2024    Type of residence:: Apartment - handicap accessible  Current living arrangement:: I live in a private home with spouse. (Assessment was completed at daughter's two story home as this is where he spends most of his days and evenings).    Assessment completed with:: Patient, Care Team Member, Children (daughter).    Current Care Plan  Member currently receiving the following home care services: None    Member currently receiving the following community resources: Day Care, DME, PCA    Medication Review  Medication reconciliation completed in Epic: Yes  Medication set-up & administration: Family/informal caregiver sets up weekly.  Family caregiver administers medications.  Medication Risk Assessment Medication (1 or more, place referral to MTM): Recent falls within past year  MTM Referral Placed: No: Member states he is not interested at this time.    Mental/Behavioral Health   Depression Screening: NA Completed with PCP at physical.     Mental health DX:: No   Mental health DX how managed:: None    Falls Assessment:   Fallen 2 or more times in the past year?: (!) Yes. Member states he has had two falls, none with fracture.  Any fall with injury in the past year?: No    ADL/IADL Dependencies:   Dependent ADLs:: Ambulation-cane  Dependent IADLs:: Cleaning, Cooking, Laundry, Shopping, Medication Management, Meal Preparation, Money Management, Transportation    Health Plan sponsored benefits: [employer] [facility]: Shared information regarding [employer] F[employer] Program. Reviewed preventative health screening and health plan supplemental benefits/incentives. Reviewed medication disposal form.    PCA Assessment completed at visit: Yes Annual PCA assessment indicated 3.5 hours per day of PCA. This is the same as the previous assessment.     Elderly Waiver  Eligibility: Yes-will continue on EW    Care Plan & Recommendations: None at this time. Please see POC.    See LTCC for detailed assessment information.    Follow-Up Plan: Member informed of future contact, plan to f/u with member with a 6 month telephone assessment.  Contact information shared with member and family, encouraged member to call with any questions or concerns at any time.    [place] care continuum providers: Please see Snapshot and Care Management Flowsheets for Specific details of care plan.    This CC note routed to PCP, David Turcios, STEWART  [facility]  [phone]

## 2024-06-20 ENCOUNTER — PATIENT OUTREACH (OUTPATIENT)
Dept: GERIATRIC MEDICINE | Facility: CLINIC | Age: 79
End: 2024-06-20
Payer: COMMERCIAL

## 2024-06-20 NOTE — TELEPHONE ENCOUNTER
[facility] Care Coordination Contact    Received after visit chart from care coordinator.  Completed following tasks: Updated services in Database   and [employer]:  Faxed completed PCA assessment to PCA Agency and mailed copies to member.  Faxed MD Communication to PCP.  Emailed referral form for auth to [employer].    Chelsea Bejarano  Care Management Specialist  [facility]  [phone]

## 2024-06-25 SDOH — HEALTH STABILITY: PHYSICAL HEALTH: ON AVERAGE, HOW MANY DAYS PER WEEK DO YOU ENGAGE IN MODERATE TO STRENUOUS EXERCISE (LIKE A BRISK WALK)?: 7 DAYS

## 2024-06-25 SDOH — HEALTH STABILITY: PHYSICAL HEALTH: ON AVERAGE, HOW MANY MINUTES DO YOU ENGAGE IN EXERCISE AT THIS LEVEL?: 30 MIN

## 2024-06-25 ASSESSMENT — SOCIAL DETERMINANTS OF HEALTH (SDOH)
DO YOU BELONG TO ANY CLUBS OR ORGANIZATIONS SUCH AS CHURCH GROUPS UNIONS, FRATERNAL OR ATHLETIC GROUPS, OR SCHOOL GROUPS?: YES
HOW OFTEN DO YOU ATTEND CHURCH OR RELIGIOUS SERVICES?: 1 TO 4 TIMES PER YEAR
HOW OFTEN DO YOU ATTENT MEETINGS OF THE CLUB OR ORGANIZATION YOU BELONG TO?: 1 TO 4 TIMES PER YEAR
HOW OFTEN DO YOU GET TOGETHER WITH FRIENDS OR RELATIVES?: MORE THAN THREE TIMES A WEEK
IN A TYPICAL WEEK, HOW MANY TIMES DO YOU TALK ON THE PHONE WITH FAMILY, FRIENDS, OR NEIGHBORS?: MORE THAN THREE TIMES A WEEK

## 2024-06-25 ASSESSMENT — LIFESTYLE VARIABLES
HOW MANY STANDARD DRINKS CONTAINING ALCOHOL DO YOU HAVE ON A TYPICAL DAY: PATIENT DOES NOT DRINK
SKIP TO QUESTIONS 9-10: 1
AUDIT-C TOTAL SCORE: 0
HOW OFTEN DO YOU HAVE A DRINK CONTAINING ALCOHOL: NEVER
HOW OFTEN DO YOU HAVE SIX OR MORE DRINKS ON ONE OCCASION: NEVER

## 2024-07-08 ENCOUNTER — PATIENT OUTREACH (OUTPATIENT)
Dept: GERIATRIC MEDICINE | Facility: CLINIC | Age: 79
End: 2024-07-08
Payer: COMMERCIAL

## 2024-07-08 NOTE — TELEPHONE ENCOUNTER
[facility] Care Coordination Contact    Received after visit chart from care coordinator.  Completed following tasks: Mailed copy of care plan/support plan to member, Mailed [place] Choices signature sheet pages 3-4, Mailed Safe Medication Disposal , Mailed [employer] Leave Behind Letter, Submitted referrals/auths for remote+in person ADC and homemaking, and Updated services in Database   and Provider Signature - No POC Shared:  Member indicates that they do not want their POC shared with any EW providers.    Chelsea Bejarano  Care Management Specialist  [facility]  [phone]

## 2024-07-08 NOTE — LETTER
July 8, 2024    Roselia العراقي  C/O Emilee العراقي  [address] [place] [address]    Kendall Roselia,    I hope you ve been well since we last spoke. Attached is your copy of your personalized Support Plan which summarizes our conversation.   My goal is to help you keep your health on track. Your Support Plan includes the steps we agreed would help you with that. We can talk about these steps during our next meeting or sooner if you d like.   Here are additional programs and services I can help you with:  Get to appointments with [facility]  If you need a ride to medical or dental visits, [facility] can help. Call to schedule a ride.   [phone] (TTY:[phone]), 8 a.m. - 6 p.m. CT, Monday - Friday  Access One-Pass to boost your health  Get a gym membership, at-home fitness classes, and free access to fun activities that help your brain. To get access, go to [employer] or call One-Pass.    [phone] (TTY:047), 8 a.m. - 9 p.m. CT, Monday - Friday  Get help paying for healthy food and utilities  As a member, you get these benefits:  $150 monthly allowance to buy healthy food at participating grocery stores  One $0 ride per day to participating grocery stores and back home  $100 monthly allowance to help pay your utility bills  If you don t know how to access these benefits, I'm here to help you and answer questions.  Set up your health care directive  A directive is a legal form that explains your health care wishes. You can request this form from me, and I ll walk you through it before you discuss your plans with your doctor.  Schedule your annual physical  I can help set up your annual physical at your clinic of choice. It's an important step towards good health.    Have questions? I m here to help.  Call me at [phone] (TTY:[phone]) 8am - 5pm, Monday - Friday.  I ll reach out to you again in 6 months to follow up via telephone.  Warm regards,    Fiorella Whitehead RN    Phone: [phone]    E-mail: [email or web address]    cc: member records                                                        American Indians can continue or begin to use [facility] [place] [facility] and [facility] ([facility] clinics. We will not require prior approval or impose any conditions for you to get services at these clinics. For elders age 65 years and older this includes Elderly Waiver (EW) services accessed through the [place]. If a doctor or other provider in a [facility] [place] [facility] or [facility] clinic refers you to a provider in our network, we will not require you to see your primary care provider prior to the referral.      [id number]

## 2024-07-11 ENCOUNTER — PATIENT OUTREACH (OUTPATIENT)
Dept: GERIATRIC MEDICINE | Facility: CLINIC | Age: 79
End: 2024-07-11
Payer: COMMERCIAL

## 2024-07-11 NOTE — PROGRESS NOTES
[facility] Care Coordination Contact    CHW spoke with member's daughter (Ariana العراقي) to remind member MA renewal paperwork because the eligibility review is due by (08/01/2024). Member's daughter mentioned that she had not received MA paperwork and she spoke to [facility] on Monday, July 8//24 and they confirmed to her that member MA is Auto Renew.     CELESTE Cox  [facility]  [phone]

## 2024-09-04 DIAGNOSIS — L23.9 DERMAL HYPERSENSITIVITY REACTION: ICD-10-CM

## 2024-09-04 DIAGNOSIS — I83.10 VARICOSE VEINS OF LOWER EXTREMITY WITH INFLAMMATION, UNSPECIFIED LATERALITY: ICD-10-CM

## 2024-09-04 DIAGNOSIS — E78.2 MIXED HYPERLIPIDEMIA: ICD-10-CM

## 2024-09-04 DIAGNOSIS — I10 ESSENTIAL HYPERTENSION: ICD-10-CM

## 2024-09-04 RX ORDER — ASPIRIN 81 MG
81 TABLET,CHEWABLE ORAL DAILY
Qty: 90 TABLET | Refills: 1 | Status: SHIPPED | OUTPATIENT
Start: 2024-09-04

## 2024-11-10 DIAGNOSIS — I10 ESSENTIAL HYPERTENSION: ICD-10-CM

## 2024-11-10 DIAGNOSIS — L23.9 DERMAL HYPERSENSITIVITY REACTION: ICD-10-CM

## 2024-11-11 RX ORDER — CETIRIZINE HYDROCHLORIDE 10 MG/1
10 TABLET ORAL DAILY
Qty: 90 TABLET | Refills: 3 | Status: SHIPPED | OUTPATIENT
Start: 2024-11-11

## 2024-11-11 RX ORDER — LOSARTAN POTASSIUM 100 MG/1
100 TABLET ORAL DAILY
Qty: 90 TABLET | Refills: 1 | Status: SHIPPED | OUTPATIENT
Start: 2024-11-11

## 2024-11-27 DIAGNOSIS — E78.2 MIXED HYPERLIPIDEMIA: ICD-10-CM

## 2024-11-27 RX ORDER — SIMVASTATIN 40 MG
40 TABLET ORAL AT BEDTIME
Qty: 90 TABLET | Refills: 0 | Status: SHIPPED | OUTPATIENT
Start: 2024-11-27

## 2024-12-04 SDOH — HEALTH STABILITY: PHYSICAL HEALTH: ON AVERAGE, HOW MANY DAYS PER WEEK DO YOU ENGAGE IN MODERATE TO STRENUOUS EXERCISE (LIKE A BRISK WALK)?: 6 DAYS

## 2024-12-04 SDOH — HEALTH STABILITY: PHYSICAL HEALTH: ON AVERAGE, HOW MANY MINUTES DO YOU ENGAGE IN EXERCISE AT THIS LEVEL?: 30 MIN

## 2024-12-04 ASSESSMENT — SOCIAL DETERMINANTS OF HEALTH (SDOH): HOW OFTEN DO YOU GET TOGETHER WITH FRIENDS OR RELATIVES?: NEVER

## 2024-12-09 ENCOUNTER — OFFICE VISIT (OUTPATIENT)
Dept: FAMILY MEDICINE | Facility: CLINIC | Age: 79
End: 2024-12-09
Payer: COMMERCIAL

## 2024-12-09 VITALS
HEART RATE: 72 BPM | OXYGEN SATURATION: 94 % | SYSTOLIC BLOOD PRESSURE: 136 MMHG | HEIGHT: 67 IN | WEIGHT: 169.6 LBS | DIASTOLIC BLOOD PRESSURE: 76 MMHG | TEMPERATURE: 98.7 F | RESPIRATION RATE: 18 BRPM | BODY MASS INDEX: 26.62 KG/M2

## 2024-12-09 DIAGNOSIS — I65.22 STENOSIS OF LEFT CAROTID ARTERY: ICD-10-CM

## 2024-12-09 DIAGNOSIS — E78.5 HYPERLIPIDEMIA WITH TARGET LDL LESS THAN 130: ICD-10-CM

## 2024-12-09 DIAGNOSIS — K05.10 GINGIVITIS: ICD-10-CM

## 2024-12-09 DIAGNOSIS — Z12.5 SCREENING FOR PROSTATE CANCER: ICD-10-CM

## 2024-12-09 DIAGNOSIS — Z00.01 ENCOUNTER FOR GENERAL ADULT MEDICAL EXAMINATION WITH ABNORMAL FINDINGS: Primary | ICD-10-CM

## 2024-12-09 LAB
ERYTHROCYTE [DISTWIDTH] IN BLOOD BY AUTOMATED COUNT: 13.6 % (ref 10–15)
HCT VFR BLD AUTO: 49.3 % (ref 40–53)
HGB BLD-MCNC: 16.5 G/DL (ref 13.3–17.7)
MCH RBC QN AUTO: 29.1 PG (ref 26.5–33)
MCHC RBC AUTO-ENTMCNC: 33.5 G/DL (ref 31.5–36.5)
MCV RBC AUTO: 87 FL (ref 78–100)
PLATELET # BLD AUTO: 200 10E3/UL (ref 150–450)
RBC # BLD AUTO: 5.67 10E6/UL (ref 4.4–5.9)
WBC # BLD AUTO: 6.4 10E3/UL (ref 4–11)

## 2024-12-09 PROCEDURE — 80053 COMPREHEN METABOLIC PANEL: CPT | Performed by: FAMILY MEDICINE

## 2024-12-09 PROCEDURE — 85027 COMPLETE CBC AUTOMATED: CPT | Performed by: FAMILY MEDICINE

## 2024-12-09 PROCEDURE — 99397 PER PM REEVAL EST PAT 65+ YR: CPT | Performed by: FAMILY MEDICINE

## 2024-12-09 PROCEDURE — 99214 OFFICE O/P EST MOD 30 MIN: CPT | Mod: 25 | Performed by: FAMILY MEDICINE

## 2024-12-09 PROCEDURE — G0103 PSA SCREENING: HCPCS | Performed by: FAMILY MEDICINE

## 2024-12-09 PROCEDURE — 80061 LIPID PANEL: CPT | Performed by: FAMILY MEDICINE

## 2024-12-09 PROCEDURE — 36415 COLL VENOUS BLD VENIPUNCTURE: CPT | Performed by: FAMILY MEDICINE

## 2024-12-09 RX ORDER — CEPHALEXIN 500 MG/1
500 CAPSULE ORAL 3 TIMES DAILY
Qty: 21 CAPSULE | Refills: 0 | Status: SHIPPED | OUTPATIENT
Start: 2024-12-09

## 2024-12-09 RX ORDER — SODIUM PHOSPHATE,MONO-DIBASIC 19G-7G/118
1 ENEMA (ML) RECTAL DAILY
COMMUNITY

## 2024-12-09 ASSESSMENT — PAIN SCALES - GENERAL: PAINLEVEL_OUTOF10: NO PAIN (0)

## 2024-12-09 NOTE — PATIENT INSTRUCTIONS
Patient Education   Preventive Care Advice   This is general advice given by our system to help you stay healthy. However, your care team may have specific advice just for you. Please talk to your care team about your preventive care needs.  Nutrition  Eat 5 or more servings of fruits and vegetables each day.  Try wheat bread, brown rice and whole grain pasta (instead of white bread, rice, and pasta).  Get enough calcium and vitamin D. Check the label on foods and aim for 100% of the RDA (recommended daily allowance).  Lifestyle  Exercise at least 150 minutes each week  (30 minutes a day, 5 days a week).  Do muscle strengthening activities 2 days a week. These help control your weight and prevent disease.  No smoking.  Wear sunscreen to prevent skin cancer.  Have a dental exam and cleaning every 6 months.  Yearly exams  See your health care team every year to talk about:  Any changes in your health.  Any medicines your care team has prescribed.  Preventive care, family planning, and ways to prevent chronic diseases.  Shots (vaccines)   HPV shots (up to age 26), if you've never had them before.  Hepatitis B shots (up to age 59), if you've never had them before.  COVID-19 shot: Get this shot when it's due.  Flu shot: Get a flu shot every year.  Tetanus shot: Get a tetanus shot every 10 years.  Pneumococcal, hepatitis A, and RSV shots: Ask your care team if you need these based on your risk.  Shingles shot (for age 50 and up)  General health tests  Diabetes screening:  Starting at age 35, Get screened for diabetes at least every 3 years.  If you are younger than age 35, ask your care team if you should be screened for diabetes.  Cholesterol test: At age 39, start having a cholesterol test every 5 years, or more often if advised.  Bone density scan (DEXA): At age 50, ask your care team if you should have this scan for osteoporosis (brittle bones).  Hepatitis C: Get tested at least once in your life.  STIs (sexually  transmitted infections)  Before age 24: Ask your care team if you should be screened for STIs.  After age 24: Get screened for STIs if you're at risk. You are at risk for STIs (including HIV) if:  You are sexually active with more than one person.  You don't use condoms every time.  You or a partner was diagnosed with a sexually transmitted infection.  If you are at risk for HIV, ask about PrEP medicine to prevent HIV.  Get tested for HIV at least once in your life, whether you are at risk for HIV or not.  Cancer screening tests  Cervical cancer screening: If you have a cervix, begin getting regular cervical cancer screening tests starting at age 21.  Breast cancer scan (mammogram): If you've ever had breasts, begin having regular mammograms starting at age 40. This is a scan to check for breast cancer.  Colon cancer screening: It is important to start screening for colon cancer at age 45.  Have a colonoscopy test every 10 years (or more often if you're at risk) Or, ask your provider about stool tests like a FIT test every year or Cologuard test every 3 years.  To learn more about your testing options, visit:   .  For help making a decision, visit:   https://bit.ly/lx35251.  Prostate cancer screening test: If you have a prostate, ask your care team if a prostate cancer screening test (PSA) at age 55 is right for you.  Lung cancer screening: If you are a current or former smoker ages 50 to 80, ask your care team if ongoing lung cancer screenings are right for you.  For informational purposes only. Not to replace the advice of your health care provider. Copyright   2023 [facility]. All rights reserved. Clinically reviewed by the [facility] Transitions Program. [employer] 348877 - REV 01/24.  Learning About Activities of Daily Living  What are activities of daily living?     Activities of daily living (ADLs) are the basic self-care tasks you do every day. These include eating, bathing, dressing,  and moving around.  As you age, and if you have health problems, you may find that it's harder to do some of these tasks. If so, your doctor can suggest ideas that may help.  To measure what kind of help you may need, your doctor will ask how well you are able to do ADLs. Let your doctor know if there are any tasks that you are having trouble doing. This is an important first step to getting help. And when you have the help you need, you can stay as independent as possible.  How will a doctor assess your ADLs?  Asking about ADLs is part of a routine health checkup your doctor will likely do as you age. Your health check might be done in a doctor's office, in your home, or at a hospital. The goal is to find out if you are having any problems that could make it hard to care for yourself or that make it unsafe for you to be on your own.  To measure your ADLs, your doctor will ask how hard it is for you to do routine tasks. Your doctor may also want to know if you have changed the way you do a task because of a health problem. Your doctor may watch how you:  Walk back and forth.  Keep your balance while you stand or walk.  Move from sitting to standing or from a bed to a chair.  Button or unbutton a shirt or sweater.  Remove and put on your shoes.  It's common to feel a little worried or anxious if you find you can't do all the things you used to be able to do. Talking with your doctor about ADLs is a way to make sure you're as safe as possible and able to care for yourself as well as you can. You may want to bring a caregiver, friend, or family member to your checkup. They can help you talk to your doctor.  Follow-up care is a key part of your treatment and safety. Be sure to make and go to all appointments, and call your doctor if you are having problems. It's also a good idea to know your test results and keep a list of the medicines you take.  Current as of: October 24, 2023  Content Version: 14.2 2024 [facility]  [employer].   Care instructions adapted under license by your healthcare professional. If you have questions about a medical condition or this instruction, always ask your healthcare professional. Healthwise[employer] Incorporated disclaims any warranty or liability for your use of this information.    Preventing Falls: Care Instructions  Injuries and health problems such as trouble walking or poor eyesight can increase your risk of falling. So can some medicines. But there are things you can do to help prevent falls. You can exercise to get stronger. You can also arrange your home to make it safer.    Talk to your doctor about the medicines you take. Ask if any of them increase the risk of falls and whether they can be changed or stopped.   Try to exercise regularly. It can help improve your strength and balance. This can help lower your risk of falling.         Practice fall safety and prevention.   Wear low-heeled shoes that fit well and give your feet good support. Talk to your doctor if you have foot problems that make this hard.  Carry a cellphone or wear a medical alert device that you can use to call for help.  Use stepladders instead of chairs to reach high objects. Don't climb if you're at risk for falls. Ask for help, if needed.  Wear the correct eyeglasses, if you need them.        Make your home safer.   Remove rugs, cords, clutter, and furniture from walkways.  Keep your house well lit. Use night-lights in hallways and bathrooms.  Install and use sturdy handrails on stairways.  Wear nonskid footwear, even inside. Don't walk barefoot or in socks without shoes.        Be safe outside.   Use handrails, curb cuts, and ramps whenever possible.  Keep your hands free by using a shoulder bag or backpack.  Try to walk in well-lit areas. Watch out for uneven ground, changes in pavement, and debris.  Be careful in the winter. Walk on the grass or gravel when sidewalks are slippery. Use de-icer on steps and walkways.  "Add non-slip devices to shoes.    Put grab bars and nonskid mats in your shower or tub and near the toilet. Try to use a shower chair or bath bench when bathing.   Get into a tub or shower by putting in your weaker leg first. Get out with your strong side first. Have a phone or medical alert device in the bathroom with you.   Where can you learn more?  Go to https://www.[employer].net/patiented  Enter G117 in the search box to learn more about \"Preventing Falls: Care Instructions.\"  Current as of: July 17, 2023  Content Version: 14.2 2024 [employer].   Care instructions adapted under license by your healthcare professional. If you have questions about a medical condition or this instruction, always ask your healthcare professional. Healthwise[employer] Incorporated disclaims any warranty or liability for your use of this information.    Hearing Loss: Care Instructions  Overview     Hearing loss is a sudden or slow decrease in how well you hear. It can range from slight to profound. Permanent hearing loss can occur with aging. It also can happen when you are exposed long-term to loud noise. Examples include listening to loud music, riding motorcycles, or being around other loud machines.  Hearing loss can affect your work and home life. It can make you feel lonely or depressed. You may feel that you have lost your independence. But hearing aids and other devices can help you hear better and feel connected to others.  Follow-up care is a key part of your treatment and safety. Be sure to make and go to all appointments, and call your doctor if you are having problems. It's also a good idea to know your test results and keep a list of the medicines you take.  How can you care for yourself at home?  Avoid loud noises whenever possible. This helps keep your hearing from getting worse.  Always wear hearing protection around loud noises.  Wear a hearing aid as directed.  A professional can help you pick a hearing aid " "that will work best for you.  You can also get hearing aids over the counter for mild to moderate hearing loss.  Have hearing tests as your doctor suggests. They can show whether your hearing has changed. Your hearing aid may need to be adjusted.  Use other devices as needed. These may include:  Telephone amplifiers and hearing aids that can connect to a television, stereo, radio, or microphone.  Devices that use lights or vibrations. These alert you to the doorbell, a ringing telephone, or a baby monitor.  Television closed-captioning. This shows the words at the bottom of the screen. Most new TVs can do this.  TTY (text telephone). This lets you type messages back and forth on the telephone instead of talking or listening. These devices are also called TDD. When messages are typed on the keyboard, they are sent over the phone line to a receiving TTY. The message is shown on a monitor.  Use text messaging, social media, and email if it is hard for you to communicate by telephone.  Try to learn a listening technique called speechreading. It is not lipreading. You pay attention to people's gestures, expressions, posture, and tone of voice. These clues can help you understand what a person is saying. Face the person you are talking to, and have them face you. Make sure the lighting is good. You need to see the other person's face clearly.  Think about counseling if you need help to adjust to your hearing loss.  When should you call for help?  Watch closely for changes in your health, and be sure to contact your doctor if:    You think your hearing is getting worse.     You have new symptoms, such as dizziness or nausea.   Where can you learn more?  Go to https://www.[employer]ijib[employer].com.net/patiented  Enter R798 in the search box to learn more about \"Hearing Loss: Care Instructions.\"  Current as of: September 27, 2023  Content Version: 14.2 2024 [employer].   Care instructions adapted under license by your " healthcare professional. If you have questions about a medical condition or this instruction, always ask your healthcare professional. Healthwise, Incorporated disclaims any warranty or liability for your use of this information.

## 2024-12-09 NOTE — PROGRESS NOTES
"Preventive Care Visit  [facility] SUNI Turcios MD, Family Medicine  Dec 9, 2024      Assessment & Plan     Hyperlipidemia with target LDL less than 130  Stable   - Lipid panel reflex to direct LDL Non-fasting; Future    Encounter for general adult medical examination with abnormal findings    - Lipid panel reflex to direct LDL Non-fasting; Future  - CBC with platelets; Future  - Comprehensive metabolic panel (BMP + Alb, Alk Phos, ALT, AST, Total. Bili, TP); Future  - PSA, screen; Future    Screening for prostate cancer    - PSA, screen; Future    Stenosis of left carotid artery  Had past h/o left carotid stenosis, has no clinical sx, will have him to recheck US for further evaluation   - US Carotid Bilateral; Future    Gingivitis  Has been worsening with swelling and sore throat, will have him to start abx   - cephALEXin (KEFLEX) 500 MG capsule; Take 1 capsule (500 mg) by mouth 3 times daily.    Patient has been advised of split billing requirements and indicates understanding: Yes        BMI  Estimated body mass index is 26.53 kg/m  as calculated from the following:    Height as of this encounter: 1.703 m (5' 7.05\").    Weight as of this encounter: 76.9 kg (169 lb 9.6 oz).   Weight management plan: Discussed healthy diet and exercise guidelines    Counseling  Appropriate preventive services were addressed with this patient via screening, questionnaire, or discussion as appropriate for fall prevention, nutrition, physical activity, Tobacco-use cessation, social engagement, weight loss and cognition.  Checklist reviewing preventive services available has been given to the patient.  Reviewed patient's diet, addressing concerns and/or questions.   Patient is at risk for social isolation and has been provided with information about the benefit of social connection.   Updated plan of care.  Patient reported difficulty with activities of daily living were addressed today.The patient was " provided with written information regarding signs of hearing loss.       FUTURE APPOINTMENTS:       - Follow-up visit in 6 months     Ophelia Veloz is a 79 year old, presenting for the following:  Physical (Non fasting. )        12/9/2024     4:12 PM   Additional Questions   Roomed by Helene GARCIA   Accompanied by Daughter- Emiele           DOMINIK      Health Care Directive  Patient does not have a Health Care Directive: Discussed advance care planning with patient; however, patient declined at this time.      12/4/2024   General Health   How would you rate your overall physical health? Good   Feel stress (tense, anxious, or unable to sleep) Not at all            12/4/2024   Nutrition   Diet: Regular (no restrictions)            12/4/2024   Exercise   Days per week of moderate/strenous exercise 6 days   Average minutes spent exercising at this level 30 min            12/4/2024   Social Factors   Frequency of gathering with friends or relatives Never   Worry food won't last until get money to buy more No   Food not last or not have enough money for food? No   Do you have housing? (Housing is defined as stable permanent housing and does not include staying ouside in a car, in a tent, in an abandoned building, in an overnight shelter, or couch-surfing.) Yes   Are you worried about losing your housing? No   Lack of transportation? No   Unable to get utilities (heat,electricity)? No      (!) SOCIAL CONNECTIONS CONCERN      12/9/2024   Fall Risk   Gait Speed Test (Document in seconds) 4   Gait Speed Test Interpretation Less than or equal to 5.00 seconds - PASS             12/4/2024   Activities of Daily Living- Home Safety   Needs help with the following daily activites Telephone use    Transportation    Shopping    Preparing meals    Housework    Laundry    Medication administration    Dressing   Safety concerns in the home None of the above       Multiple values from one day are sorted in reverse-chronological order          12/4/2024   Dental   Dentist two times every year? Yes            12/4/2024   Hearing Screening   Hearing concerns? (!) I FEEL THAT PEOPLE ARE MUMBLING OR NOT SPEAKING CLEARLY.            12/4/2024   Driving Risk Screening   Patient/family members have concerns about driving No            12/4/2024   General Alertness/Fatigue Screening   Have you been more tired than usual lately? No            12/4/2024   Urinary Incontinence Screening   Bothered by leaking urine in past 6 months No            12/4/2024   TB Screening   Were you born outside of the [place]? Yes                  12/4/2024   Substance Use   Alcohol more than 3/day or more than 7/wk No   Do you have a current opioid prescription? No   How severe/bad is pain from 1 to 10? 0/10 (No Pain)   Do you use any other substances recreationally? No        Social History     Tobacco Use    Smoking status: Former     Current packs/day: 0.30     Average packs/day: 0.3 packs/day for 20.0 years (6.0 ttl pk-yrs)     Types: Cigarettes    Smokeless tobacco: Never   Vaping Use    Vaping status: Never Used   Substance Use Topics    Alcohol use: No    Drug use: No                 Reviewed and updated as needed this visit by Provider                    Past Medical History:   Diagnosis Date    Gastric ulcer, unspecified as acute or chronic, without mention of hemorrhage or perforation     Gastro-oesophageal reflux disease     High cholesterol     Hyperlipaemia     Hypertension     Smoking     Upper GI bleeding     Vertigo, benign positional      No past surgical history on file.  Labs reviewed in EPIC  BP Readings from Last 3 Encounters:   12/09/24 136/76   06/10/24 138/80   12/07/23 136/82    Wt Readings from Last 3 Encounters:   12/09/24 76.9 kg (169 lb 9.6 oz)   06/10/24 74.8 kg (165 lb)   12/07/23 74.3 kg (163 lb 12.8 oz)                  Patient Active Problem List   Diagnosis    Smoking    Dyspepsia    Asymptomatic varicose veins of both lower extremities     Hyperlipidemia LDL goal <130    H. pylori infection    Vertigo, benign positional    Essential hypertension    Hyperlipidemia with target LDL less than 130    Left knee pain    Benign neoplasm of colon    Pain of finger of right hand    Trigger finger, acquired     No past surgical history on file.    Social History     Tobacco Use    Smoking status: Former     Current packs/day: 0.30     Average packs/day: 0.3 packs/day for 20.0 years (6.0 ttl pk-yrs)     Types: Cigarettes    Smokeless tobacco: Never   Substance Use Topics    Alcohol use: No     Family History   Problem Relation Age of Onset    Hypertension Father     Cerebrovascular Disease Father     Hypertension Brother     Hyperlipidemia Maternal Grandmother          Current Outpatient Medications   Medication Sig Dispense Refill    aspirin (ASPIRIN LOW DOSE) 81 MG chewable tablet TAKE 1 TABLET BY MOUTH EVERY DAY 90 tablet 1    calcium carbonate 500 MG tablet Take 1 tablet by mouth daily Client taking it twice a day 100 tablet 3    cephALEXin (KEFLEX) 500 MG capsule Take 1 capsule (500 mg) by mouth 3 times daily. 21 capsule 0    cetirizine (ZYRTEC) 10 MG tablet TAKE 1 TABLET (10 MG) BY MOUTH DAILY. 90 tablet 3    chlorhexidine (PERIDEX) 0.12 % solution       fish oil-omega-3 fatty acids 1000 MG capsule Take 2 capsules by mouth daily. 180 capsule 12    glucosamine-chondroitin 500-400 MG CAPS per capsule Take 1 capsule by mouth daily.      losartan (COZAAR) 100 MG tablet TAKE 1 TABLET BY MOUTH EVERY DAY 90 tablet 1    Multiple Vitamins-Minerals (ONE-A-DAY MENS 50+ ADVANTAGE PO)       Multiple Vitamins-Minerals (PRESERVISION AREDS 2) CAPS       simvastatin (ZOCOR) 40 MG tablet TAKE 1 TABLET BY MOUTH AT BEDTIME 90 tablet 0    triamcinolone (KENALOG) 0.1 % external ointment Apply topically 2 times daily For active rash or itching on the legs.  Do not use if no redness or itching present 80 g 1    UNABLE TO FIND MEDICATION NAME: PreserVision AREDS 2 formula soft gel.  1 soft gel twice daily      meclizine (ANTIVERT) 25 MG tablet Take 1-2 tablets (25-50 mg) by mouth 3 times daily as needed for dizziness (vertigo) (Patient not taking: Reported on 12/9/2024) 30 tablet 1    mometasone (ELOCON) 0.1 % external cream APPLY TO AFFECTED AREA TOPICALLY EVERY DAY (Patient not taking: Reported on 12/9/2024) 45 g 1     Allergies   Allergen Reactions    Penicillins     Sulfa Antibiotics Hives    Sulphasomidine     Tetracycline      Recent Labs   Lab Test 06/10/24  1547 12/07/23  0958 11/23/23  1218 09/19/23  1457 12/01/22  1329 04/13/22  1306 10/12/21  1120 04/09/21  1058 10/01/20  1153   0000   LDL 87 50  --  69 79   < > 104* 128* 57   < >   HDL  --  35*  --   --  39*  --  37*  --  41  --    TRIG  --  273*  --   --  177*  --  219*  --  205*  --    ALT 20 20  --   --  28  --  75* 59 41   < >   CR 0.84 0.90 0.75 0.96 0.96   < > 0.85 0.88 0.97  --    GFRESTIMATED 88 87 >90 80 81   < > 84 83 75  --    GFRESTBLACK  --   --   --   --   --   --   --  >90 87  --    POTASSIUM 4.2 4.2 3.9 4.1 4.4   < > 3.6 4.0 4.4  --    TSH  --   --  1.52  --   --   --   --   --   --   --     < > = values in this interval not displayed.      Current providers sharing in care for this patient include:  Patient Care Team:  David Turcios MD as PCP - General (Family Practice)  David Turcios MD as Assigned PCP  Alicia Whitehead RN as Lead Care Coordinator  Jyotsna Ding APRN CNP as Nurse Practitioner (Dermatology)  Jyotsna Ding APRN CNP as Assigned Surgical Provider  Memo Grier MD as Assigned Heart and Vascular Provider    The following health maintenance items are reviewed in Epic and correct as of today:  Health Maintenance   Topic Date Due    RSV VACCINE (1 - 1-dose 75+ series) Never done    COVID-19 Vaccine (6 - 2024-25 season) 09/01/2024    ANNUAL REVIEW OF HM ORDERS  09/19/2024    LIPID  12/07/2024    MEDICARE ANNUAL WELLNESS VISIT  12/07/2024    BMP  06/10/2025    FALL RISK ASSESSMENT   "12/09/2025    GLUCOSE  06/10/2027    COLORECTAL CANCER SCREENING  10/10/2028    ADVANCE CARE PLANNING  12/07/2028    DTAP/TDAP/TD IMMUNIZATION (2 - Td or Tdap) 03/21/2029    PHQ-2 (once per calendar year)  Completed    INFLUENZA VACCINE  Completed    Pneumococcal Vaccine: 65+ Years  Completed    ZOSTER IMMUNIZATION  Completed    HPV IMMUNIZATION  Aged Out    MENINGITIS IMMUNIZATION  Aged Out    RSV MONOCLONAL ANTIBODY  Aged Out         Review of Systems  Constitutional, HEENT, cardiovascular, pulmonary, GI, , musculoskeletal, neuro, skin, endocrine and psych systems are negative, except as otherwise noted.     Objective    Exam  /76   Pulse 72   Temp 98.7  F (37.1  C) (Temporal)   Resp 18   Ht 1.703 m (5' 7.05\")   Wt 76.9 kg (169 lb 9.6 oz)   SpO2 94%   BMI 26.53 kg/m     Estimated body mass index is 26.53 kg/m  as calculated from the following:    Height as of this encounter: 1.703 m (5' 7.05\").    Weight as of this encounter: 76.9 kg (169 lb 9.6 oz).    Physical Exam  GENERAL: alert and no distress  EYES: Eyes grossly normal to inspection, PERRL and conjunctivae and sclerae normal  HENT: ear canals and TM's normal, nose and mouth without ulcers or lesions  NECK: no adenopathy, no asymmetry, masses, or scars  RESP: lungs clear to auscultation - no rales, rhonchi or wheezes  CV: regular rate and rhythm, normal S1 S2, no S3 or S4, no murmur, click or rub, no peripheral edema  ABDOMEN: soft, nontender, no hepatosplenomegaly, no masses and bowel sounds normal  MS: no gross musculoskeletal defects noted, no edema  SKIN: no suspicious lesions or rashes  NEURO: Normal strength and tone, mentation intact and speech normal  BACK: no CVA tenderness, no paralumbar tenderness         12/9/2024   Mini Cog   Clock Draw Score 2 Normal   3 Item Recall 2 objects recalled   Mini Cog Total Score 4                 Signed Electronically by: David Turcios MD    "

## 2024-12-10 ENCOUNTER — MYC MEDICAL ADVICE (OUTPATIENT)
Dept: FAMILY MEDICINE | Facility: CLINIC | Age: 79
End: 2024-12-10
Payer: COMMERCIAL

## 2024-12-10 DIAGNOSIS — K59.01 SLOW TRANSIT CONSTIPATION: ICD-10-CM

## 2024-12-10 LAB
ALBUMIN SERPL BCG-MCNC: 4.4 G/DL (ref 3.5–5.2)
ALP SERPL-CCNC: 75 U/L (ref 40–150)
ALT SERPL W P-5'-P-CCNC: 24 U/L (ref 0–70)
ANION GAP SERPL CALCULATED.3IONS-SCNC: 12 MMOL/L (ref 7–15)
AST SERPL W P-5'-P-CCNC: 29 U/L (ref 0–45)
BILIRUB SERPL-MCNC: 0.4 MG/DL
BUN SERPL-MCNC: 16.1 MG/DL (ref 8–23)
CALCIUM SERPL-MCNC: 9.6 MG/DL (ref 8.8–10.4)
CHLORIDE SERPL-SCNC: 101 MMOL/L (ref 98–107)
CHOLEST SERPL-MCNC: 136 MG/DL
CREAT SERPL-MCNC: 0.92 MG/DL (ref 0.67–1.17)
EGFRCR SERPLBLD CKD-EPI 2021: 84 ML/MIN/1.73M2
FASTING STATUS PATIENT QL REPORTED: ABNORMAL
FASTING STATUS PATIENT QL REPORTED: NORMAL
GLUCOSE SERPL-MCNC: 89 MG/DL (ref 70–99)
HCO3 SERPL-SCNC: 27 MMOL/L (ref 22–29)
HDLC SERPL-MCNC: 36 MG/DL
LDLC SERPL CALC-MCNC: 26 MG/DL
NONHDLC SERPL-MCNC: 100 MG/DL
POTASSIUM SERPL-SCNC: 4.2 MMOL/L (ref 3.4–5.3)
PROT SERPL-MCNC: 7.6 G/DL (ref 6.4–8.3)
PSA SERPL DL<=0.01 NG/ML-MCNC: 3.4 NG/ML
SODIUM SERPL-SCNC: 140 MMOL/L (ref 135–145)
TRIGL SERPL-MCNC: 369 MG/DL

## 2024-12-10 RX ORDER — POLYETHYLENE GLYCOL 3350 17 G/17G
17 POWDER, FOR SOLUTION ORAL DAILY
Qty: 500 G | Refills: 3 | Status: SHIPPED | OUTPATIENT
Start: 2024-12-10

## 2024-12-18 ENCOUNTER — HOSPITAL ENCOUNTER (OUTPATIENT)
Dept: ULTRASOUND IMAGING | Facility: CLINIC | Age: 79
Discharge: HOME | End: 2024-12-18
Attending: FAMILY MEDICINE
Payer: COMMERCIAL

## 2024-12-18 DIAGNOSIS — I65.22 STENOSIS OF LEFT CAROTID ARTERY: ICD-10-CM

## 2024-12-18 PROCEDURE — 93880 EXTRACRANIAL BILAT STUDY: CPT

## 2024-12-30 ENCOUNTER — PATIENT OUTREACH (OUTPATIENT)
Dept: GERIATRIC MEDICINE | Facility: CLINIC | Age: 79
End: 2024-12-30
Payer: COMMERCIAL

## 2024-12-30 NOTE — PROGRESS NOTES
[facility] Care Coordination Contact      [facility] Six-Month Telephone Assessment    6 month telephone assessment completed on 12/30/2024.    Per member, spoke with daughter (Emilee) who says everything is going well Roselia had his annual wellness exam 12/09/2024 and she says everything checked out well. He was placed on antibiotics for sore throat and infection, but seems to be doing well now.   CC explained that Roselia will be assigned a new care coordinator at some point, as this one is being transitioned into a different position. In the meantime, the member and family can reach out if there are concerns and this CC will forward it on to the covering CC.    ER visits: No  Hospitalizations: No  TCU stays: No  Significant health status changes: None at this time.  Falls/Injuries: No  ADL/IADL changes: No  Changes in services: No    Caregiver Assessment follow up:  NA    Goals: See Support Plan for goal progress documentation.      Will see member in 6 months for an annual health risk assessment.   Encouraged member to call CC with any questions or concerns in the meantime.     Fiorella Whitehead RN  [facility]  [phone]

## 2025-01-30 ENCOUNTER — VIRTUAL VISIT (OUTPATIENT)
Dept: FAMILY MEDICINE | Facility: CLINIC | Age: 80
End: 2025-01-30
Payer: COMMERCIAL

## 2025-01-30 ENCOUNTER — ANCILLARY PROCEDURE (OUTPATIENT)
Dept: GENERAL RADIOLOGY | Facility: CLINIC | Age: 80
End: 2025-01-30
Attending: FAMILY MEDICINE
Payer: COMMERCIAL

## 2025-01-30 DIAGNOSIS — R07.89 CHEST WALL PAIN: ICD-10-CM

## 2025-01-30 DIAGNOSIS — I87.2 STASIS DERMATITIS OF BOTH LEGS: ICD-10-CM

## 2025-01-30 DIAGNOSIS — L20.83 INFANTILE ECZEMA: Primary | ICD-10-CM

## 2025-01-30 DIAGNOSIS — I10 ESSENTIAL HYPERTENSION: ICD-10-CM

## 2025-01-30 DIAGNOSIS — E78.5 HYPERLIPIDEMIA WITH TARGET LDL LESS THAN 130: ICD-10-CM

## 2025-01-30 RX ORDER — TRIAMCINOLONE ACETONIDE 1 MG/G
OINTMENT TOPICAL 2 TIMES DAILY
Qty: 80 G | Refills: 4 | Status: SHIPPED | OUTPATIENT
Start: 2025-01-30

## 2025-01-30 NOTE — PROGRESS NOTES
Roselia is a 80 year old who is being evaluated via a billable video visit.    How would you like to obtain your AVS? MyChart  If the video visit is dropped, the invitation should be resent by: Text to cell phone: [phone]  Will anyone else be joining your video visit? No      Assessment & Plan     Stasis dermatitis of both legs  Worsening with dry cold weather, will have him to use topical triamcinolone   - triamcinolone (KENALOG) 0.1 % external ointment; Apply topically 2 times daily. For active rash or itching on the legs.  Do not use if no redness or itching present    Infantile eczema  Mentioned above   - triamcinolone (KENALOG) 0.1 % external ointment; Apply topically 2 times daily. For active rash or itching on the legs.  Do not use if no redness or itching present    Hyperlipidemia with target LDL less than 130  Has been stable   Keep monitoring     Essential hypertension  Stable     Chest wall pain  Worsening with fall, has right lower CP, will have him to check on xr  for further evaluation   - XR Ribs & Chest Right G/E 3 Views; Future      FUTURE APPOINTMENTS:       - Follow-up visit in 6 months for general routine exam     Ophelia Veloz is a 80 year old, presenting for the following health issues:  Derm Problem (Rash on upper body for two weeks, shoulder and back. Has been using moisturizers, no relief. ) and Fall (Fall injury last week, chest pain, no bleeding or bruising. )        1/30/2025     9:29 AM   Additional Questions   Roomed by Helene GARCIA     Video Start Time:  9:23    History of Present Illness       Reason for visit:  Eczema  Symptom onset:  1-2 weeks ago    He eats 2-3 servings of fruits and vegetables daily.He consumes 0 sweetened beverage(s) daily.He exercises with enough effort to increase his heart rate 30 to 60 minutes per day.  He exercises with enough effort to increase his heart rate 5 days per week.   He is taking medications regularly.       Rash  Onset/Duration: 2 weeks  ago  Description  Location: Upper body- shoulder and back  Character: blotchy  Itching: moderate  Intensity:  mild  Progression of Symptoms:  same  Accompanying signs and symptoms:   Fever: No  Body aches or joint pain: No  Sore throat symptoms: No  Recent cold symptoms: No  History:           Previous episodes of similar rash: Couple of years ago  New exposures:  None  Recent travel: No  Exposure to similar rash: No  Precipitating or alleviating factors: bedtime, itching is worse  Therapies tried and outcome: Mositurizing cream, no relief    Concern - Chest pain from fall injury  Onset: Since last week  Description: Walking out the house and tripped outside  Intensity: moderate  Progression of Symptoms:  worsening  Accompanying Signs & Symptoms: None  Previous history of similar problem: None  Precipitating factors:        Worsened by: touching  Alleviating factors:        Improved by: none  Therapies tried and outcome: None        Review of Systems  Constitutional, HEENT, cardiovascular, pulmonary, GI, , musculoskeletal, neuro, skin, endocrine and psych systems are negative, except as otherwise noted.      Objective    Vitals - Patient Reported  Pain Score: Mild Pain (3)  Pain Loc: Chest (fall injury)        Physical Exam   GENERAL: alert and no distress  EYES: Eyes grossly normal to inspection.  No discharge or erythema, or obvious scleral/conjunctival abnormalities.  RESP: No audible wheeze, cough, or visible cyanosis.    SKIN: Visible skin clear. No significant rash, abnormal pigmentation or lesions.  NEURO: Cranial nerves grossly intact.  Mentation and speech appropriate for age.  PSYCH: Appropriate affect, tone, and pace of words          Video-Visit Details    Type of service:  Video Visit   Video End Time:10:18 AM  Originating Location (pt. Location): Home    Distant Location (provider location):  On-site  Platform used for Video Visit: Mark  Signed Electronically by: David Turcios MD

## 2025-02-05 ENCOUNTER — ANCILLARY PROCEDURE (OUTPATIENT)
Dept: ULTRASOUND IMAGING | Facility: CLINIC | Age: 80
End: 2025-02-05
Attending: FAMILY MEDICINE
Payer: COMMERCIAL

## 2025-02-05 DIAGNOSIS — I70.0 THORACIC AORTA ATHEROSCLEROSIS: ICD-10-CM

## 2025-02-05 DIAGNOSIS — Z13.6 ENCOUNTER FOR ABDOMINAL AORTIC ANEURYSM (AAA) SCREENING: ICD-10-CM

## 2025-02-05 DIAGNOSIS — I10 ESSENTIAL HYPERTENSION: ICD-10-CM

## 2025-02-05 PROCEDURE — 76706 US ABDL AORTA SCREEN AAA: CPT

## 2025-02-26 ENCOUNTER — HOSPITAL ENCOUNTER (OUTPATIENT)
Dept: CARDIOLOGY | Facility: CLINIC | Age: 80
Discharge: HOME | End: 2025-02-26
Attending: FAMILY MEDICINE
Payer: COMMERCIAL

## 2025-02-26 DIAGNOSIS — I70.0 THORACIC AORTA ATHEROSCLEROSIS: ICD-10-CM

## 2025-02-26 DIAGNOSIS — I10 ESSENTIAL HYPERTENSION: ICD-10-CM

## 2025-02-26 LAB — LVEF ECHO: NORMAL

## 2025-02-26 PROCEDURE — 93306 TTE W/DOPPLER COMPLETE: CPT

## 2025-02-26 PROCEDURE — 93306 TTE W/DOPPLER COMPLETE: CPT | Mod: 26 | Performed by: INTERNAL MEDICINE

## 2025-03-01 DIAGNOSIS — L23.9 DERMAL HYPERSENSITIVITY REACTION: ICD-10-CM

## 2025-03-01 DIAGNOSIS — I83.10 VARICOSE VEINS OF LOWER EXTREMITY WITH INFLAMMATION, UNSPECIFIED LATERALITY: ICD-10-CM

## 2025-03-01 DIAGNOSIS — E78.2 MIXED HYPERLIPIDEMIA: ICD-10-CM

## 2025-03-01 DIAGNOSIS — I10 ESSENTIAL HYPERTENSION: ICD-10-CM

## 2025-03-03 RX ORDER — ASPIRIN 81 MG
81 TABLET,CHEWABLE ORAL DAILY
Qty: 90 TABLET | Refills: 1 | Status: SHIPPED | OUTPATIENT
Start: 2025-03-03

## 2025-04-30 ENCOUNTER — PATIENT OUTREACH (OUTPATIENT)
Dept: GERIATRIC MEDICINE | Facility: CLINIC | Age: 80
End: 2025-04-30
Payer: COMMERCIAL

## 2025-04-30 NOTE — PROGRESS NOTES
[facility] Care Coordination Contact    Internal CC change effective 5/1/2025.  Mailed member CC Change letter.  Additional tasks to be completed by CMS include: update database & EPIC, enter CC Change in MMIS, and move member file.    Meme Sweet  Case Management Specialist   [facility]  [phone]

## 2025-04-30 NOTE — LETTER
April 30, 2025    Important [employer] Information    MADY [place]ICHO[place]  [address]   [place] [address]    Your New Care Coordinator  Dear Mady,  My name is Quincy Ng RN, PHN  and I am your new Care Coordinator. You may reach me by calling [phone]. I will be in touch with you shortly to address any questions you may have.   I have also been in contact with Fiorella Whitehead RN, your previous care coordinator, to ensure a smooth transition.  Questions?  Call me at [phone] Monday-Friday between 8am and 5pm. TTY: [phone]. I look forward to working with you as a [employer]  member.  Sincerely,    Quincy Ng RN  [phone]  [email or web address]    cc: member records

## 2025-05-06 DIAGNOSIS — I10 ESSENTIAL HYPERTENSION: ICD-10-CM

## 2025-05-06 RX ORDER — LOSARTAN POTASSIUM 100 MG/1
100 TABLET ORAL DAILY
Qty: 90 TABLET | Refills: 1 | Status: SHIPPED | OUTPATIENT
Start: 2025-05-06

## 2025-05-13 ENCOUNTER — PATIENT OUTREACH (OUTPATIENT)
Dept: GERIATRIC MEDICINE | Facility: CLINIC | Age: 80
End: 2025-05-13
Payer: COMMERCIAL

## 2025-05-13 ASSESSMENT — PATIENT HEALTH QUESTIONNAIRE - PHQ9: SUM OF ALL RESPONSES TO PHQ QUESTIONS 1-9: 0

## 2025-05-13 ASSESSMENT — LIFESTYLE VARIABLES
SKIP TO QUESTIONS 9-10: 1
AUDIT-C TOTAL SCORE: 0

## 2025-05-13 NOTE — PROGRESS NOTES
[facility] Care Coordination Contact      [facility] Home Visit Assessment     Home visit for Health Risk Assessment with Roselia العراقي completed on May 13, 2025    Type of residence:: Apartment - handicap accessible  Current living arrangement:: I live in a private home with spouse. Member has an apartment in [place], but also spends time with daughter in her single family home in [place].    Current Care Plan  Member currently receiving the following home care services: Housekeeping, PCA.  Member currently receiving the following community resources:     Day care (Critical access hospital), transportation, lifeline ([facility]).    Medication Review  Medication reconciliation completed in Epic: Yes  Medication set-up & administration: Family/informal caregiver sets up weekly.  Self-administers medications.  Medication Risk Assessment Medication (1 or more, place referral to MTM): N/A: No risk factors identified  MTM Referral Placed: No: No risk factors idenified    Mental/Behavioral Health   Depression Screening:      PHQ-9 Total Score: 0    Mental health DX:: No   Mental health DX how managed:: None    Falls Assessment:   Fallen 2 or more times in the past year?: No   Any fall with injury in the past year?: No    ADL/IADL Dependencies:   ADL: Dressing, grooming, bathing, mobility  iADL: Light and heavy housekeeping, laundry, meal prep, paperwork, finances, shopping, medication management.       Health Plan sponsored benefits: [employer]: Shared information regarding [employer]. Reviewed preventative health screening and health plan supplemental benefits/incentives. Reviewed medication disposal form.    CFSS Assessment completed at visit: Yes Annual CFSS assessment indicated 3.5 hours per day of CFSS. This is the same as the previous assessment.     Elderly Waiver Eligibility: Yes-will continue on EW    Care Plan & Recommendations: Member splits time between a single family home with Daughter in [place]  [place] and his apartment with his wife in [place]. Member intends to remain in the community for the foreseeable future. Member utilizes remote adult day care, PCA, [facility], homemaking / cleaning, and transportation. Member is considering switching to in-person adult day care as well as adding meals through the [facility] center and will contact CC once member has made a final decision. CC spoke with member regarding ACP, however member refuses referrals and further information regarding ACP at this time.    See [facility] Assessment for detailed assessment information.    Follow-Up Plan: Member is considering switching to in-person adult day care and also is interested in delivery of meals from [facility] as well. CC will [place] back with member regarding these requests and any associated service changes. Member informed of future contact, plan to f/u with member with a 6 month telephone assessment.  Contact information shared with member and family, encouraged member to call with any questions or concerns at any time.    [place] care continuum providers: Please see Snapshot and Care Management Flowsheets for Specific details of care plan.    This CC note routed to PCP, David Turcios RN  [facility]  [phone]

## 2025-05-13 NOTE — Clinical Note
Kendall Turcios, I am the community care coordinator for [place]. Just informing you that he and I have completed his annual home visit for EW eligibility. His home and community services will remain the same, including PCA. Refer to home visit note for additional details if needed - thanks!

## 2025-05-15 ENCOUNTER — PATIENT OUTREACH (OUTPATIENT)
Dept: GERIATRIC MEDICINE | Facility: CLINIC | Age: 80
End: 2025-05-15
Payer: COMMERCIAL

## 2025-05-15 NOTE — PROGRESS NOTES
[facility] Care Coordination Contact    5/15/2025: CC rec'd call from [facility] regarding recent referral. [facility] received referral and were requesting health plan authorization to provide consultation services. CC responded that the service delivery plan is still pending as family is making some final decisions about their services and then the auth will be submitted to the health plan for authorization, likely next week. CC will follow-up to notify [facility] once authorization is submitted.    Quincy Ng RN  [facility]  [phone]

## 2025-05-15 NOTE — PROGRESS NOTES
[facility] Care Coordination Contact    5/14/25: Spoke with daughter Emilee regarding home delivered meals request. Presented three options:  1) Authorize home delivered meals with [facility] and [facility] family member homemaking to 3hr/week from 5hr/week, excluding meal delivery on any days in which member were to visit an ADC.  2) Start in-person ADC a number of times per week and receive meals during ADC in-person sessions.   3) Change nothing, continue services as is with remote ADC and no delivered meals.    Emilee states that she will discuss options with her parents and call back once the family has reached a decision.    Quincy Ng RN  [facility]  [phone]

## 2025-05-19 ENCOUNTER — PATIENT OUTREACH (OUTPATIENT)
Dept: GERIATRIC MEDICINE | Facility: CLINIC | Age: 80
End: 2025-05-19
Payer: COMMERCIAL

## 2025-05-19 NOTE — LETTER
May 19, 2025    MADY NICHOLE  [address]   [place] [address]      Kendall Veloz,    I hope you ve been well since we last spoke. Attached is your copy of your personalized Support Plan which summarizes our conversation.   My goal is to help you keep your health on track. Your Support Plan includes the steps we agreed would help you with that. We can talk about these steps during our next meeting or sooner if you d like.   Here are additional programs and services I can help you with:    Get to appointments with [facility]    If you need a ride to medical or dental visits, [facility] can help. Call to schedule a ride. [phone] ([phone]), 8 a.m. - 6 p.m. CT, Monday - Friday.    Access One[employer] to boost your health    Get a gym membership, at-home fitness classes, and free access to fun activities that help your brain. To get access, go to [employer] or call One-[employer].   [phone] (TTY:[phone]), 8 a.m. - 9 p.m. CT, Monday - Friday      Set up your health care directive  A directive is a legal form that explains your health care wishes. You can request this form from me, and I ll walk you through it before you discuss your plans with your doctor.    Schedule your annual physical  I can help set up your annual physical at your clinic of choice. It's an important step towards good health.      Have questions? I m here to help.  Call me at [phone] (TTY:[phone]) 8am - 5pm, Monday - Friday.  I ll reach out to you again in 6 months to follow up via telephone.  Warm regards,    Quincy Ng RN  [phone]  [email or web address]    cc: member records                                                                    [id number]

## 2025-05-19 NOTE — TELEPHONE ENCOUNTER
[facility] Care Coordination Contact    Received after visit chart from care coordinator.  Completed following tasks: Mailed copy of support plan to member, Mailed [place] Cho[place] signature sheet pages 3-4, Mailed Safe Medication Disposal , Mailed [employer] Leave Behind Letter, Submitted referrals/auths for homemaking and ADC (remote and in person), Uploaded consent to communicate form(s) to Epic, and Updated services in Database. [employer] does not require auth referrals for monthly PERS fees, meals, or ADC transportation.   and Provider Signature - No Support Plan Shared:  Member indicates that they do not want their support plan shared with any EW providers.    Chelsea Bejarano  Care Management Specialist  [facility]  [phone]

## 2025-06-05 ENCOUNTER — PATIENT OUTREACH (OUTPATIENT)
Dept: GERIATRIC MEDICINE | Facility: CLINIC | Age: 80
End: 2025-06-05
Payer: COMMERCIAL

## 2025-06-05 NOTE — PROGRESS NOTES
[facility] Care Coordination Contact    6/5/2025: CC received call from [facility] regarding receiving the authorization for remote ADC. However, [facility] states they have not received an auth for the remote meals. [facility] states that they use a different NPI for meals ([id number]). CC revised support plan with new NPI and re-tasked CMS to auth.    6/5/2025: CC received response from CMS that meals are not authed and the provider will be able to simply bill [employer] or speak with [employer] Provider relations for help. CC contacted [facility] and left voicemail detailing this information and left callback number should they have questions.    Quincy Ng RN  [facility]  [phone]

## 2025-07-09 ENCOUNTER — TRANSFERRED RECORDS (OUTPATIENT)
Dept: HEALTH INFORMATION MANAGEMENT | Facility: CLINIC | Age: 80
End: 2025-07-09

## 2025-08-26 DIAGNOSIS — E78.2 MIXED HYPERLIPIDEMIA: ICD-10-CM

## 2025-08-26 DIAGNOSIS — I10 ESSENTIAL HYPERTENSION: ICD-10-CM

## 2025-08-26 DIAGNOSIS — L23.9 DERMAL HYPERSENSITIVITY REACTION: ICD-10-CM

## 2025-08-26 DIAGNOSIS — I83.10 VARICOSE VEINS OF LOWER EXTREMITY WITH INFLAMMATION, UNSPECIFIED LATERALITY: ICD-10-CM

## 2025-08-26 RX ORDER — ASPIRIN 81 MG/1
81 TABLET, CHEWABLE ORAL DAILY
Qty: 90 TABLET | Refills: 0 | Status: SHIPPED | OUTPATIENT
Start: 2025-08-26

## (undated) RX ORDER — REGADENOSON 0.08 MG/ML
INJECTION, SOLUTION INTRAVENOUS
Status: DISPENSED
Start: 2023-11-20